# Patient Record
Sex: FEMALE | Race: WHITE | NOT HISPANIC OR LATINO | URBAN - METROPOLITAN AREA
[De-identification: names, ages, dates, MRNs, and addresses within clinical notes are randomized per-mention and may not be internally consistent; named-entity substitution may affect disease eponyms.]

---

## 2018-11-09 ENCOUNTER — EMERGENCY (EMERGENCY)
Facility: HOSPITAL | Age: 83
LOS: 0 days | Discharge: HOME | End: 2018-11-09
Attending: EMERGENCY MEDICINE
Payer: MEDICARE

## 2018-11-09 VITALS
SYSTOLIC BLOOD PRESSURE: 182 MMHG | HEIGHT: 69 IN | WEIGHT: 100.09 LBS | TEMPERATURE: 98 F | DIASTOLIC BLOOD PRESSURE: 111 MMHG | HEART RATE: 100 BPM | RESPIRATION RATE: 18 BRPM | OXYGEN SATURATION: 100 %

## 2018-11-09 VITALS
HEART RATE: 88 BPM | RESPIRATION RATE: 18 BRPM | TEMPERATURE: 98 F | OXYGEN SATURATION: 99 % | SYSTOLIC BLOOD PRESSURE: 132 MMHG | DIASTOLIC BLOOD PRESSURE: 81 MMHG

## 2018-11-09 DIAGNOSIS — Z88.9 ALLERGY STATUS TO UNSPECIFIED DRUGS, MEDICAMENTS AND BIOLOGICAL SUBSTANCES: ICD-10-CM

## 2018-11-09 DIAGNOSIS — E78.5 HYPERLIPIDEMIA, UNSPECIFIED: ICD-10-CM

## 2018-11-09 DIAGNOSIS — E11.9 TYPE 2 DIABETES MELLITUS WITHOUT COMPLICATIONS: ICD-10-CM

## 2018-11-09 DIAGNOSIS — K25.9 GASTRIC ULCER, UNSPECIFIED AS ACUTE OR CHRONIC, WITHOUT HEMORRHAGE OR PERFORATION: ICD-10-CM

## 2018-11-09 DIAGNOSIS — E03.9 HYPOTHYROIDISM, UNSPECIFIED: ICD-10-CM

## 2018-11-09 DIAGNOSIS — I10 ESSENTIAL (PRIMARY) HYPERTENSION: ICD-10-CM

## 2018-11-09 DIAGNOSIS — R68.83 CHILLS (WITHOUT FEVER): ICD-10-CM

## 2018-11-09 DIAGNOSIS — Z90.710 ACQUIRED ABSENCE OF BOTH CERVIX AND UTERUS: ICD-10-CM

## 2018-11-09 DIAGNOSIS — R26.81 UNSTEADINESS ON FEET: ICD-10-CM

## 2018-11-09 DIAGNOSIS — R53.1 WEAKNESS: ICD-10-CM

## 2018-11-09 DIAGNOSIS — R05 COUGH: ICD-10-CM

## 2018-11-09 LAB
ALBUMIN SERPL ELPH-MCNC: 3.5 G/DL — SIGNIFICANT CHANGE UP (ref 3.3–5)
ALP SERPL-CCNC: 92 U/L — SIGNIFICANT CHANGE UP (ref 40–120)
ALT FLD-CCNC: 20 U/L — SIGNIFICANT CHANGE UP (ref 12–78)
ANION GAP SERPL CALC-SCNC: 10 MMOL/L — SIGNIFICANT CHANGE UP (ref 5–17)
APPEARANCE UR: CLEAR — SIGNIFICANT CHANGE UP
APTT BLD: 32.7 SEC — SIGNIFICANT CHANGE UP (ref 27.5–36.3)
AST SERPL-CCNC: 24 U/L — SIGNIFICANT CHANGE UP (ref 15–37)
BACTERIA # UR AUTO: ABNORMAL
BASOPHILS # BLD AUTO: 0.06 K/UL — SIGNIFICANT CHANGE UP (ref 0–0.2)
BASOPHILS NFR BLD AUTO: 0.6 % — SIGNIFICANT CHANGE UP (ref 0–2)
BILIRUB SERPL-MCNC: 0.5 MG/DL — SIGNIFICANT CHANGE UP (ref 0.2–1.2)
BILIRUB UR-MCNC: NEGATIVE — SIGNIFICANT CHANGE UP
BUN SERPL-MCNC: 10 MG/DL — SIGNIFICANT CHANGE UP (ref 7–23)
CALCIUM SERPL-MCNC: 8.6 MG/DL — SIGNIFICANT CHANGE UP (ref 8.5–10.1)
CHLORIDE SERPL-SCNC: 104 MMOL/L — SIGNIFICANT CHANGE UP (ref 96–108)
CK MB BLD-MCNC: 1.6 % — SIGNIFICANT CHANGE UP (ref 0–3.5)
CK MB CFR SERPL CALC: 4.4 NG/ML — HIGH (ref 0.5–3.6)
CK SERPL-CCNC: 267 U/L — HIGH (ref 26–192)
CO2 SERPL-SCNC: 26 MMOL/L — SIGNIFICANT CHANGE UP (ref 22–31)
COLOR SPEC: YELLOW — SIGNIFICANT CHANGE UP
CREAT SERPL-MCNC: 0.94 MG/DL — SIGNIFICANT CHANGE UP (ref 0.5–1.3)
DIFF PNL FLD: ABNORMAL
EOSINOPHIL # BLD AUTO: 0.03 K/UL — SIGNIFICANT CHANGE UP (ref 0–0.5)
EOSINOPHIL NFR BLD AUTO: 0.3 % — SIGNIFICANT CHANGE UP (ref 0–6)
EPI CELLS # UR: SIGNIFICANT CHANGE UP
GLUCOSE SERPL-MCNC: 109 MG/DL — HIGH (ref 70–99)
GLUCOSE UR QL: NEGATIVE MG/DL — SIGNIFICANT CHANGE UP
HCT VFR BLD CALC: 42.2 % — SIGNIFICANT CHANGE UP (ref 34.5–45)
HGB BLD-MCNC: 13.4 G/DL — SIGNIFICANT CHANGE UP (ref 11.5–15.5)
IMM GRANULOCYTES NFR BLD AUTO: 1.1 % — SIGNIFICANT CHANGE UP (ref 0–1.5)
INR BLD: 1.02 RATIO — SIGNIFICANT CHANGE UP (ref 0.88–1.16)
KETONES UR-MCNC: ABNORMAL
LACTATE SERPL-SCNC: 1.2 MMOL/L — SIGNIFICANT CHANGE UP (ref 0.7–2)
LEUKOCYTE ESTERASE UR-ACNC: NEGATIVE — SIGNIFICANT CHANGE UP
LIDOCAIN IGE QN: 87 U/L — SIGNIFICANT CHANGE UP (ref 73–393)
LYMPHOCYTES # BLD AUTO: 0.92 K/UL — LOW (ref 1–3.3)
LYMPHOCYTES # BLD AUTO: 9 % — LOW (ref 13–44)
MCHC RBC-ENTMCNC: 30.5 PG — SIGNIFICANT CHANGE UP (ref 27–34)
MCHC RBC-ENTMCNC: 31.8 GM/DL — LOW (ref 32–36)
MCV RBC AUTO: 95.9 FL — SIGNIFICANT CHANGE UP (ref 80–100)
MONOCYTES # BLD AUTO: 0.55 K/UL — SIGNIFICANT CHANGE UP (ref 0–0.9)
MONOCYTES NFR BLD AUTO: 5.4 % — SIGNIFICANT CHANGE UP (ref 2–14)
NEUTROPHILS # BLD AUTO: 8.52 K/UL — HIGH (ref 1.8–7.4)
NEUTROPHILS NFR BLD AUTO: 83.6 % — HIGH (ref 43–77)
NITRITE UR-MCNC: NEGATIVE — SIGNIFICANT CHANGE UP
NRBC # BLD: 0 /100 WBCS — SIGNIFICANT CHANGE UP (ref 0–0)
PH UR: 7 — SIGNIFICANT CHANGE UP (ref 5–8)
PLATELET # BLD AUTO: 201 K/UL — SIGNIFICANT CHANGE UP (ref 150–400)
POTASSIUM SERPL-MCNC: 3.4 MMOL/L — LOW (ref 3.5–5.3)
POTASSIUM SERPL-SCNC: 3.4 MMOL/L — LOW (ref 3.5–5.3)
PROT SERPL-MCNC: 6.8 GM/DL — SIGNIFICANT CHANGE UP (ref 6–8.3)
PROT UR-MCNC: 15 MG/DL
PROTHROM AB SERPL-ACNC: 11.4 SEC — SIGNIFICANT CHANGE UP (ref 10–12.9)
RBC # BLD: 4.4 M/UL — SIGNIFICANT CHANGE UP (ref 3.8–5.2)
RBC # FLD: 14.2 % — SIGNIFICANT CHANGE UP (ref 10.3–14.5)
RBC CASTS # UR COMP ASSIST: ABNORMAL /HPF (ref 0–4)
SODIUM SERPL-SCNC: 140 MMOL/L — SIGNIFICANT CHANGE UP (ref 135–145)
SP GR SPEC: 1 — LOW (ref 1.01–1.02)
TROPONIN I SERPL-MCNC: <.015 NG/ML — SIGNIFICANT CHANGE UP (ref 0.01–0.04)
UROBILINOGEN FLD QL: NEGATIVE MG/DL — SIGNIFICANT CHANGE UP
WBC # BLD: 10.19 K/UL — SIGNIFICANT CHANGE UP (ref 3.8–10.5)
WBC # FLD AUTO: 10.19 K/UL — SIGNIFICANT CHANGE UP (ref 3.8–10.5)

## 2018-11-09 PROCEDURE — 99053 MED SERV 10PM-8AM 24 HR FAC: CPT

## 2018-11-09 PROCEDURE — 93010 ELECTROCARDIOGRAM REPORT: CPT

## 2018-11-09 PROCEDURE — 70450 CT HEAD/BRAIN W/O DYE: CPT | Mod: 26

## 2018-11-09 PROCEDURE — 71045 X-RAY EXAM CHEST 1 VIEW: CPT | Mod: 26

## 2018-11-09 PROCEDURE — 73110 X-RAY EXAM OF WRIST: CPT | Mod: 26,LT

## 2018-11-09 PROCEDURE — 72170 X-RAY EXAM OF PELVIS: CPT | Mod: 26

## 2018-11-09 PROCEDURE — 99284 EMERGENCY DEPT VISIT MOD MDM: CPT | Mod: 25

## 2018-11-09 PROCEDURE — ZZZZZ: CPT

## 2018-11-09 PROCEDURE — 72125 CT NECK SPINE W/O DYE: CPT | Mod: 26

## 2018-11-09 RX ORDER — SODIUM CHLORIDE 9 MG/ML
500 INJECTION INTRAMUSCULAR; INTRAVENOUS; SUBCUTANEOUS ONCE
Qty: 0 | Refills: 0 | Status: COMPLETED | OUTPATIENT
Start: 2018-11-09 | End: 2018-11-09

## 2018-11-09 RX ORDER — TETANUS TOXOID, REDUCED DIPHTHERIA TOXOID AND ACELLULAR PERTUSSIS VACCINE, ADSORBED 5; 2.5; 8; 8; 2.5 [IU]/.5ML; [IU]/.5ML; UG/.5ML; UG/.5ML; UG/.5ML
0.5 SUSPENSION INTRAMUSCULAR ONCE
Qty: 0 | Refills: 0 | Status: COMPLETED | OUTPATIENT
Start: 2018-11-09 | End: 2018-11-09

## 2018-11-09 RX ORDER — AMLODIPINE BESYLATE 2.5 MG/1
5 TABLET ORAL ONCE
Qty: 0 | Refills: 0 | Status: DISCONTINUED | OUTPATIENT
Start: 2018-11-09 | End: 2018-11-09

## 2018-11-09 RX ORDER — SODIUM CHLORIDE 9 MG/ML
3 INJECTION INTRAMUSCULAR; INTRAVENOUS; SUBCUTANEOUS ONCE
Qty: 0 | Refills: 0 | Status: COMPLETED | OUTPATIENT
Start: 2018-11-09 | End: 2018-11-09

## 2018-11-09 RX ORDER — LISINOPRIL 2.5 MG/1
10 TABLET ORAL ONCE
Qty: 0 | Refills: 0 | Status: DISCONTINUED | OUTPATIENT
Start: 2018-11-09 | End: 2018-11-09

## 2018-11-09 RX ADMIN — Medication 0.1 MILLIGRAM(S): at 11:29

## 2018-11-09 RX ADMIN — SODIUM CHLORIDE 500 MILLILITER(S): 9 INJECTION INTRAMUSCULAR; INTRAVENOUS; SUBCUTANEOUS at 09:40

## 2018-11-09 RX ADMIN — SODIUM CHLORIDE 500 MILLILITER(S): 9 INJECTION INTRAMUSCULAR; INTRAVENOUS; SUBCUTANEOUS at 08:29

## 2018-11-09 RX ADMIN — TETANUS TOXOID, REDUCED DIPHTHERIA TOXOID AND ACELLULAR PERTUSSIS VACCINE, ADSORBED 0.5 MILLILITER(S): 5; 2.5; 8; 8; 2.5 SUSPENSION INTRAMUSCULAR at 08:30

## 2018-11-09 RX ADMIN — SODIUM CHLORIDE 3 MILLILITER(S): 9 INJECTION INTRAMUSCULAR; INTRAVENOUS; SUBCUTANEOUS at 07:30

## 2018-11-09 NOTE — PHYSICAL THERAPY INITIAL EVALUATION ADULT - GENERAL OBSERVATIONS, REHAB EVAL
Pt seen supine in stretcher, NAD, alert, Aide at bedside. Pt performed bed mobility and sit to stand independently. Pt required use of rolling walker versus straight cane secondary to balance deficits. Pt ambulates 50ft c supervision and rolling walker, negotiated steps c supervision and straight cane. Pt resumed supine position in stretcher, left in NAD, call bell in reach.

## 2018-11-09 NOTE — ED ADULT NURSE NOTE - OBJECTIVE STATEMENT
patient's private aide states she lost her balance @ appr 5am when going to the bathroom. fell. denies head trauma. denies pain.

## 2018-11-09 NOTE — ED PROVIDER NOTE - MEDICAL DECISION MAKING DETAILS
initial plan of care: labs, iv, ct, xr, urinalysis initial plan of care: labs, iv, ct, xr, urinalysis; plan is for home hospice; patient seen and evaluated by PT and Juanita from social work

## 2018-11-09 NOTE — ED ADULT NURSE NOTE - INTERVENTIONS DEFINITIONS
Call bell, personal items and telephone within reach/Room bathroom lighting operational/Non-slip footwear when patient is off stretcher/Stretcher in lowest position, wheels locked, appropriate side rails in place/Instruct patient to call for assistance/[place] to call system/Physically safe environment: no spills, clutter or unnecessary equipment/Provide visual cue, wrist band, yellow gown, etc./Provide visual clues: red socks/Monitor gait and stability/Monitor for mental status changes and reorient to person, place, and time/Review medications for side effects contributing to fall risk

## 2018-11-09 NOTE — ED PROVIDER NOTE - OBJECTIVE STATEMENT
89yoF; with pmh signif for DM, HTN, HLD, Hypothyroidism; now p/w generalized weakness. states for past 2 days it has been increasingly difficult to get out of bed. she has been trying to roll out of bed and fell onto ground twice.  states she is too weak to sit up on her own.  c/o chills. c/o cough. denies fever. denies sick contacts. denies cp/sob/palp. denies abd pain. c/o left wrist pain. denies dysuria, hematuria, frequency, urgency  PMH: DM, HTN, HLD, Hypothyroidism  SOCIAL: No tobacco/illicit substance use/EtOH

## 2018-11-09 NOTE — ED PROVIDER NOTE - PHYSICAL EXAMINATION
General:     NAD  Head:     NC/AT, EOMI, oral mucosa moist  Neck:     trachea midline  Lungs:     CTA b/l, no w/r/r  CVS:     S1S2, RRR, no m/g/r  Abd:     +BS, s/nt/nd, no organomegaly  SPINE: nt midline c/t/l/s spine  Ext:    2+ radial and pedal pulses, no c/c/e. from/nt @ bilateral hip, knee, ankle. from/nt @ right shoulder, elbow, wris.t from/nt @ left shoulder, elbow. ttp @ left wrist, from.   Skin: abrasion and ecchymoses to bilateral arms.   Neuro: AAOx3, no sensory/motor deficits

## 2018-11-09 NOTE — PHYSICAL THERAPY INITIAL EVALUATION ADULT - ADDITIONAL COMMENTS
Pt has ~16 steps c bilateral railings (too far apart to hold onto both). Pt has [facility] 5 days a week for 4 hours a day to assist with ADL's.

## 2018-11-09 NOTE — PHYSICAL THERAPY INITIAL EVALUATION ADULT - GAIT DEVIATIONS NOTED, PT EVAL
decreased stride length/increased time in double stance/decreased step length/decreased marisa/decreased velocity of limb motion

## 2018-11-09 NOTE — ED PROVIDER NOTE - PMH
DM (diabetes mellitus) type II controlled peripheral vascular disorder    Gastric ulcer    H/O abdominal hysterectomy    History of cholecystectomy    Hyperlipidemia    Hypertension    Hypothyroid

## 2018-11-09 NOTE — PHYSICAL THERAPY INITIAL EVALUATION ADULT - CRITERIA FOR SKILLED THERAPEUTIC INTERVENTIONS
anticipated equipment needs at discharge/impairments found/functional limitations in following categories/risk reduction/prevention/therapy frequency/home with home PT and rolling walker./anticipated discharge recommendation/predicted duration of therapy intervention

## 2018-11-09 NOTE — ED PROVIDER NOTE - NS ED ROS FT
Constitutional: (-) fever  (+)chills  (-)sweats  Eyes/ENT: (-) blurry vision, (-) epistaxis  (-)rhinorrhea   (-) sore throat    Cardiovascular: (-) chest pain, (-) palpitations (-) edema   Respiratory: (+) cough, (-) shortness of breath   Gastrointestinal: (-)nausea  (-)vomiting, (-) diarrhea  (-) abdominal pain   :  (-)dysuria, (-)frequency, (-)urgency, (-)hematuria  Musculoskeletal: (-) neck pain, (-) back pain, (-) joint pain  Integumentary: (-) rash, (-) edema  Neurological: (-) headache, (-) altered mental status  (-)LOC

## 2018-11-09 NOTE — ED ADULT TRIAGE NOTE - WEIGHT METHOD
Nurse's notes (such as: Visit Notes) reviewed and accepted.  Social History     Social History   • Marital status:      Spouse name: N/A   • Number of children: 1   • Years of education: N/A     Occupational History   • retired      police department     Social History Main Topics   • Smoking status: Former Smoker     Packs/day: 1.00     Years: 20.00     Quit date: 1/1/1986   • Smokeless tobacco: Never Used   • Alcohol use No      Comment: none   • Drug use: No   • Sexual activity: Yes     Partners: Female     Other Topics Concern   • Blood Transfusions No   • Caffeine Concern No   • Sleep Concern No   • Stress Concern No   • Weight Concern Yes   • Special Diet No   • Exercise Yes     walk 2-3 miles daily   • Seat Belt Yes     Social History Narrative    .. Retired. Advised in hospital to complete living will and POAHC.  for [employer]     Family History   Problem Relation Age of Onset   • Stroke Mother    • Hypertension Mother    • Heart disease Father    • Cancer Maternal Grandfather      pancreatic     ALLERGIES:  No Known Allergies  Current Outpatient Prescriptions   Medication Sig Dispense Refill   • lisinopril-hydroCHLOROthiazide (PRINZIDE,ZESTORETIC) 20-25 MG per tablet Take 1 tablet by mouth daily. Needs appt for more refills 90 tablet 0   • atenolol (TENORMIN) 25 MG tablet Take 1 tablet by mouth daily. 90 tablet 0   • atorvastatin (LIPITOR) 40 MG tablet Take 1 tablet by mouth daily. 90 tablet 0   • sildenafil (VIAGRA) 50 MG tablet Take 1 tablet by mouth as needed for Erectile Dysfunction. 5 tablet 6   • ketoconazole (NIZORAL) 2 % shampoo APPLY TO THE AFFECTED AREA TWO TIMES PER WEEK THEN RINSE OFF IN 5 MINUTES 120 mL 11   • cephALEXin (KEFLEX) 500 MG capsule Take 4 capsules one hour prior to dental procedures 16 capsule 2   • FREESTYLE LITE strip Test 1 time per day before or 2 hours after a meal 50 strip 11   • Blood Glucose Monitoring Suppl (FREESTYLE FREEDOM LITE) W/DEVICE  KIT Test 1 time per day before or 2 hours after a meal 1 kit 0     No current facility-administered medications for this visit.      Past Medical History:   Diagnosis Date   • BCC (basal cell carcinoma), face 5/2016    Dr Hahn Right cheek   • Benign neoplasm of colon 8/14/15    hyperplastic x 1   • Chronic kidney disease (CKD)     creat 1.6 BUN 29 7/06;  1.5 and 22 6/05 nl 6/14   • Hyperpotassemia 9/06    stopped lisinopril 40 mg   • Labyrinthitis, unspecified     remotely ? vestibular migrinae   • Olecranon bursitis 2014    septic   • Osteoarthritis knee    • Other abnormal blood chemistry     IGT glucose 116 at ce 6/06   • Other and unspecified hyperlipidemia     combined    • Other and unspecified malignant neoplasm of skin of other and unspecified parts of face     nose   • Personal history of colonic polyps 2008 2011 follow up [place]   • Pneumonia, organism unspecified(486) 9/06    smo   • Squamous cell carcinoma    • Status post total left knee replacement using cement 2-11-16    Dr Moss   • Unspecified asthma, with exacerbation 9/06    new onset with pneumonia 9/06   • Unspecified essential hypertension    • Unspecified malignant neoplasm of skin, site unspecified     bcc        stated

## 2018-11-10 LAB
CULTURE RESULTS: NO GROWTH — SIGNIFICANT CHANGE UP
SPECIMEN SOURCE: SIGNIFICANT CHANGE UP

## 2018-11-11 ENCOUNTER — INPATIENT (INPATIENT)
Facility: HOSPITAL | Age: 83
LOS: 2 days | Discharge: HOME | End: 2018-11-14
Attending: INTERNAL MEDICINE | Admitting: INTERNAL MEDICINE
Payer: MEDICARE

## 2018-11-11 VITALS
HEIGHT: 61 IN | HEART RATE: 85 BPM | DIASTOLIC BLOOD PRESSURE: 80 MMHG | SYSTOLIC BLOOD PRESSURE: 159 MMHG | OXYGEN SATURATION: 98 % | RESPIRATION RATE: 19 BRPM | WEIGHT: 115.96 LBS

## 2018-11-11 DIAGNOSIS — Z98.890 OTHER SPECIFIED POSTPROCEDURAL STATES: Chronic | ICD-10-CM

## 2018-11-11 DIAGNOSIS — E11.9 TYPE 2 DIABETES MELLITUS WITHOUT COMPLICATIONS: ICD-10-CM

## 2018-11-11 DIAGNOSIS — R07.89 OTHER CHEST PAIN: ICD-10-CM

## 2018-11-11 DIAGNOSIS — R26.81 UNSTEADINESS ON FEET: ICD-10-CM

## 2018-11-11 DIAGNOSIS — I48.0 PAROXYSMAL ATRIAL FIBRILLATION: ICD-10-CM

## 2018-11-11 DIAGNOSIS — E03.9 HYPOTHYROIDISM, UNSPECIFIED: ICD-10-CM

## 2018-11-11 DIAGNOSIS — I10 ESSENTIAL (PRIMARY) HYPERTENSION: ICD-10-CM

## 2018-11-11 LAB
ALBUMIN SERPL ELPH-MCNC: 3.1 G/DL — LOW (ref 3.3–5)
ALP SERPL-CCNC: 84 U/L — SIGNIFICANT CHANGE UP (ref 40–120)
ALT FLD-CCNC: 18 U/L — SIGNIFICANT CHANGE UP (ref 12–78)
ANION GAP SERPL CALC-SCNC: 11 MMOL/L — SIGNIFICANT CHANGE UP (ref 5–17)
APPEARANCE UR: CLEAR — SIGNIFICANT CHANGE UP
AST SERPL-CCNC: 12 U/L — LOW (ref 15–37)
BACTERIA # UR AUTO: ABNORMAL
BASOPHILS # BLD AUTO: 0.04 K/UL — SIGNIFICANT CHANGE UP (ref 0–0.2)
BASOPHILS NFR BLD AUTO: 0.4 % — SIGNIFICANT CHANGE UP (ref 0–2)
BILIRUB SERPL-MCNC: 0.5 MG/DL — SIGNIFICANT CHANGE UP (ref 0.2–1.2)
BILIRUB UR-MCNC: NEGATIVE — SIGNIFICANT CHANGE UP
BUN SERPL-MCNC: 16 MG/DL — SIGNIFICANT CHANGE UP (ref 7–23)
CALCIUM SERPL-MCNC: 8.2 MG/DL — LOW (ref 8.5–10.1)
CHLORIDE SERPL-SCNC: 100 MMOL/L — SIGNIFICANT CHANGE UP (ref 96–108)
CK MB CFR SERPL CALC: 2.3 NG/ML — SIGNIFICANT CHANGE UP (ref 0.5–3.6)
CK SERPL-CCNC: 89 U/L — SIGNIFICANT CHANGE UP (ref 26–192)
CK SERPL-CCNC: 98 U/L — SIGNIFICANT CHANGE UP (ref 26–192)
CO2 SERPL-SCNC: 25 MMOL/L — SIGNIFICANT CHANGE UP (ref 22–31)
COLOR SPEC: YELLOW — SIGNIFICANT CHANGE UP
COMMENT - URINE: SIGNIFICANT CHANGE UP
CREAT SERPL-MCNC: 1.22 MG/DL — SIGNIFICANT CHANGE UP (ref 0.5–1.3)
D DIMER BLD IA.RAPID-MCNC: 336 NG/ML DDU — HIGH
DIFF PNL FLD: ABNORMAL
EOSINOPHIL # BLD AUTO: 0.02 K/UL — SIGNIFICANT CHANGE UP (ref 0–0.5)
EOSINOPHIL NFR BLD AUTO: 0.2 % — SIGNIFICANT CHANGE UP (ref 0–6)
EPI CELLS # UR: SIGNIFICANT CHANGE UP
GLUCOSE BLDC GLUCOMTR-MCNC: 156 MG/DL — HIGH (ref 70–99)
GLUCOSE BLDC GLUCOMTR-MCNC: 250 MG/DL — HIGH (ref 70–99)
GLUCOSE SERPL-MCNC: 349 MG/DL — HIGH (ref 70–99)
GLUCOSE UR QL: 1000 MG/DL
HCT VFR BLD CALC: 37 % — SIGNIFICANT CHANGE UP (ref 34.5–45)
HGB BLD-MCNC: 12.1 G/DL — SIGNIFICANT CHANGE UP (ref 11.5–15.5)
HYALINE CASTS # UR AUTO: ABNORMAL /LPF
IMM GRANULOCYTES NFR BLD AUTO: 0.4 % — SIGNIFICANT CHANGE UP (ref 0–1.5)
INR BLD: 1.11 RATIO — SIGNIFICANT CHANGE UP (ref 0.88–1.16)
KETONES UR-MCNC: NEGATIVE — SIGNIFICANT CHANGE UP
LEUKOCYTE ESTERASE UR-ACNC: ABNORMAL
LYMPHOCYTES # BLD AUTO: 1.14 K/UL — SIGNIFICANT CHANGE UP (ref 1–3.3)
LYMPHOCYTES # BLD AUTO: 10.2 % — LOW (ref 13–44)
MAGNESIUM SERPL-MCNC: 2.4 MG/DL — SIGNIFICANT CHANGE UP (ref 1.6–2.6)
MCHC RBC-ENTMCNC: 30.9 PG — SIGNIFICANT CHANGE UP (ref 27–34)
MCHC RBC-ENTMCNC: 32.7 GM/DL — SIGNIFICANT CHANGE UP (ref 32–36)
MCV RBC AUTO: 94.6 FL — SIGNIFICANT CHANGE UP (ref 80–100)
MONOCYTES # BLD AUTO: 0.81 K/UL — SIGNIFICANT CHANGE UP (ref 0–0.9)
MONOCYTES NFR BLD AUTO: 7.3 % — SIGNIFICANT CHANGE UP (ref 2–14)
NEUTROPHILS # BLD AUTO: 9.11 K/UL — HIGH (ref 1.8–7.4)
NEUTROPHILS NFR BLD AUTO: 81.5 % — HIGH (ref 43–77)
NITRITE UR-MCNC: NEGATIVE — SIGNIFICANT CHANGE UP
NRBC # BLD: 0 /100 WBCS — SIGNIFICANT CHANGE UP (ref 0–0)
PH UR: 5 — SIGNIFICANT CHANGE UP (ref 5–8)
PLATELET # BLD AUTO: 189 K/UL — SIGNIFICANT CHANGE UP (ref 150–400)
POTASSIUM SERPL-MCNC: 3.6 MMOL/L — SIGNIFICANT CHANGE UP (ref 3.5–5.3)
POTASSIUM SERPL-SCNC: 3.6 MMOL/L — SIGNIFICANT CHANGE UP (ref 3.5–5.3)
PROT SERPL-MCNC: 6.2 GM/DL — SIGNIFICANT CHANGE UP (ref 6–8.3)
PROT UR-MCNC: 30 MG/DL
PROTHROM AB SERPL-ACNC: 12.5 SEC — SIGNIFICANT CHANGE UP (ref 10–12.9)
RBC # BLD: 3.91 M/UL — SIGNIFICANT CHANGE UP (ref 3.8–5.2)
RBC # FLD: 14 % — SIGNIFICANT CHANGE UP (ref 10.3–14.5)
RBC CASTS # UR COMP ASSIST: ABNORMAL /HPF (ref 0–4)
SODIUM SERPL-SCNC: 136 MMOL/L — SIGNIFICANT CHANGE UP (ref 135–145)
SP GR SPEC: 1.01 — SIGNIFICANT CHANGE UP (ref 1.01–1.02)
TROPONIN I SERPL-MCNC: <.015 NG/ML — SIGNIFICANT CHANGE UP (ref 0.01–0.04)
TSH SERPL-MCNC: 2.31 UU/ML — SIGNIFICANT CHANGE UP (ref 0.36–3.74)
UROBILINOGEN FLD QL: 1 MG/DL
WBC # BLD: 11.16 K/UL — HIGH (ref 3.8–10.5)
WBC # FLD AUTO: 11.16 K/UL — HIGH (ref 3.8–10.5)
WBC UR QL: SIGNIFICANT CHANGE UP

## 2018-11-11 PROCEDURE — 71275 CT ANGIOGRAPHY CHEST: CPT | Mod: 26

## 2018-11-11 PROCEDURE — 99285 EMERGENCY DEPT VISIT HI MDM: CPT

## 2018-11-11 PROCEDURE — 93010 ELECTROCARDIOGRAM REPORT: CPT

## 2018-11-11 PROCEDURE — 71045 X-RAY EXAM CHEST 1 VIEW: CPT | Mod: 26

## 2018-11-11 PROCEDURE — 70450 CT HEAD/BRAIN W/O DYE: CPT | Mod: 26

## 2018-11-11 RX ORDER — LEVOTHYROXINE SODIUM 125 MCG
50 TABLET ORAL DAILY
Qty: 0 | Refills: 0 | Status: DISCONTINUED | OUTPATIENT
Start: 2018-11-11 | End: 2018-11-14

## 2018-11-11 RX ORDER — SODIUM CHLORIDE 9 MG/ML
1000 INJECTION INTRAMUSCULAR; INTRAVENOUS; SUBCUTANEOUS ONCE
Qty: 0 | Refills: 0 | Status: COMPLETED | OUTPATIENT
Start: 2018-11-11 | End: 2018-11-11

## 2018-11-11 RX ORDER — DEXTROSE 50 % IN WATER 50 %
15 SYRINGE (ML) INTRAVENOUS ONCE
Qty: 0 | Refills: 0 | Status: DISCONTINUED | OUTPATIENT
Start: 2018-11-11 | End: 2018-11-14

## 2018-11-11 RX ORDER — DEXTROSE 50 % IN WATER 50 %
25 SYRINGE (ML) INTRAVENOUS ONCE
Qty: 0 | Refills: 0 | Status: DISCONTINUED | OUTPATIENT
Start: 2018-11-11 | End: 2018-11-14

## 2018-11-11 RX ORDER — SIMVASTATIN 20 MG/1
10 TABLET, FILM COATED ORAL AT BEDTIME
Qty: 0 | Refills: 0 | Status: DISCONTINUED | OUTPATIENT
Start: 2018-11-11 | End: 2018-11-14

## 2018-11-11 RX ORDER — AMLODIPINE BESYLATE 2.5 MG/1
5 TABLET ORAL DAILY
Qty: 0 | Refills: 0 | Status: DISCONTINUED | OUTPATIENT
Start: 2018-11-11 | End: 2018-11-14

## 2018-11-11 RX ORDER — SODIUM CHLORIDE 9 MG/ML
1000 INJECTION, SOLUTION INTRAVENOUS
Qty: 0 | Refills: 0 | Status: DISCONTINUED | OUTPATIENT
Start: 2018-11-11 | End: 2018-11-14

## 2018-11-11 RX ORDER — LISINOPRIL 2.5 MG/1
10 TABLET ORAL DAILY
Qty: 0 | Refills: 0 | Status: DISCONTINUED | OUTPATIENT
Start: 2018-11-11 | End: 2018-11-14

## 2018-11-11 RX ORDER — PANTOPRAZOLE SODIUM 20 MG/1
40 TABLET, DELAYED RELEASE ORAL
Qty: 0 | Refills: 0 | Status: DISCONTINUED | OUTPATIENT
Start: 2018-11-11 | End: 2018-11-14

## 2018-11-11 RX ORDER — DEXTROSE 50 % IN WATER 50 %
12.5 SYRINGE (ML) INTRAVENOUS ONCE
Qty: 0 | Refills: 0 | Status: DISCONTINUED | OUTPATIENT
Start: 2018-11-11 | End: 2018-11-14

## 2018-11-11 RX ORDER — ENOXAPARIN SODIUM 100 MG/ML
50 INJECTION SUBCUTANEOUS ONCE
Qty: 0 | Refills: 0 | Status: COMPLETED | OUTPATIENT
Start: 2018-11-11 | End: 2018-11-11

## 2018-11-11 RX ORDER — METOPROLOL TARTRATE 50 MG
25 TABLET ORAL
Qty: 0 | Refills: 0 | Status: DISCONTINUED | OUTPATIENT
Start: 2018-11-11 | End: 2018-11-14

## 2018-11-11 RX ORDER — GLUCAGON INJECTION, SOLUTION 0.5 MG/.1ML
1 INJECTION, SOLUTION SUBCUTANEOUS ONCE
Qty: 0 | Refills: 0 | Status: DISCONTINUED | OUTPATIENT
Start: 2018-11-11 | End: 2018-11-14

## 2018-11-11 RX ORDER — ENOXAPARIN SODIUM 100 MG/ML
50 INJECTION SUBCUTANEOUS DAILY
Qty: 0 | Refills: 0 | Status: DISCONTINUED | OUTPATIENT
Start: 2018-11-12 | End: 2018-11-13

## 2018-11-11 RX ORDER — MORPHINE SULFATE 50 MG/1
2 CAPSULE, EXTENDED RELEASE ORAL ONCE
Qty: 0 | Refills: 0 | Status: DISCONTINUED | OUTPATIENT
Start: 2018-11-11 | End: 2018-11-11

## 2018-11-11 RX ORDER — ACETAMINOPHEN 500 MG
650 TABLET ORAL EVERY 6 HOURS
Qty: 0 | Refills: 0 | Status: DISCONTINUED | OUTPATIENT
Start: 2018-11-11 | End: 2018-11-14

## 2018-11-11 RX ORDER — INSULIN LISPRO 100/ML
VIAL (ML) SUBCUTANEOUS AT BEDTIME
Qty: 0 | Refills: 0 | Status: DISCONTINUED | OUTPATIENT
Start: 2018-11-11 | End: 2018-11-14

## 2018-11-11 RX ORDER — ACETAMINOPHEN 500 MG
650 TABLET ORAL ONCE
Qty: 0 | Refills: 0 | Status: COMPLETED | OUTPATIENT
Start: 2018-11-11 | End: 2018-11-11

## 2018-11-11 RX ADMIN — MORPHINE SULFATE 2 MILLIGRAM(S): 50 CAPSULE, EXTENDED RELEASE ORAL at 10:56

## 2018-11-11 RX ADMIN — ENOXAPARIN SODIUM 50 MILLIGRAM(S): 100 INJECTION SUBCUTANEOUS at 12:40

## 2018-11-11 RX ADMIN — Medication 25 MILLIGRAM(S): at 16:06

## 2018-11-11 RX ADMIN — SODIUM CHLORIDE 1000 MILLILITER(S): 9 INJECTION INTRAMUSCULAR; INTRAVENOUS; SUBCUTANEOUS at 10:22

## 2018-11-11 RX ADMIN — SIMVASTATIN 10 MILLIGRAM(S): 20 TABLET, FILM COATED ORAL at 21:29

## 2018-11-11 RX ADMIN — Medication 650 MILLIGRAM(S): at 10:10

## 2018-11-11 RX ADMIN — Medication 650 MILLIGRAM(S): at 16:24

## 2018-11-11 RX ADMIN — Medication 650 MILLIGRAM(S): at 10:41

## 2018-11-11 RX ADMIN — Medication 650 MILLIGRAM(S): at 16:06

## 2018-11-11 RX ADMIN — MORPHINE SULFATE 2 MILLIGRAM(S): 50 CAPSULE, EXTENDED RELEASE ORAL at 10:27

## 2018-11-11 NOTE — ED ADULT NURSE NOTE - NSIMPLEMENTINTERV_GEN_ALL_ED
Implemented All Fall with Harm Risk Interventions:  [place] to call system. Call bell, personal items and telephone within reach. Instruct patient to call for assistance. Room bathroom lighting operational. Non-slip footwear when patient is off stretcher. Physically safe environment: no spills, clutter or unnecessary equipment. Stretcher in lowest position, wheels locked, appropriate side rails in place. Provide visual cue, wrist band, yellow gown, etc. Monitor gait and stability. Monitor for mental status changes and reorient to person, place, and time. Review medications for side effects contributing to fall risk. Reinforce activity limits and safety measures with patient and family. Provide visual clues: red socks.

## 2018-11-11 NOTE — CONSULT NOTE ADULT - SUBJECTIVE AND OBJECTIVE BOX
HPI:  HPI:  PT  REPORTED  WITH  FALL 2  DAYS  AGO  EVALUATED  AND  DISCHARGED FROM  ER  PT  REPORTED WITH  CP  RE EVALUATED  IN  ER  FOUND  TO  BE  WITH AFIB   ADMITTED  FOR  FURTHER  W/U  AND  TREATMENT (2018 12:54)      Chief Complaint:  Patient is a 89y old  Female who presents with a chief complaint of chest pain  NEW  ONSET  AFIB (2018 08:12)      Review of Systems:    General:  No wt loss, fevers, chills, night sweats  Eyes:  Good vision, no reported pain  ENT:  No sore throat, pain, runny nose, dysphagia  CV:  chest pain, no palpitations, hypo/hypertension  Resp:  No dyspnea, cough, tachypnea, wheezing  GI:  No pain, nausea, vomiting, diarrhea, constipation  :  No pain, bleeding, incontinence, nocturia           Social History/Family History  SOCHX:   tobacco,  -  alcohol    FMHX: FA/MO  - contributory       Discussed with:  PMD, Family    Physical Exam:    Vital Signs:  Vital Signs Last 24 Hrs  T(C): 36.7 (2018 11:57), Max: 36.7 (2018 11:57)  T(F): 98 (2018 11:57), Max: 98 (2018 11:57)  HR: 88 (2018 11:57) (82 - 89)  BP: 113/66 (2018 11:57) (113/66 - 173/96)  BP(mean): --  RR: 16 (2018 11:57) (16 - 18)  SpO2: 99% (2018 11:57) (97% - 100%)  Daily Height in cm: 154.94 (2018 16:58)    Daily Weight in k.9 (2018 05:16)  I&O's Summary    2018 07:01  -  2018 15:40  --------------------------------------------------------  IN: 180 mL / OUT: 0 mL / NET: 180 mL          HEENT:  NC/AT, patent nares w/ pink mucosa, OP clear w/o lesions, PERRL, EOMI, conjunctivae clear, no thyromegaly, nodules, adenopathy, no JVD  Chest:  Full & symmetric excursion, no increased effort, breath sounds clear  Cardiovascular:  IRRegular rhythm, S1, S2, no murmur/rub/S3/S4, no carotid/femoral/abdominal bruit, radial/pedal pulses 2+, no edema  Abdomen:  Soft, non-tender, non-distended, normoactive bowel sounds, no HSM    Laboratory:                          11.7   7.55  )-----------( 187      ( 2018 07:03 )             35.5         139  |  104  |  9   ----------------------------<  98  3.5   |  28  |  0.82    Ca    8.0<L>      2018 07:03  Mg     2.4         TPro  5.7<L>  /  Alb  2.8<L>  /  TBili  0.6  /  DBili  x   /  AST  13<L>  /  ALT  16  /  AlkPhos  73        CARDIAC MARKERS ( 2018 07:03 )  <.015 ng/mL / x     / 77 U/L / x     / x      CARDIAC MARKERS ( 2018 21:27 )  <.015 ng/mL / x     / 89 U/L / x     / x      CARDIAC MARKERS ( 2018 14:22 )  <.015 ng/mL / x     / 98 U/L / x     / x      CARDIAC MARKERS ( 2018 10:21 )  <.015 ng/mL / x     / x     / x     / 2.3 ng/mL      CAPILLARY BLOOD GLUCOSE      POCT Blood Glucose.: 183 mg/dL (2018 11:35)  POCT Blood Glucose.: 107 mg/dL (2018 07:31)  POCT Blood Glucose.: 156 mg/dL (2018 21:28)    LIVER FUNCTIONS - ( 2018 07:03 )  Alb: 2.8 g/dL / Pro: 5.7 gm/dL / ALK PHOS: 73 U/L / ALT: 16 U/L / AST: 13 U/L / GGT: x           PT/INR - ( 2018 10:21 )   PT: 12.5 sec;   INR: 1.11 ratio           Urinalysis Basic - ( 2018 12:37 )    Color: Yellow / Appearance: Clear / S.015 / pH: x  Gluc: x / Ketone: Negative  / Bili: Negative / Urobili: 1 mg/dL   Blood: x / Protein: 30 mg/dL / Nitrite: Negative   Leuk Esterase: Trace / RBC: 3-5 /HPF / WBC 0-2   Sq Epi: x / Non Sq Epi: Few / Bacteria: Few        Assessment:    AFIB  Cause of CP likely RVR  Metoprolol effective  HTN, SBP managed  F/U TTE recent or new
No

## 2018-11-11 NOTE — ED ADULT TRIAGE NOTE - CHIEF COMPLAINT QUOTE
patient c/o of chest pain no radiation started today , c/o of headache and difficulty breathing ,denied N/V , patient had a fall 2 days ago which she was treated here for it

## 2018-11-11 NOTE — H&P ADULT - PROBLEM SELECTOR PLAN 1
monitor  on  telemetry  rate  control  anticoagualtion TTE  seraal  troponins  Cardiology eval monitor  on  telemetry  rate  control  anticoagualtion TTE  serial  troponins  Cardiology eval

## 2018-11-11 NOTE — H&P ADULT - NSHPLABSRESULTS_GEN_ALL_CORE
LABS:                        12.1   11.16 )-----------( 189      ( 11 Nov 2018 10:21 )             37.0     11-11    136  |  100  |  16  ----------------------------<  349<H>  3.6   |  25  |  1.22    Ca    8.2<L>      11 Nov 2018 10:21  Mg     2.4     11-11    TPro  6.2  /  Alb  3.1<L>  /  TBili  0.5  /  DBili  x   /  AST  12<L>  /  ALT  18  /  AlkPhos  84  11-11    PT/INR - ( 11 Nov 2018 10:21 )   PT: 12.5 sec;   INR: 1.11 ratio

## 2018-11-11 NOTE — ED ADULT NURSE NOTE - OBJECTIVE STATEMENT
per daughter at bedside pt was here 2 days ago after she fell off the bed  today woke up c/o chest pain

## 2018-11-11 NOTE — H&P ADULT - HISTORY OF PRESENT ILLNESS
PT  REPORTED  WITH  FALL 2  DAYS  AGO  EVALUATED  AND  DISCHARGED FROM  ER  PT  REPORTED WITH  CP  RE EVALUATED  IN  ER  FOUND  TO  BE  WITH AFIB   ADMITTED  FOR  FURTHER  W/U  AND  TREATMENT

## 2018-11-11 NOTE — H&P ADULT - NSHPPHYSICALEXAM_GEN_ALL_CORE
PHYSICAL EXAM:    GENERAL: NAD, well-groomed, well-developed  HEAD:  Atraumatic, Normocephalic  EYES: EOMI, PERRLA, conjunctiva and sclera clear  ENMT: No tonsillar erythema, exudates, or enlargement; Moist mucous membranes, , No lesions  NECK: Supple, No JVD, Normal thyroid  NERVOUS SYSTEM:  Alert & Oriented X2, ; Motor Strength 5/5 B/L upper and lower extremities; DTRs 2+ intact and symmetric  CHEST/LUNG: Clear  bilaterally; No rales, rhonchi, wheezing, or rubs + CW  tenderness  HEART: IRRegular rate and rhythm; No murmurs, rubs, or gallops  ABDOMEN: Soft, Nontender, Nondistended; no  masses Bowel sounds present  EXTREMITIES:  + Peripheral Pulses, No clubbing, cyanosis, or edema  LYMPH: No lymphadenopathy noted   RECTAL: deferred    SKIN: No rashes or lesions

## 2018-11-11 NOTE — ED PROVIDER NOTE - MEDICAL DECISION MAKING DETAILS
patient pw cp and sob, she has new onset afib. will need to rule out acs, electrolyte abnl. will start on bloodthinners

## 2018-11-11 NOTE — ED PROVIDER NOTE - OBJECTIVE STATEMENT
Pertinent PMH/PSH/FHx/SHx and Review of Systems contained within:  89F hx htn, dm, depression, hypothyroid pw cp and sob. patient had fallen off bed 2 days ago sustained right hip and chest pain. she was treated and released from the er. this morning the pain worsened and she told her daughter to call 911. EMS called to the seen and found her to be in afib. she also notes dull ha. no vision change, rash, bleeding, weakness, epistaxis, numbness, tingling, slurred speech, abd pain, vomiting, dysuria, fever, chills. patient did not take anything for symptoms this morning  Fh and Sh not otherwise contributory  ROS otherwise negative

## 2018-11-11 NOTE — ED PROVIDER NOTE - PHYSICAL EXAMINATION
Gen: Alert, elderly, frail appearing  Head: NC, AT   Eyes: PERRL, EOMI, normal lids/conjunctiva  ENT: diminished hearing, patent oropharynx without erythema/exudate, uvula midline  Neck: supple, no tenderness, Trachea midline  Pulm: Bilateral BS, normal resp effort, no wheeze/stridor/retractions  CV: irregular, no M/R/G, 2+ radial and dp pulses bl, no edema. cp reproducible   Abd: soft, NT/ND, +BS, no hepatosplenomegaly  Mskel: extremities x4 with normal ROM and no joint effusions. no ctl spine ttp.   Skin: bruise right hip  Neuro: AAOx3, no sensory/motor deficits, CN 2-12 intact

## 2018-11-12 LAB
ALBUMIN SERPL ELPH-MCNC: 2.8 G/DL — LOW (ref 3.3–5)
ALP SERPL-CCNC: 73 U/L — SIGNIFICANT CHANGE UP (ref 40–120)
ALT FLD-CCNC: 16 U/L — SIGNIFICANT CHANGE UP (ref 12–78)
ANION GAP SERPL CALC-SCNC: 7 MMOL/L — SIGNIFICANT CHANGE UP (ref 5–17)
AST SERPL-CCNC: 13 U/L — LOW (ref 15–37)
BILIRUB SERPL-MCNC: 0.6 MG/DL — SIGNIFICANT CHANGE UP (ref 0.2–1.2)
BUN SERPL-MCNC: 9 MG/DL — SIGNIFICANT CHANGE UP (ref 7–23)
CALCIUM SERPL-MCNC: 8 MG/DL — LOW (ref 8.5–10.1)
CHLORIDE SERPL-SCNC: 104 MMOL/L — SIGNIFICANT CHANGE UP (ref 96–108)
CHOLEST SERPL-MCNC: 150 MG/DL — SIGNIFICANT CHANGE UP (ref 10–199)
CK SERPL-CCNC: 77 U/L — SIGNIFICANT CHANGE UP (ref 26–192)
CO2 SERPL-SCNC: 28 MMOL/L — SIGNIFICANT CHANGE UP (ref 22–31)
CREAT SERPL-MCNC: 0.82 MG/DL — SIGNIFICANT CHANGE UP (ref 0.5–1.3)
GLUCOSE BLDC GLUCOMTR-MCNC: 107 MG/DL — HIGH (ref 70–99)
GLUCOSE BLDC GLUCOMTR-MCNC: 136 MG/DL — HIGH (ref 70–99)
GLUCOSE BLDC GLUCOMTR-MCNC: 183 MG/DL — HIGH (ref 70–99)
GLUCOSE SERPL-MCNC: 98 MG/DL — SIGNIFICANT CHANGE UP (ref 70–99)
HBA1C BLD-MCNC: 6.2 % — HIGH (ref 4–5.6)
HCT VFR BLD CALC: 35.5 % — SIGNIFICANT CHANGE UP (ref 34.5–45)
HDLC SERPL-MCNC: 63 MG/DL — SIGNIFICANT CHANGE UP
HGB BLD-MCNC: 11.7 G/DL — SIGNIFICANT CHANGE UP (ref 11.5–15.5)
LIPID PNL WITH DIRECT LDL SERPL: 66 MG/DL — SIGNIFICANT CHANGE UP
MCHC RBC-ENTMCNC: 31.2 PG — SIGNIFICANT CHANGE UP (ref 27–34)
MCHC RBC-ENTMCNC: 33 GM/DL — SIGNIFICANT CHANGE UP (ref 32–36)
MCV RBC AUTO: 94.7 FL — SIGNIFICANT CHANGE UP (ref 80–100)
NRBC # BLD: 0 /100 WBCS — SIGNIFICANT CHANGE UP (ref 0–0)
PLATELET # BLD AUTO: 187 K/UL — SIGNIFICANT CHANGE UP (ref 150–400)
POTASSIUM SERPL-MCNC: 3.5 MMOL/L — SIGNIFICANT CHANGE UP (ref 3.5–5.3)
POTASSIUM SERPL-SCNC: 3.5 MMOL/L — SIGNIFICANT CHANGE UP (ref 3.5–5.3)
PROT SERPL-MCNC: 5.7 GM/DL — LOW (ref 6–8.3)
RBC # BLD: 3.75 M/UL — LOW (ref 3.8–5.2)
RBC # FLD: 13.9 % — SIGNIFICANT CHANGE UP (ref 10.3–14.5)
SODIUM SERPL-SCNC: 139 MMOL/L — SIGNIFICANT CHANGE UP (ref 135–145)
T3 SERPL-MCNC: 71 NG/DL — LOW (ref 80–200)
T4 AB SER-ACNC: 7.3 UG/DL — SIGNIFICANT CHANGE UP (ref 4.6–12)
TOTAL CHOLESTEROL/HDL RATIO MEASUREMENT: 2.4 RATIO — LOW (ref 3.3–7.1)
TRIGL SERPL-MCNC: 105 MG/DL — SIGNIFICANT CHANGE UP (ref 10–149)
TROPONIN I SERPL-MCNC: <.015 NG/ML — SIGNIFICANT CHANGE UP (ref 0.01–0.04)
WBC # BLD: 7.55 K/UL — SIGNIFICANT CHANGE UP (ref 3.8–10.5)
WBC # FLD AUTO: 7.55 K/UL — SIGNIFICANT CHANGE UP (ref 3.8–10.5)

## 2018-11-12 RX ADMIN — SIMVASTATIN 10 MILLIGRAM(S): 20 TABLET, FILM COATED ORAL at 22:40

## 2018-11-12 RX ADMIN — Medication 650 MILLIGRAM(S): at 01:30

## 2018-11-12 RX ADMIN — Medication 650 MILLIGRAM(S): at 06:21

## 2018-11-12 RX ADMIN — Medication 650 MILLIGRAM(S): at 00:26

## 2018-11-12 RX ADMIN — Medication 650 MILLIGRAM(S): at 12:14

## 2018-11-12 RX ADMIN — Medication 650 MILLIGRAM(S): at 05:37

## 2018-11-12 RX ADMIN — ENOXAPARIN SODIUM 50 MILLIGRAM(S): 100 INJECTION SUBCUTANEOUS at 14:22

## 2018-11-12 RX ADMIN — Medication 25 MILLIGRAM(S): at 05:36

## 2018-11-12 RX ADMIN — AMLODIPINE BESYLATE 5 MILLIGRAM(S): 2.5 TABLET ORAL at 05:36

## 2018-11-12 RX ADMIN — Medication 650 MILLIGRAM(S): at 18:17

## 2018-11-12 RX ADMIN — PANTOPRAZOLE SODIUM 40 MILLIGRAM(S): 20 TABLET, DELAYED RELEASE ORAL at 07:31

## 2018-11-12 RX ADMIN — Medication 650 MILLIGRAM(S): at 17:34

## 2018-11-12 RX ADMIN — Medication 650 MILLIGRAM(S): at 13:15

## 2018-11-12 RX ADMIN — Medication 50 MICROGRAM(S): at 05:36

## 2018-11-12 RX ADMIN — LISINOPRIL 10 MILLIGRAM(S): 2.5 TABLET ORAL at 05:36

## 2018-11-12 RX ADMIN — Medication 25 MILLIGRAM(S): at 17:34

## 2018-11-12 NOTE — PROGRESS NOTE ADULT - SUBJECTIVE AND OBJECTIVE BOX
89y old  Female who presents with a chief complaint of chest pain  NEW  ONSET  AFIB (2018 08:12)      Review of Systems:    General:  No wt loss, fevers, chills, night sweats  Eyes:  Good vision, no reported pain  ENT:  No sore throat, pain, runny nose, dysphagia  CV:  chest pain, no palpitations, hypo/hypertension  Resp:  No dyspnea, cough, tachypnea, wheezing  GI:  No pain, nausea, vomiting, diarrhea, constipation  :  No pain, bleeding, incontinence, nocturia           Social History/Family History  SOCHX:   tobacco,  -  alcohol    FMHX: FA/MO  - contributory       Discussed with:  PMD, Family    Physical Exam:    Vital Signs:  Vital Signs Last 24 Hrs  T(C): 36.7 (2018 11:57), Max: 36.7 (2018 11:57)  T(F): 98 (2018 11:57), Max: 98 (2018 11:57)  HR: 88 (2018 11:57) (82 - 89)  BP: 113/66 (2018 11:57) (113/66 - 173/96)  BP(mean): --  RR: 16 (2018 11:57) (16 - 18)  SpO2: 99% (2018 11:57) (97% - 100%)  Daily Height in cm: 154.94 (2018 16:58)    Daily Weight in k.9 (2018 05:16)  I&O's Summary    2018 07:01  -  2018 15:40  --------------------------------------------------------  IN: 180 mL / OUT: 0 mL / NET: 180 mL          HEENT:  NC/AT, patent nares w/ pink mucosa, OP clear w/o lesions, PERRL, EOMI, conjunctivae clear, no thyromegaly, nodules, adenopathy, no JVD  Chest:  Full & symmetric excursion, no increased effort, breath sounds clear  Cardiovascular:  IRRegular rhythm, S1, S2, no murmur/rub/S3/S4, no carotid/femoral/abdominal bruit, radial/pedal pulses 2+, no edema  Abdomen:  Soft, non-tender, non-distended, normoactive bowel sounds, no HSM    Laboratory:                          11.7   7.55  )-----------( 187      ( 2018 07:03 )             35.5         139  |  104  |  9   ----------------------------<  98  3.5   |  28  |  0.82    Ca    8.0<L>      2018 07:03  Mg     2.4         TPro  5.7<L>  /  Alb  2.8<L>  /  TBili  0.6  /  DBili  x   /  AST  13<L>  /  ALT  16  /  AlkPhos  73        CARDIAC MARKERS ( 2018 07:03 )  <.015 ng/mL / x     / 77 U/L / x     / x      CARDIAC MARKERS ( 2018 21:27 )  <.015 ng/mL / x     / 89 U/L / x     / x      CARDIAC MARKERS ( 2018 14:22 )  <.015 ng/mL / x     / 98 U/L / x     / x      CARDIAC MARKERS ( 2018 10:21 )  <.015 ng/mL / x     / x     / x     / 2.3 ng/mL      CAPILLARY BLOOD GLUCOSE      POCT Blood Glucose.: 183 mg/dL (2018 11:35)  POCT Blood Glucose.: 107 mg/dL (2018 07:31)  POCT Blood Glucose.: 156 mg/dL (2018 21:28)    LIVER FUNCTIONS - ( 2018 07:03 )  Alb: 2.8 g/dL / Pro: 5.7 gm/dL / ALK PHOS: 73 U/L / ALT: 16 U/L / AST: 13 U/L / GGT: x           PT/INR - ( 2018 10:21 )   PT: 12.5 sec;   INR: 1.11 ratio           Urinalysis Basic - ( 2018 12:37 )    Color: Yellow / Appearance: Clear / S.015 / pH: x  Gluc: x / Ketone: Negative  / Bili: Negative / Urobili: 1 mg/dL   Blood: x / Protein: 30 mg/dL / Nitrite: Negative   Leuk Esterase: Trace / RBC: 3-5 /HPF / WBC 0-2   Sq Epi: x / Non Sq Epi: Few / Bacteria: Few        Assessment:    AFIB  Cause of CP likely RVR  Metoprolol effective  HTN, SBP managed  F/U TTE recent or new

## 2018-11-12 NOTE — PROGRESS NOTE ADULT - SUBJECTIVE AND OBJECTIVE BOX
INTERVAL HPI/OVERNIGHT EVENTS:        REVIEW OF SYSTEMS:  CONSTITUTIONAL: milt  chest pain      NECK: No pain or stiffnes  RESPIRATORY: No SOB   CARDIOVASCULAR: No  palpitations, no dizziness,   GASTROINTESTINAL: No abdominal pain. No nausea, vomiting,   NEUROLOGICAL: No headaches, no  blurry  vision no  dizziness  SKIN: No itching,   MUSCULOSKELETAL: No pain    MEDICATION:  acetaminophen   Tablet .. 650 milliGRAM(s) Oral every 6 hours  amLODIPine   Tablet 5 milliGRAM(s) Oral daily  dextrose 40% Gel 15 Gram(s) Oral once PRN  dextrose 5%. 1000 milliLiter(s) IV Continuous <Continuous>  dextrose 50% Injectable 12.5 Gram(s) IV Push once  dextrose 50% Injectable 25 Gram(s) IV Push once  dextrose 50% Injectable 25 Gram(s) IV Push once  enoxaparin Injectable 50 milliGRAM(s) SubCutaneous daily  glucagon  Injectable 1 milliGRAM(s) IntraMuscular once PRN  insulin lispro (HumaLOG) corrective regimen sliding scale   SubCutaneous at bedtime  levothyroxine 50 MICROGram(s) Oral daily  lisinopril 10 milliGRAM(s) Oral daily  metoprolol tartrate 25 milliGRAM(s) Oral two times a day  pantoprazole    Tablet 40 milliGRAM(s) Oral before breakfast  simvastatin 10 milliGRAM(s) Oral at bedtime    Vital Signs Last 24 Hrs  T(C): 36.1 (2018 05:16), Max: 36.6 (2018 10:23)  T(F): 96.9 (2018 05:16), Max: 97.8 (2018 10:23)  HR: 87 (2018 05:16) (82 - 109)  BP: 163/81 (2018 05:16) (149/99 - 173/96)  BP(mean): --  RR: 18 (2018 05:16) (16 - 19)  SpO2: 97% (2018 05:16) (97% - 100%)    PHYSICAL EXAM:  GENERAL: NAD, well-groomed, well-developed  EYES:  conjunctiva and sclera clear  ENMT:  Moist mucous membranes,   NECK: Supple, No JVD, Normal thyroid  NERVOUS SYSTEM:  Alert oriented   no  focal  deficits;   CHEST/LUNG: Clear    HEART: Regular rate and rhythm; No murmurs, rubs, or gallops  ABDOMEN: Soft, Nontender, Nondistended; Bowel sounds present  EXTREMITIES:  no  edema no  tenderness  SKIN: No rashes   CHEST  +  ant  chest  wall tenderness  LABS:                        11.7   7.55  )-----------( 187      ( 2018 07:03 )             35.5         139  |  104  |  9   ----------------------------<  98  3.5   |  28  |  0.82    Ca    8.0<L>      2018 07:03  Mg     2.4         TPro  5.7<L>  /  Alb  2.8<L>  /  TBili  0.6  /  DBili  x   /  AST  13<L>  /  ALT  16  /  AlkPhos  73      PT/INR - ( 2018 10:21 )   PT: 12.5 sec;   INR: 1.11 ratio           Urinalysis Basic - ( 2018 12:37 )    Color: Yellow / Appearance: Clear / S.015 / pH: x  Gluc: x / Ketone: Negative  / Bili: Negative / Urobili: 1 mg/dL   Blood: x / Protein: 30 mg/dL / Nitrite: Negative   Leuk Esterase: Trace / RBC: 3-5 /HPF / WBC 0-2   Sq Epi: x / Non Sq Epi: Few / Bacteria: Few      CAPILLARY BLOOD GLUCOSE      POCT Blood Glucose.: 107 mg/dL (2018 07:31)  POCT Blood Glucose.: 156 mg/dL (2018 21:28)  POCT Blood Glucose.: 250 mg/dL (2018 13:00)      RADIOLOGY & ADDITIONAL TESTS:    Imaging reports  Personally Reviewed:  [ x] YES  [ ] NO    Consultant(s) Notes Reviewed:  [x ] YES  [ ] NO    Care Discussed with Consultants/Other Providers [x ] YES  [ ] NO  Problem/Plan - 1:  ·  Problem: Paroxysmal atrial fibrillation.  Plan: monitor  on  telemetry  rate  control  anticoagualtion TTE  serial  troponins  Cardiology eval.    Problem/Plan - 2:  ·  Problem: Hypothyroid.  Plan: synthroid.     Problem/Plan - 3:  ·  Problem: Hypertension.  Plan: norvasc lisinopril metoprolol.     Problem/Plan - 4:  ·  Problem: Other chest pain.  Plan: tylenol.     Problem/Plan - 5:  ·  Problem: Diabetes.  Plan: insulin  coverage  as per  scale.     Problem/Plan - 6:  Problem: Unsteady gait. Plan: PT  evauation.  Chest  wall  pain  tylenol

## 2018-11-13 LAB
-  AMIKACIN: SIGNIFICANT CHANGE UP
-  AMOXICILLIN/CLAVULANIC ACID: SIGNIFICANT CHANGE UP
-  AMPICILLIN/SULBACTAM: SIGNIFICANT CHANGE UP
-  AMPICILLIN: SIGNIFICANT CHANGE UP
-  AZTREONAM: SIGNIFICANT CHANGE UP
-  CEFAZOLIN: SIGNIFICANT CHANGE UP
-  CEFEPIME: SIGNIFICANT CHANGE UP
-  CEFOXITIN: SIGNIFICANT CHANGE UP
-  CEFTRIAXONE: SIGNIFICANT CHANGE UP
-  CIPROFLOXACIN: SIGNIFICANT CHANGE UP
-  ERTAPENEM: SIGNIFICANT CHANGE UP
-  GENTAMICIN: SIGNIFICANT CHANGE UP
-  IMIPENEM: SIGNIFICANT CHANGE UP
-  LEVOFLOXACIN: SIGNIFICANT CHANGE UP
-  MEROPENEM: SIGNIFICANT CHANGE UP
-  NITROFURANTOIN: SIGNIFICANT CHANGE UP
-  PIPERACILLIN/TAZOBACTAM: SIGNIFICANT CHANGE UP
-  TIGECYCLINE: SIGNIFICANT CHANGE UP
-  TOBRAMYCIN: SIGNIFICANT CHANGE UP
-  TRIMETHOPRIM/SULFAMETHOXAZOLE: SIGNIFICANT CHANGE UP
CULTURE RESULTS: SIGNIFICANT CHANGE UP
GLUCOSE BLDC GLUCOMTR-MCNC: 159 MG/DL — HIGH (ref 70–99)
METHOD TYPE: SIGNIFICANT CHANGE UP
ORGANISM # SPEC MICROSCOPIC CNT: SIGNIFICANT CHANGE UP
ORGANISM # SPEC MICROSCOPIC CNT: SIGNIFICANT CHANGE UP
SPECIMEN SOURCE: SIGNIFICANT CHANGE UP

## 2018-11-13 RX ORDER — APIXABAN 2.5 MG/1
2.5 TABLET, FILM COATED ORAL EVERY 12 HOURS
Qty: 0 | Refills: 0 | Status: DISCONTINUED | OUTPATIENT
Start: 2018-11-13 | End: 2018-11-14

## 2018-11-13 RX ADMIN — Medication 25 MILLIGRAM(S): at 17:23

## 2018-11-13 RX ADMIN — APIXABAN 2.5 MILLIGRAM(S): 2.5 TABLET, FILM COATED ORAL at 17:23

## 2018-11-13 RX ADMIN — AMLODIPINE BESYLATE 5 MILLIGRAM(S): 2.5 TABLET ORAL at 06:07

## 2018-11-13 RX ADMIN — Medication 650 MILLIGRAM(S): at 06:47

## 2018-11-13 RX ADMIN — SIMVASTATIN 10 MILLIGRAM(S): 20 TABLET, FILM COATED ORAL at 22:14

## 2018-11-13 RX ADMIN — Medication 650 MILLIGRAM(S): at 00:44

## 2018-11-13 RX ADMIN — Medication 50 MICROGRAM(S): at 06:07

## 2018-11-13 RX ADMIN — Medication 650 MILLIGRAM(S): at 06:06

## 2018-11-13 RX ADMIN — PANTOPRAZOLE SODIUM 40 MILLIGRAM(S): 20 TABLET, DELAYED RELEASE ORAL at 06:07

## 2018-11-13 RX ADMIN — Medication 25 MILLIGRAM(S): at 06:07

## 2018-11-13 RX ADMIN — Medication 650 MILLIGRAM(S): at 17:23

## 2018-11-13 RX ADMIN — Medication 650 MILLIGRAM(S): at 01:00

## 2018-11-13 RX ADMIN — Medication 650 MILLIGRAM(S): at 19:12

## 2018-11-13 RX ADMIN — LISINOPRIL 10 MILLIGRAM(S): 2.5 TABLET ORAL at 06:07

## 2018-11-13 NOTE — DIETITIAN INITIAL EVALUATION ADULT. - PHYSICAL APPEARANCE
underweight/BMI = 20.4; no edema; Nutrition focused physical exam conducted:  Subcutaneous fat loss: [moderate ] Orbital fat pads region, [mild ]Buccal fat region, [moderate ]Triceps region,  [mild ]Ribs region.  Muscle wasting: [moderate ]Temples region, [moderate ]Clavicle region, [moderate ]Shoulder region, [moderate ]Scapula region, [moderate ]Interosseous region,  [WDL ]thigh region, [moderate ]Calf region

## 2018-11-13 NOTE — DIETITIAN INITIAL EVALUATION ADULT. - NUTRITION INTERVENTION
Medical Food Supplements/Meals and Snack Medical Food Supplements/Nutrition Education/Meals and Snack

## 2018-11-13 NOTE — PROGRESS NOTE ADULT - SUBJECTIVE AND OBJECTIVE BOX
89y old  Female who presents with a chief complaint of chest pain  NEW  [place]SET  AFIB (2018 08:12)      Review of Systems:    General:  No wt loss, fevers, chills, night sweats  Eyes:  Good vision, no reported pain  ENT:  No sore throat, pain, runny nose, dysphagia  CV:  chest pain, no palpitations, hypo/hypertension  Resp:  No dyspnea, cough, tachypnea, wheezing  GI:  No pain, nausea, vomiting, diarrhea, constipation  :  No pain, bleeding, incontinence, nocturia           Social History/Family History  SOCHX:   tobacco,  -  alcohol    FMHX: FA/MO  - contributory       Discussed with:  PMD, Family    Physical Exam:    Vital Signs:  Vital Signs Last 24 Hrs  T(C): 36.7 (2018 11:57), Max: 36.7 (2018 11:57)  T(F): 98 (2018 11:57), Max: 98 (2018 11:57)  HR: 88 (2018 11:57) (82 - 89)  BP: 113/66 (2018 11:57) (113/66 - 173/96)  BP(mean): --  RR: 16 (2018 11:57) (16 - 18)  SpO2: 99% (2018 11:57) (97% - 100%)  Daily Height in cm: 154.94 (2018 16:58)    Daily Weight in k.9 (2018 05:16)  I&O's Summary    2018 07:01  -  2018 15:40  --------------------------------------------------------  IN: 180 mL / OUT: 0 mL / NET: 180 mL          HEENT:  NC/AT, patent nares w/ pink mucosa, OP clear w/o lesions, PERRL, EOMI, conjunctivae clear, no thyromegaly, nodules, adenopathy, no JVD  Chest:  Full & symmetric excursion, no increased effort, breath sounds clear  Cardiovascular:  IRRegular rhythm, S1, S2, no murmur/rub/S3/S4, no carotid/femoral/abdominal bruit, radial/pedal pulses 2+, no edema  Abdomen:  Soft, non-tender, non-distended, normoactive bowel sounds, no HSM    Laboratory:                          11.7   7.55  )-----------( 187      ( 2018 07:03 )             35.5         139  |  104  |  9   ----------------------------<  98  3.5   |  28  |  0.82    Ca    8.0<L>      2018 07:03  Mg     2.4         TPro  5.7<L>  /  Alb  2.8<L>  /  TBili  0.6  /  DBili  x   /  AST  13<L>  /  ALT  16  /  AlkPhos  73        CARDIAC MARKERS ( 2018 07:03 )  <.015 ng/mL / x     / 77 U/L / x     / x      CARDIAC MARKERS ( 2018 21:27 )  <.015 ng/mL / x     / 89 U/L / x     / x      CARDIAC MARKERS ( 2018 14:22 )  <.015 ng/mL / x     / 98 U/L / x     / x      CARDIAC MARKERS ( 2018 10:21 )  <.015 ng/mL / x     / x     / x     / 2.3 ng/mL      CAPILLARY BLOOD GLUCOSE      POCT Blood Glucose.: 183 mg/dL (2018 11:35)  POCT Blood Glucose.: 107 mg/dL (2018 07:31)  POCT Blood Glucose.: 156 mg/dL (2018 21:28)    LIVER FUNCTIONS - ( 2018 07:03 )  Alb: 2.8 g/dL / Pro: 5.7 gm/dL / ALK PHOS: 73 U/L / ALT: 16 U/L / AST: 13 U/L / GGT: x           PT/INR - ( 2018 10:21 )   PT: 12.5 sec;   INR: 1.11 ratio           Urinalysis Basic - ( 2018 12:37 )    Color: Yellow / Appearance: Clear / S.015 / pH: x  Gluc: x / Ketone: Negative  / Bili: Negative / Urobili: 1 mg/dL   Blood: x / Protein: 30 mg/dL / Nitrite: Negative   Leuk Esterase: Trace / RBC: 3-5 /HPF / WBC 0-2   Sq Epi: x / Non Sq Epi: Few / Bacteria: Few        Assessment:    AFIB  Cause of CP likely RVR  Metoprolol effective  HTN, SBP managed  F/U TTE noted, EF 60%, no AS  AC is to be held in this patient, risk of bleeding at 89 years old is very high, and risk of fall with secondary bleed is increased.

## 2018-11-13 NOTE — DIETITIAN INITIAL EVALUATION ADULT. - PERTINENT LABORATORY DATA
11-12 Na139 mmol/L Glu 98 mg/dL K+ 3.5 mmol/L Cr  0.82 mg/dL BUN 9 mg/dL 11-12 Alb 2.8 g/dL<L> 11-12 [id number] 6.2 %<H> 11-12 Chol 150 mg/dL LDL 66 mg/dL HDL 63 mg/dL Trig 105 mg/dL, POCT: 107, 183, 136

## 2018-11-13 NOTE — CHART NOTE - NSCHARTNOTEFT_GEN_A_CORE
Upon Nutritional Assessment by the Registered Dietitian your patient was determined to meet criteria / has evidence of the following diagnosis/diagnoses:          [ ]  Mild Protein Calorie Malnutrition        [X ]  Moderate Protein Calorie Malnutrition        [ ] Severe Protein Calorie Malnutrition        [ ] Unspecified Protein Calorie Malnutrition        [ ] Underweight / BMI <19        [ ] Morbid Obesity / BMI > 40      Findings as based on:  •  Comprehensive nutrition assessment and consultation  •  Calorie counts (nutrient intake analysis)  •  Food acceptance and intake status from observations by staff  •  Follow up  •  Patient education  •  Intervention secondary to interdisciplinary rounds  •   concerns      Treatment:    The following diet has been recommended:  Liberalized diet to CCHO c snack + Glucerna x 2/day (provides 440 kcal, 20 g protein) for additional nutrition support      PROVIDER Section:     By signing this assessment you are acknowledging and agree with the diagnosis/diagnoses assigned by the Registered Dietitian    Comments:

## 2018-11-13 NOTE — PROGRESS NOTE ADULT - SUBJECTIVE AND OBJECTIVE BOX
INTERVAL HPI/OVERNIGHT EVENTS:        REVIEW OF SYSTEMS:  CONSTITUTIONAL:  no  complaints    NECK: No pain or stiffnes  RESPIRATORY: No SOB   CARDIOVASCULAR: No chest pain, palpitations, dizziness,   GASTROINTESTINAL: No abdominal pain. No nausea, vomiting,   NEUROLOGICAL: No headaches, no  blurry  vision no  dizziness  SKIN: No itching,   MUSCULOSKELETAL: No pain    MEDICATION:  acetaminophen   Tablet .. 650 milliGRAM(s) Oral every 6 hours  amLODIPine   Tablet 5 milliGRAM(s) Oral daily  apixaban 2.5 milliGRAM(s) Oral every 12 hours  dextrose 40% Gel 15 Gram(s) Oral once PRN  dextrose 5%. 1000 milliLiter(s) IV Continuous <Continuous>  dextrose 50% Injectable 12.5 Gram(s) IV Push once  dextrose 50% Injectable 25 Gram(s) IV Push once  dextrose 50% Injectable 25 Gram(s) IV Push once  enoxaparin Injectable 50 milliGRAM(s) SubCutaneous daily  glucagon  Injectable 1 milliGRAM(s) IntraMuscular once PRN  insulin lispro (HumaLOG) corrective regimen sliding scale   SubCutaneous at bedtime  levothyroxine 50 MICROGram(s) Oral daily  lisinopril 10 milliGRAM(s) Oral daily  metoprolol tartrate 25 milliGRAM(s) Oral two times a day  pantoprazole    Tablet 40 milliGRAM(s) Oral before breakfast  simvastatin 10 milliGRAM(s) Oral at bedtime    Vital Signs Last 24 Hrs  T(C): 36.7 (2018 05:29), Max: 36.7 (2018 11:57)  T(F): 98.1 (2018 05:29), Max: 98.1 (2018 05:29)  HR: 95 (2018 05:29) (88 - 96)  BP: 144/80 (2018 05:29) (113/66 - 168/70)  BP(mean): --  RR: 18 (2018 05:29) (16 - 18)  SpO2: 99% (2018 05:29) (98% - 100%)    PHYSICAL EXAM:  GENERAL: NAD, well-groomed, well-developed  EYES:  conjunctiva and sclera clear  ENMT:  Moist mucous membranes,   NECK: Supple, No JVD, Normal thyroid  NERVOUS SYSTEM:  Alert oriented   no  focal  deficits;   CHEST/LUNG: Clear    HEART: Regular rate and rhythm; No murmurs, rubs, or gallops  ABDOMEN: Soft, Nontender, Nondistended; Bowel sounds present  EXTREMITIES:  no  edema no  tenderness  SKIN: No rashes   LABS:                        11.7   7.55  )-----------( 187      ( 2018 07:03 )             35.5         139  |  104  |  9   ----------------------------<  98  3.5   |  28  |  0.82    Ca    8.0<L>      2018 07:03    TPro  5.7<L>  /  Alb  2.8<L>  /  TBili  0.6  /  DBili  x   /  AST  13<L>  /  ALT  16  /  AlkPhos  73  12      Urinalysis Basic - ( 2018 12:37 )    Color: Yellow / Appearance: Clear / S.015 / pH: x  Gluc: x / Ketone: Negative  / Bili: Negative / Urobili: 1 mg/dL   Blood: x / Protein: 30 mg/dL / Nitrite: Negative   Leuk Esterase: Trace / RBC: 3-5 /HPF / WBC 0-2   Sq Epi: x / Non Sq Epi: Few / Bacteria: Few      CAPILLARY BLOOD GLUCOSE      POCT Blood Glucose.: 136 mg/dL (2018 22:34)  POCT Blood Glucose.: 183 mg/dL (2018 11:35)      RADIOLOGY & ADDITIONAL TESTS:    Imaging reports  Personally Reviewed:  [x ] YES  [ ] NO    Consultant(s) Notes Reviewed:  [x ] YES  [ ] NO    Care Discussed with Consultants/Other Providers [x ] YES  [ ] NO  Problem/Plan - 1:  ·  Problem: Paroxysmal atrial fibrillation.  Plan: monitor  on  telemetry  rate  control  start  Eliquis      Problem/Plan - 2:  ·  Problem: Hypothyroid.  Plan: synthroid.     Problem/Plan - 3:  ·  Problem: Hypertension.  Plan: norvasc lisinopril metoprolol.     Problem/Plan - 4:  ·  Problem: Other chest pain.  Plan: tylenol.     Problem/Plan - 5:  ·  Problem: Diabetes.  Plan: insulin  coverage  as per  scale.     Problem/Plan - 6:  Problem: Unsteady gait. Plan: PT  evauation.  Chest  wall  pain  tylenol  dischrge  in  Am

## 2018-11-13 NOTE — DIETITIAN INITIAL EVALUATION ADULT. - ENERGY NEEDS
Height (cm): 154.94 (11-11), 175.26 (11-09)  Weight (kg): 49.1 (11-11)  BMI (kg/m2): 20.5 (11-11)  IBW:  47.7 kg +/- 10%    % IBW:   102%    UBW:  unknown     %UBW: unknown

## 2018-11-13 NOTE — DIETITIAN INITIAL EVALUATION ADULT. - OTHER INFO
Pt seen for consult - wt loss. Pt lives alone; has supportive daughter in [place] who shops for her; also friend goes to store for her; pt cooks for herself but lately reports eating more convenience & easy to prepare meals. Pt takes Glimepiride to control BG at home. Pt reports wt loss but was unable to provide specific info regarding amount or time frame. Advised RN no A1c taken. No reports of N/V/C/D or chew/swallowing difficulty. Pt willing to try Glucerna to supplement diet.

## 2018-11-14 ENCOUNTER — TRANSCRIPTION ENCOUNTER (OUTPATIENT)
Age: 83
End: 2018-11-14

## 2018-11-14 VITALS
SYSTOLIC BLOOD PRESSURE: 120 MMHG | OXYGEN SATURATION: 99 % | TEMPERATURE: 98 F | HEART RATE: 88 BPM | DIASTOLIC BLOOD PRESSURE: 63 MMHG | RESPIRATION RATE: 18 BRPM

## 2018-11-14 LAB
ALBUMIN SERPL ELPH-MCNC: 3 G/DL — LOW (ref 3.3–5)
ALP SERPL-CCNC: 84 U/L — SIGNIFICANT CHANGE UP (ref 40–120)
ALT FLD-CCNC: 16 U/L — SIGNIFICANT CHANGE UP (ref 12–78)
ANION GAP SERPL CALC-SCNC: 7 MMOL/L — SIGNIFICANT CHANGE UP (ref 5–17)
AST SERPL-CCNC: 11 U/L — LOW (ref 15–37)
BILIRUB SERPL-MCNC: 0.5 MG/DL — SIGNIFICANT CHANGE UP (ref 0.2–1.2)
BUN SERPL-MCNC: 17 MG/DL — SIGNIFICANT CHANGE UP (ref 7–23)
CALCIUM SERPL-MCNC: 8.7 MG/DL — SIGNIFICANT CHANGE UP (ref 8.5–10.1)
CHLORIDE SERPL-SCNC: 102 MMOL/L — SIGNIFICANT CHANGE UP (ref 96–108)
CO2 SERPL-SCNC: 29 MMOL/L — SIGNIFICANT CHANGE UP (ref 22–31)
CREAT SERPL-MCNC: 1.02 MG/DL — SIGNIFICANT CHANGE UP (ref 0.5–1.3)
CULTURE RESULTS: SIGNIFICANT CHANGE UP
CULTURE RESULTS: SIGNIFICANT CHANGE UP
GLUCOSE SERPL-MCNC: 119 MG/DL — HIGH (ref 70–99)
HCT VFR BLD CALC: 36.1 % — SIGNIFICANT CHANGE UP (ref 34.5–45)
HGB BLD-MCNC: 12 G/DL — SIGNIFICANT CHANGE UP (ref 11.5–15.5)
MCHC RBC-ENTMCNC: 31.3 PG — SIGNIFICANT CHANGE UP (ref 27–34)
MCHC RBC-ENTMCNC: 33.2 GM/DL — SIGNIFICANT CHANGE UP (ref 32–36)
MCV RBC AUTO: 94.3 FL — SIGNIFICANT CHANGE UP (ref 80–100)
NRBC # BLD: 0 /100 WBCS — SIGNIFICANT CHANGE UP (ref 0–0)
PLATELET # BLD AUTO: 215 K/UL — SIGNIFICANT CHANGE UP (ref 150–400)
POTASSIUM SERPL-MCNC: 3.8 MMOL/L — SIGNIFICANT CHANGE UP (ref 3.5–5.3)
POTASSIUM SERPL-SCNC: 3.8 MMOL/L — SIGNIFICANT CHANGE UP (ref 3.5–5.3)
PROT SERPL-MCNC: 6.1 GM/DL — SIGNIFICANT CHANGE UP (ref 6–8.3)
RBC # BLD: 3.83 M/UL — SIGNIFICANT CHANGE UP (ref 3.8–5.2)
RBC # FLD: 13.9 % — SIGNIFICANT CHANGE UP (ref 10.3–14.5)
SODIUM SERPL-SCNC: 138 MMOL/L — SIGNIFICANT CHANGE UP (ref 135–145)
SPECIMEN SOURCE: SIGNIFICANT CHANGE UP
SPECIMEN SOURCE: SIGNIFICANT CHANGE UP
WBC # BLD: 7.4 K/UL — SIGNIFICANT CHANGE UP (ref 3.8–10.5)
WBC # FLD AUTO: 7.4 K/UL — SIGNIFICANT CHANGE UP (ref 3.8–10.5)

## 2018-11-14 RX ORDER — ACETAMINOPHEN 500 MG
2 TABLET ORAL
Qty: 0 | Refills: 0 | DISCHARGE
Start: 2018-11-14

## 2018-11-14 RX ORDER — DILTIAZEM HCL 120 MG
240 CAPSULE, EXT RELEASE 24 HR ORAL DAILY
Qty: 0 | Refills: 0 | Status: DISCONTINUED | OUTPATIENT
Start: 2018-11-14 | End: 2018-11-14

## 2018-11-14 RX ORDER — DILTIAZEM HCL 120 MG
1 CAPSULE, EXT RELEASE 24 HR ORAL
Qty: 0 | Refills: 0 | DISCHARGE
Start: 2018-11-14

## 2018-11-14 RX ADMIN — PANTOPRAZOLE SODIUM 40 MILLIGRAM(S): 20 TABLET, DELAYED RELEASE ORAL at 06:56

## 2018-11-14 RX ADMIN — APIXABAN 2.5 MILLIGRAM(S): 2.5 TABLET, FILM COATED ORAL at 06:56

## 2018-11-14 RX ADMIN — Medication 650 MILLIGRAM(S): at 06:57

## 2018-11-14 RX ADMIN — Medication 50 MICROGRAM(S): at 06:56

## 2018-11-14 RX ADMIN — Medication 25 MILLIGRAM(S): at 06:56

## 2018-11-14 RX ADMIN — Medication 240 MILLIGRAM(S): at 12:28

## 2018-11-14 RX ADMIN — AMLODIPINE BESYLATE 5 MILLIGRAM(S): 2.5 TABLET ORAL at 06:56

## 2018-11-14 RX ADMIN — LISINOPRIL 10 MILLIGRAM(S): 2.5 TABLET ORAL at 06:56

## 2018-11-14 RX ADMIN — Medication 650 MILLIGRAM(S): at 07:15

## 2018-11-14 NOTE — DISCHARGE NOTE ADULT - MEDICATION SUMMARY - MEDICATIONS TO STOP TAKING
I will STOP taking the medications listed below when I get home from the hospital:    amlodipine 5 mg oral tablet  -- 1 tab(s) by mouth once a day

## 2018-11-14 NOTE — PROGRESS NOTE ADULT - SUBJECTIVE AND OBJECTIVE BOX
INTERVAL HPI/OVERNIGHT EVENTS:        REVIEW OF SYSTEMS:  CONSTITUTIONAL:  no  complaints    NECK: No pain or stiffnes  RESPIRATORY: No SOB   CARDIOVASCULAR: No chest pain, palpitations, dizziness,   GASTROINTESTINAL: No abdominal pain. No nausea, vomiting,   NEUROLOGICAL: No headaches, no  blurry  vision no  dizziness  SKIN: No itching,   MUSCULOSKELETAL: No pain    MEDICATION:  acetaminophen   Tablet .. 650 milliGRAM(s) Oral every 6 hours  apixaban 2.5 milliGRAM(s) Oral every 12 hours  dextrose 40% Gel 15 Gram(s) Oral once PRN  dextrose 5%. 1000 milliLiter(s) IV Continuous <Continuous>  dextrose 50% Injectable 12.5 Gram(s) IV Push once  dextrose 50% Injectable 25 Gram(s) IV Push once  dextrose 50% Injectable 25 Gram(s) IV Push once  diltiazem    milliGRAM(s) Oral daily  glucagon  Injectable 1 milliGRAM(s) IntraMuscular once PRN  insulin lispro (HumaLOG) corrective regimen sliding scale   SubCutaneous at bedtime  levothyroxine 50 MICROGram(s) Oral daily  lisinopril 10 milliGRAM(s) Oral daily  metoprolol tartrate 25 milliGRAM(s) Oral two times a day  pantoprazole    Tablet 40 milliGRAM(s) Oral before breakfast  simvastatin 10 milliGRAM(s) Oral at bedtime    Vital Signs Last 24 Hrs  T(C): 36.2 (14 Nov 2018 05:15), Max: 36.9 (13 Nov 2018 11:38)  T(F): 97.1 (14 Nov 2018 05:15), Max: 98.4 (13 Nov 2018 11:38)  HR: 94 (14 Nov 2018 05:15) (85 - 94)  BP: 160/65 (14 Nov 2018 05:15) (129/66 - 161/79)  BP(mean): --  RR: 18 (14 Nov 2018 05:15) (16 - 18)  SpO2: 98% (14 Nov 2018 05:15) (97% - 99%)    PHYSICAL EXAM:  GENERAL: NAD, well-groomed, well-developed  EYES:  conjunctiva and sclera clear  ENMT:  Moist mucous membranes,   NECK: Supple, No JVD, Normal thyroid  NERVOUS SYSTEM:  Alert oriented   no  focal  deficits;   CHEST/LUNG: Clear    HEART: IRRegular rate and rhythm; No murmurs, rubs, or gallops  ABDOMEN: Soft, Nontender, Nondistended; Bowel sounds present  EXTREMITIES:  no  edema no  tenderness  SKIN: No rashes   LABS:                        12.0   7.40  )-----------( 215      ( 14 Nov 2018 06:27 )             36.1     11-14    138  |  102  |  17  ----------------------------<  119<H>  3.8   |  29  |  1.02    Ca    8.7      14 Nov 2018 06:27    TPro  6.1  /  Alb  3.0<L>  /  TBili  0.5  /  DBili  x   /  AST  11<L>  /  ALT  16  /  AlkPhos  84  11-14        CAPILLARY BLOOD GLUCOSE      POCT Blood Glucose.: 159 mg/dL (13 Nov 2018 22:17)      RADIOLOGY & ADDITIONAL TESTS:    Imaging reports  Personally Reviewed:  [x ] YES  [ ] NO    Consultant(s) Notes Reviewed:  [x ] YES  [ ] NO    Care Discussed with Consultants/Other Providers [x ] YES  [ ] NO  Problem/Plan - 1:  ·  Problem: Paroxysmal atrial fibrillation.  Plan:  discussed with  cardiology  rate  control  no  Anticoagulation  R>B    will  change  norvasc  to  cardizem    Problem/Plan - 2:  ·  Problem: Hypothyroid.  Plan: synthroid.     Problem/Plan - 3:  ·  Problem: Hypertension.  Plan: norvasc lisinopril metoprolol.     Problem/Plan - 4:  ·  Problem: Other chest pain.  Plan: tylenol.     Problem/Plan - 5:  ·  Problem: Diabetes.  Plan: insulin  coverage  as per  scale.     Problem/Plan - 6:  Problem: Unsteady gait. Plan: PT  evaluation.  Chest  wall  pain  tylenol  discharge  home

## 2018-11-14 NOTE — DISCHARGE NOTE ADULT - CARE PROVIDER_API CALL
PMD,   Phone: (   )    -  Fax: (   )    -    Víctor Duran), Cardiology  [address] [place] [address]  Phone: [phone]  Fax: [phone]    [facility]), [place] [address]  Phone: [phone]  Fax: [phone]

## 2018-11-14 NOTE — DISCHARGE NOTE ADULT - CARE PROVIDERS DIRECT ADDRESSES
,DirectAddress_Unknown,[email or web address]

## 2018-11-14 NOTE — DISCHARGE NOTE ADULT - HOSPITAL COURSE
patient  monitored  on  telemetry  TTE  Rand ROTIZ  no  significant findings  chest  wall  pain  managed  with  Tylenol HR  controlled with  BB  was  anticoagulated after   further discussions  risk  stratifications  AC  discontinued  as  R>B in  89 yo patient  with bleed / fall  risk     norvasc  changed  to  Cardizem for  both  BP  and  rate  control with  one  medication  patient  to  follow  with  PMD   Cardiology to  call  me  if  any  further  questions  discussed  with  patient's  daughter

## 2018-11-14 NOTE — DISCHARGE NOTE ADULT - MEDICATION SUMMARY - MEDICATIONS TO TAKE
I will START or STAY ON the medications listed below when I get home from the hospital:    acetaminophen 325 mg oral tablet  -- 2 tab(s) by mouth every 6 hours  -- Indication: For Pain    lisinopril 10 mg oral tablet  -- 1 tab(s) by mouth once a day  -- Indication: For Hypertension    dilTIAZem 240 mg/24 hours oral capsule, extended release  -- 1 cap(s) by mouth once a day  -- Indication: For Paroxysmal atrial fibrillation    glimepiride 4 mg oral tablet  -- 1 tab(s) by mouth once a day  -- Indication: For Diabetes    simvastatin 10 mg oral tablet  -- 1 tab(s) by mouth once a day (at bedtime)  -- Indication: For Diabetes    Colace sodium 100 mg oral capsule  -- 1 cap(s) by mouth 2 times a day, As Needed - for constipation  -- Medication should be taken with plenty of water.      -- Indication: For constipation    omeprazole 20 mg oral delayed release tablet  -- 1 tab(s) by mouth once a day  -- Indication: For gerd    levothyroxine 50 mcg (0.05 mg) oral tablet  -- 1 tab(s) by mouth once a day  -- Indication: For Hypothyroidism

## 2018-11-14 NOTE — DISCHARGE NOTE ADULT - PATIENT PORTAL LINK FT
You can access the [facility], offered by [facility], by registering with the following website: [email or web address]

## 2018-11-14 NOTE — DISCHARGE NOTE ADULT - CARE PLAN
Principal Discharge DX:	Paroxysmal atrial fibrillation  Goal:	rate control  Assessment and plan of treatment:	monitor  VSS  labs  Secondary Diagnosis:	Hypertension  Secondary Diagnosis:	Diabetes  Secondary Diagnosis:	Hypothyroid  Secondary Diagnosis:	Hyperlipidemia  Secondary Diagnosis:	Other chest pain  Secondary Diagnosis:	PUD (peptic ulcer disease)

## 2018-11-20 DIAGNOSIS — E03.9 HYPOTHYROIDISM, UNSPECIFIED: ICD-10-CM

## 2018-11-20 DIAGNOSIS — E78.5 HYPERLIPIDEMIA, UNSPECIFIED: ICD-10-CM

## 2018-11-20 DIAGNOSIS — E44.0 MODERATE PROTEIN-CALORIE MALNUTRITION: ICD-10-CM

## 2018-11-20 DIAGNOSIS — F32.9 MAJOR DEPRESSIVE DISORDER, SINGLE EPISODE, UNSPECIFIED: ICD-10-CM

## 2018-11-20 DIAGNOSIS — I48.0 PAROXYSMAL ATRIAL FIBRILLATION: ICD-10-CM

## 2018-11-20 DIAGNOSIS — R26.81 UNSTEADINESS ON FEET: ICD-10-CM

## 2018-11-20 DIAGNOSIS — Z90.710 ACQUIRED ABSENCE OF BOTH CERVIX AND UTERUS: ICD-10-CM

## 2018-11-20 DIAGNOSIS — I10 ESSENTIAL (PRIMARY) HYPERTENSION: ICD-10-CM

## 2018-11-20 DIAGNOSIS — Z90.49 ACQUIRED ABSENCE OF OTHER SPECIFIED PARTS OF DIGESTIVE TRACT: ICD-10-CM

## 2018-11-20 DIAGNOSIS — Z79.2 LONG TERM (CURRENT) USE OF ANTIBIOTICS: ICD-10-CM

## 2018-11-20 DIAGNOSIS — R07.9 CHEST PAIN, UNSPECIFIED: ICD-10-CM

## 2018-11-20 DIAGNOSIS — E11.51 TYPE 2 DIABETES MELLITUS WITH DIABETIC PERIPHERAL ANGIOPATHY WITHOUT GANGRENE: ICD-10-CM

## 2018-11-30 ENCOUNTER — EMERGENCY (EMERGENCY)
Facility: HOSPITAL | Age: 83
LOS: 0 days | Discharge: HOME | End: 2018-11-30
Attending: EMERGENCY MEDICINE
Payer: MEDICARE

## 2018-11-30 VITALS
HEART RATE: 100 BPM | OXYGEN SATURATION: 99 % | SYSTOLIC BLOOD PRESSURE: 149 MMHG | RESPIRATION RATE: 17 BRPM | TEMPERATURE: 98 F | DIASTOLIC BLOOD PRESSURE: 79 MMHG

## 2018-11-30 VITALS
TEMPERATURE: 98 F | RESPIRATION RATE: 18 BRPM | DIASTOLIC BLOOD PRESSURE: 79 MMHG | OXYGEN SATURATION: 100 % | SYSTOLIC BLOOD PRESSURE: 177 MMHG | HEART RATE: 95 BPM

## 2018-11-30 DIAGNOSIS — M25.552 PAIN IN LEFT HIP: ICD-10-CM

## 2018-11-30 DIAGNOSIS — E11.9 TYPE 2 DIABETES MELLITUS WITHOUT COMPLICATIONS: ICD-10-CM

## 2018-11-30 DIAGNOSIS — T14.90XA INJURY, UNSPECIFIED, INITIAL ENCOUNTER: ICD-10-CM
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
## 2018-11-30 DIAGNOSIS — Z90.710 ACQUIRED ABSENCE OF BOTH CERVIX AND UTERUS: ICD-10-CM

## 2018-11-30 DIAGNOSIS — Z90.49 ACQUIRED ABSENCE OF OTHER SPECIFIED PARTS OF DIGESTIVE TRACT: ICD-10-CM

## 2018-11-30 DIAGNOSIS — Z88.9 ALLERGY STATUS TO UNSPECIFIED DRUGS, MEDICAMENTS AND BIOLOGICAL SUBSTANCES: ICD-10-CM

## 2018-11-30 DIAGNOSIS — E78.5 HYPERLIPIDEMIA, UNSPECIFIED: ICD-10-CM

## 2018-11-30 DIAGNOSIS — E03.9 HYPOTHYROIDISM, UNSPECIFIED: ICD-10-CM
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
## 2018-11-30 DIAGNOSIS — K25.9 GASTRIC ULCER, UNSPECIFIED AS ACUTE OR CHRONIC, WITHOUT HEMORRHAGE OR PERFORATION: ICD-10-CM

## 2018-11-30 DIAGNOSIS — Z98.890 OTHER SPECIFIED POSTPROCEDURAL STATES: Chronic | ICD-10-CM

## 2018-11-30 DIAGNOSIS — I10 ESSENTIAL (PRIMARY) HYPERTENSION: ICD-10-CM

## 2018-11-30 PROCEDURE — 73502 X-RAY EXAM HIP UNI 2-3 VIEWS: CPT | Mod: 26,LT

## 2018-11-30 PROCEDURE — 70450 CT HEAD/BRAIN W/O DYE: CPT | Mod: 26

## 2018-11-30 PROCEDURE — 72125 CT NECK SPINE W/O DYE: CPT | Mod: 26

## 2018-11-30 PROCEDURE — 99284 EMERGENCY DEPT VISIT MOD MDM: CPT

## 2018-11-30 RX ORDER — OXYCODONE AND ACETAMINOPHEN 5; 325 MG/1; MG/1
1 TABLET ORAL ONCE
Qty: 0 | Refills: 0 | Status: DISCONTINUED | OUTPATIENT
Start: 2018-11-30 | End: 2018-11-30

## 2018-11-30 NOTE — ED PROVIDER NOTE - MEDICAL DECISION MAKING DETAILS
imaging reviewed. patient received percocet. she is in good condition and being discharged with care instructions. patient is to follow up with pmd. Discussed results and outcome of testing with the patient.  Patient advised to please follow up with their primary care doctor within the next 24 hours and return to the Emergency Department for worsening symptoms or any other concerns.  Patient advised that their doctor may call  to follow up on the specific results of the tests performed today in the emergency department.

## 2018-11-30 NOTE — ED ADULT NURSE NOTE - NSIMPLEMENTINTERV_GEN_ALL_ED
Implemented All Fall with Harm Risk Interventions:  [place] to call system. Call bell, personal items and telephone within reach. Instruct patient to call for assistance. Room bathroom lighting operational. Non-slip footwear when patient is off stretcher. Physically safe environment: no spills, clutter or unnecessary equipment. Stretcher in lowest position, wheels locked, appropriate side rails in place. Provide visual cue, wrist band, yellow gown, etc. Monitor gait and stability. Monitor for mental status changes and reorient to person, place, and time. Review medications for side effects contributing to fall risk. Reinforce activity limits and safety measures with patient and family. Provide visual clues: red socks.

## 2018-11-30 NOTE — ED PROVIDER NOTE - OBJECTIVE STATEMENT
Pertinent PMH/PSH/FHx/SHx and Review of Systems contained within:  89 yo f with pmh of dm, htn, hld presents in ED c/o left hip pain s/p rolling off bed tonight. no loc. No aggravating or relieving factors, No fever/chills, No photophobia/eye pain/changes in vision, No ear pain/sore throat/dysphagia, No chest pain/palpitations, no SOB/cough/wheeze/stridor, No abdominal pain, No N/V/D, no dysuria/frequency/discharge, No neck/back pain, no rash, no changes in neurological status/function.

## 2018-11-30 NOTE — ED ADULT NURSE NOTE - OBJECTIVE STATEMENT
Pt is A&Ox4.  [place].  Pt states, " I was in bed when I had to go to the bathroom.  I slid onto the ground." Pt was able to activate her life alert button for EMS. Pt c/o fall and pain to the buttock.  Pt Denies hitting her head/LOC.  Pt is able to move extremities.  Pt has minor limit ROM in the left arm  Pt has a Hx  of left arm surgery.  The pt has B/L upper extremity bruising from a previous fall that resulted in a visit to the ED earlier this month.

## 2018-12-01 ENCOUNTER — OUTPATIENT (OUTPATIENT)
Dept: OUTPATIENT SERVICES | Facility: HOSPITAL | Age: 83
LOS: 1 days | End: 2018-12-01
Payer: MEDICARE

## 2018-12-01 DIAGNOSIS — Z98.890 OTHER SPECIFIED POSTPROCEDURAL STATES: Chronic | ICD-10-CM

## 2018-12-01 PROCEDURE — G9001: CPT

## 2018-12-03 DIAGNOSIS — Z71.89 OTHER SPECIFIED COUNSELING: ICD-10-CM

## 2019-04-12 NOTE — PHYSICAL THERAPY INITIAL EVALUATION ADULT - SHORT TERM MEMORY, REHAB EVAL
F/U appointment scheduled for Silke with Dr Matias for June 4th 2019 at ( 9am.  email sent to brother NATALIYA who has provided transportation in the past.  Also made aware of attempt to reach Silke to schedule mammogram screening as phone was disconnected when called.  
intact

## 2019-09-10 NOTE — ED ADULT TRIAGE NOTE - MEANS OF ARRIVAL
The patient should be called and told that the INR is in the therapeutic range and that he/she should continue the same dose and return for the next lab draw in one month.     Patient was notified that their INR result was within therapeutic range. Patient was instructed to continue taking their Coumadin/Warfarin as follows: 8 mg Mon, Wed, and Fri. 10 mg all other days  Next INR is due in 4 weeks on 10/10/19.  Patient was instructed to contact us with any unusual bleeding, bruising, any changes in medications, diet or health status and if there are any other questions or concerns.   Patient verbalized understanding of above.    Please advise patient stated that due to cost if she can recheck in 2 months           stretcher

## 2019-10-07 ENCOUNTER — INPATIENT (INPATIENT)
Facility: HOSPITAL | Age: 84
LOS: 3 days | Discharge: NURSING, REHAB OR LONG-TERM CARE | End: 2019-10-11
Attending: INTERNAL MEDICINE | Admitting: INTERNAL MEDICINE
Payer: MEDICARE

## 2019-10-07 VITALS
OXYGEN SATURATION: 100 % | DIASTOLIC BLOOD PRESSURE: 94 MMHG | WEIGHT: 119.93 LBS | TEMPERATURE: 98 F | RESPIRATION RATE: 18 BRPM | HEART RATE: 75 BPM | SYSTOLIC BLOOD PRESSURE: 146 MMHG | HEIGHT: 65 IN

## 2019-10-07 DIAGNOSIS — N30.00 ACUTE CYSTITIS WITHOUT HEMATURIA: ICD-10-CM

## 2019-10-07 DIAGNOSIS — E03.9 HYPOTHYROIDISM, UNSPECIFIED: ICD-10-CM

## 2019-10-07 DIAGNOSIS — I10 ESSENTIAL (PRIMARY) HYPERTENSION: ICD-10-CM

## 2019-10-07 DIAGNOSIS — E11.9 TYPE 2 DIABETES MELLITUS WITHOUT COMPLICATIONS: ICD-10-CM

## 2019-10-07 DIAGNOSIS — Z98.890 OTHER SPECIFIED POSTPROCEDURAL STATES: Chronic | ICD-10-CM

## 2019-10-07 DIAGNOSIS — E78.49 OTHER HYPERLIPIDEMIA: ICD-10-CM

## 2019-10-07 DIAGNOSIS — I50.21 ACUTE SYSTOLIC (CONGESTIVE) HEART FAILURE: ICD-10-CM

## 2019-10-07 DIAGNOSIS — I48.91 UNSPECIFIED ATRIAL FIBRILLATION: ICD-10-CM

## 2019-10-07 DIAGNOSIS — K25.9 GASTRIC ULCER, UNSPECIFIED AS ACUTE OR CHRONIC, WITHOUT HEMORRHAGE OR PERFORATION: ICD-10-CM

## 2019-10-07 LAB
ALBUMIN SERPL ELPH-MCNC: 3.6 G/DL — SIGNIFICANT CHANGE UP (ref 3.3–5)
ALP SERPL-CCNC: 77 U/L — SIGNIFICANT CHANGE UP (ref 40–120)
ALT FLD-CCNC: 16 U/L — SIGNIFICANT CHANGE UP (ref 12–78)
ANION GAP SERPL CALC-SCNC: 11 MMOL/L — SIGNIFICANT CHANGE UP (ref 5–17)
APPEARANCE UR: CLEAR — SIGNIFICANT CHANGE UP
APTT BLD: 30.5 SEC — SIGNIFICANT CHANGE UP (ref 27.5–36.3)
AST SERPL-CCNC: 14 U/L — LOW (ref 15–37)
BACTERIA # UR AUTO: ABNORMAL
BASOPHILS # BLD AUTO: 0.07 K/UL — SIGNIFICANT CHANGE UP (ref 0–0.2)
BASOPHILS NFR BLD AUTO: 0.8 % — SIGNIFICANT CHANGE UP (ref 0–2)
BILIRUB SERPL-MCNC: 0.9 MG/DL — SIGNIFICANT CHANGE UP (ref 0.2–1.2)
BILIRUB UR-MCNC: ABNORMAL
BUN SERPL-MCNC: 25 MG/DL — HIGH (ref 7–23)
CALCIUM SERPL-MCNC: 9.3 MG/DL — SIGNIFICANT CHANGE UP (ref 8.5–10.1)
CHLORIDE SERPL-SCNC: 103 MMOL/L — SIGNIFICANT CHANGE UP (ref 96–108)
CO2 SERPL-SCNC: 26 MMOL/L — SIGNIFICANT CHANGE UP (ref 22–31)
COLOR SPEC: YELLOW — SIGNIFICANT CHANGE UP
CREAT SERPL-MCNC: 1.54 MG/DL — HIGH (ref 0.5–1.3)
DIFF PNL FLD: ABNORMAL
EOSINOPHIL # BLD AUTO: 0.08 K/UL — SIGNIFICANT CHANGE UP (ref 0–0.5)
EOSINOPHIL NFR BLD AUTO: 0.9 % — SIGNIFICANT CHANGE UP (ref 0–6)
EPI CELLS # UR: SIGNIFICANT CHANGE UP
GLUCOSE BLDC GLUCOMTR-MCNC: 305 MG/DL — HIGH (ref 70–99)
GLUCOSE SERPL-MCNC: 203 MG/DL — HIGH (ref 70–99)
GLUCOSE UR QL: NEGATIVE MG/DL — SIGNIFICANT CHANGE UP
HCT VFR BLD CALC: 40.9 % — SIGNIFICANT CHANGE UP (ref 34.5–45)
HGB BLD-MCNC: 13.1 G/DL — SIGNIFICANT CHANGE UP (ref 11.5–15.5)
HYALINE CASTS # UR AUTO: ABNORMAL /LPF
IMM GRANULOCYTES NFR BLD AUTO: 0.5 % — SIGNIFICANT CHANGE UP (ref 0–1.5)
INR BLD: 1.34 RATIO — HIGH (ref 0.88–1.16)
KETONES UR-MCNC: ABNORMAL
LEUKOCYTE ESTERASE UR-ACNC: ABNORMAL
LYMPHOCYTES # BLD AUTO: 1.7 K/UL — SIGNIFICANT CHANGE UP (ref 1–3.3)
LYMPHOCYTES # BLD AUTO: 19.2 % — SIGNIFICANT CHANGE UP (ref 13–44)
MCHC RBC-ENTMCNC: 30.5 PG — SIGNIFICANT CHANGE UP (ref 27–34)
MCHC RBC-ENTMCNC: 32 GM/DL — SIGNIFICANT CHANGE UP (ref 32–36)
MCV RBC AUTO: 95.1 FL — SIGNIFICANT CHANGE UP (ref 80–100)
MONOCYTES # BLD AUTO: 0.62 K/UL — SIGNIFICANT CHANGE UP (ref 0–0.9)
MONOCYTES NFR BLD AUTO: 7 % — SIGNIFICANT CHANGE UP (ref 2–14)
NEUTROPHILS # BLD AUTO: 6.35 K/UL — SIGNIFICANT CHANGE UP (ref 1.8–7.4)
NEUTROPHILS NFR BLD AUTO: 71.6 % — SIGNIFICANT CHANGE UP (ref 43–77)
NITRITE UR-MCNC: NEGATIVE — SIGNIFICANT CHANGE UP
NRBC # BLD: 0 /100 WBCS — SIGNIFICANT CHANGE UP (ref 0–0)
NT-PROBNP SERPL-SCNC: HIGH PG/ML (ref 0–450)
PH UR: 5 — SIGNIFICANT CHANGE UP (ref 5–8)
PLATELET # BLD AUTO: 156 K/UL — SIGNIFICANT CHANGE UP (ref 150–400)
POTASSIUM SERPL-MCNC: 3.8 MMOL/L — SIGNIFICANT CHANGE UP (ref 3.5–5.3)
POTASSIUM SERPL-SCNC: 3.8 MMOL/L — SIGNIFICANT CHANGE UP (ref 3.5–5.3)
PROT SERPL-MCNC: 6.3 GM/DL — SIGNIFICANT CHANGE UP (ref 6–8.3)
PROT UR-MCNC: 100 MG/DL
PROTHROM AB SERPL-ACNC: 15.1 SEC — HIGH (ref 10–12.9)
RBC # BLD: 4.3 M/UL — SIGNIFICANT CHANGE UP (ref 3.8–5.2)
RBC # FLD: 14.4 % — SIGNIFICANT CHANGE UP (ref 10.3–14.5)
RBC CASTS # UR COMP ASSIST: ABNORMAL /HPF (ref 0–4)
SODIUM SERPL-SCNC: 140 MMOL/L — SIGNIFICANT CHANGE UP (ref 135–145)
SP GR SPEC: 1.02 — SIGNIFICANT CHANGE UP (ref 1.01–1.02)
TROPONIN I SERPL-MCNC: <.015 NG/ML — SIGNIFICANT CHANGE UP (ref 0.01–0.04)
TSH SERPL-MCNC: 2.96 UU/ML — SIGNIFICANT CHANGE UP (ref 0.36–3.74)
UROBILINOGEN FLD QL: 1 MG/DL
WBC # BLD: 8.86 K/UL — SIGNIFICANT CHANGE UP (ref 3.8–10.5)
WBC # FLD AUTO: 8.86 K/UL — SIGNIFICANT CHANGE UP (ref 3.8–10.5)
WBC UR QL: ABNORMAL

## 2019-10-07 PROCEDURE — 71045 X-RAY EXAM CHEST 1 VIEW: CPT | Mod: 26

## 2019-10-07 PROCEDURE — 99223 1ST HOSP IP/OBS HIGH 75: CPT | Mod: AI

## 2019-10-07 PROCEDURE — 99285 EMERGENCY DEPT VISIT HI MDM: CPT | Mod: GC

## 2019-10-07 PROCEDURE — 99223 1ST HOSP IP/OBS HIGH 75: CPT

## 2019-10-07 PROCEDURE — 93010 ELECTROCARDIOGRAM REPORT: CPT

## 2019-10-07 PROCEDURE — 70450 CT HEAD/BRAIN W/O DYE: CPT | Mod: 26

## 2019-10-07 RX ORDER — GLUCAGON INJECTION, SOLUTION 0.5 MG/.1ML
1 INJECTION, SOLUTION SUBCUTANEOUS ONCE
Refills: 0 | Status: DISCONTINUED | OUTPATIENT
Start: 2019-10-07 | End: 2019-10-11

## 2019-10-07 RX ORDER — SODIUM CHLORIDE 9 MG/ML
1000 INJECTION, SOLUTION INTRAVENOUS
Refills: 0 | Status: DISCONTINUED | OUTPATIENT
Start: 2019-10-07 | End: 2019-10-11

## 2019-10-07 RX ORDER — SODIUM CHLORIDE 9 MG/ML
500 INJECTION INTRAMUSCULAR; INTRAVENOUS; SUBCUTANEOUS ONCE
Refills: 0 | Status: DISCONTINUED | OUTPATIENT
Start: 2019-10-07 | End: 2019-10-07

## 2019-10-07 RX ORDER — FUROSEMIDE 40 MG
40 TABLET ORAL DAILY
Refills: 0 | Status: DISCONTINUED | OUTPATIENT
Start: 2019-10-07 | End: 2019-10-10

## 2019-10-07 RX ORDER — LEVOTHYROXINE SODIUM 125 MCG
50 TABLET ORAL DAILY
Refills: 0 | Status: DISCONTINUED | OUTPATIENT
Start: 2019-10-07 | End: 2019-10-11

## 2019-10-07 RX ORDER — DEXTROSE 50 % IN WATER 50 %
25 SYRINGE (ML) INTRAVENOUS ONCE
Refills: 0 | Status: DISCONTINUED | OUTPATIENT
Start: 2019-10-07 | End: 2019-10-11

## 2019-10-07 RX ORDER — DEXTROSE 50 % IN WATER 50 %
15 SYRINGE (ML) INTRAVENOUS ONCE
Refills: 0 | Status: DISCONTINUED | OUTPATIENT
Start: 2019-10-07 | End: 2019-10-11

## 2019-10-07 RX ORDER — DILTIAZEM HCL 120 MG
10 CAPSULE, EXT RELEASE 24 HR ORAL ONCE
Refills: 0 | Status: COMPLETED | OUTPATIENT
Start: 2019-10-07 | End: 2019-10-07

## 2019-10-07 RX ORDER — SIMVASTATIN 20 MG/1
10 TABLET, FILM COATED ORAL AT BEDTIME
Refills: 0 | Status: DISCONTINUED | OUTPATIENT
Start: 2019-10-07 | End: 2019-10-11

## 2019-10-07 RX ORDER — ACETAMINOPHEN 500 MG
650 TABLET ORAL EVERY 6 HOURS
Refills: 0 | Status: DISCONTINUED | OUTPATIENT
Start: 2019-10-07 | End: 2019-10-11

## 2019-10-07 RX ORDER — LISINOPRIL 2.5 MG/1
10 TABLET ORAL DAILY
Refills: 0 | Status: DISCONTINUED | OUTPATIENT
Start: 2019-10-07 | End: 2019-10-11

## 2019-10-07 RX ORDER — INSULIN LISPRO 100/ML
VIAL (ML) SUBCUTANEOUS AT BEDTIME
Refills: 0 | Status: DISCONTINUED | OUTPATIENT
Start: 2019-10-07 | End: 2019-10-11

## 2019-10-07 RX ORDER — CEFTRIAXONE 500 MG/1
1000 INJECTION, POWDER, FOR SOLUTION INTRAMUSCULAR; INTRAVENOUS EVERY 24 HOURS
Refills: 0 | Status: DISCONTINUED | OUTPATIENT
Start: 2019-10-07 | End: 2019-10-10

## 2019-10-07 RX ORDER — FUROSEMIDE 40 MG
40 TABLET ORAL ONCE
Refills: 0 | Status: COMPLETED | OUTPATIENT
Start: 2019-10-07 | End: 2019-10-07

## 2019-10-07 RX ORDER — DEXTROSE 50 % IN WATER 50 %
12.5 SYRINGE (ML) INTRAVENOUS ONCE
Refills: 0 | Status: DISCONTINUED | OUTPATIENT
Start: 2019-10-07 | End: 2019-10-11

## 2019-10-07 RX ORDER — DILTIAZEM HCL 120 MG
30 CAPSULE, EXT RELEASE 24 HR ORAL EVERY 8 HOURS
Refills: 0 | Status: DISCONTINUED | OUTPATIENT
Start: 2019-10-07 | End: 2019-10-08

## 2019-10-07 RX ORDER — PANTOPRAZOLE SODIUM 20 MG/1
40 TABLET, DELAYED RELEASE ORAL
Refills: 0 | Status: DISCONTINUED | OUTPATIENT
Start: 2019-10-07 | End: 2019-10-11

## 2019-10-07 RX ORDER — INFLUENZA VIRUS VACCINE 15; 15; 15; 15 UG/.5ML; UG/.5ML; UG/.5ML; UG/.5ML
0.5 SUSPENSION INTRAMUSCULAR ONCE
Refills: 0 | Status: COMPLETED | OUTPATIENT
Start: 2019-10-07 | End: 2019-10-07

## 2019-10-07 RX ORDER — INSULIN LISPRO 100/ML
VIAL (ML) SUBCUTANEOUS
Refills: 0 | Status: DISCONTINUED | OUTPATIENT
Start: 2019-10-07 | End: 2019-10-11

## 2019-10-07 RX ORDER — DOCUSATE SODIUM 100 MG
100 CAPSULE ORAL
Refills: 0 | Status: DISCONTINUED | OUTPATIENT
Start: 2019-10-07 | End: 2019-10-11

## 2019-10-07 RX ADMIN — CEFTRIAXONE 100 MILLIGRAM(S): 500 INJECTION, POWDER, FOR SOLUTION INTRAMUSCULAR; INTRAVENOUS at 22:19

## 2019-10-07 RX ADMIN — Medication 30 MILLIGRAM(S): at 14:11

## 2019-10-07 RX ADMIN — Medication 30 MILLIGRAM(S): at 21:55

## 2019-10-07 RX ADMIN — Medication 10 MILLIGRAM(S): at 09:05

## 2019-10-07 RX ADMIN — Medication 2: at 22:18

## 2019-10-07 RX ADMIN — Medication 40 MILLIGRAM(S): at 12:36

## 2019-10-07 RX ADMIN — Medication 10 MILLIGRAM(S): at 14:12

## 2019-10-07 RX ADMIN — SIMVASTATIN 10 MILLIGRAM(S): 20 TABLET, FILM COATED ORAL at 22:20

## 2019-10-07 NOTE — ED ADULT NURSE NOTE - HOW OFTEN DO YOU HAVE A DRINK CONTAINING ALCOHOL?
tachycardia  I have only seen sinus tachycardia in this patient which would suggest a metabolic disturbance such as anemia low albumin state and a possible infiltrative process on x-ray. Jenny ask that I speak with dr barry faulkner who has treated her in the past with diltiazem . she is currently on metoprolol. agree with plans for fluid resuscitation. tachycardia  I have only seen sinus tachycardia in this patient which would suggest a metabolic disturbance such as anemia low albumin state and a possible infiltrative process on x-ray (chronic fibrotic lung disease). Jenny ask that I speak with dr roberson who has treated her in the past with diltiazem . she is currently on metoprolol consider diltiazem. agree with plans for fluid resuscitation. Never

## 2019-10-07 NOTE — CONSULT NOTE ADULT - SUBJECTIVE AND OBJECTIVE BOX
CARDIOLOGY CONSULT NOTE    10-07-19 @ 13:52  JACKSON DAVENPORT is a 89y Female with a known history of HTN HLD BIBEMS from assisted living accompanied by daughter. Pt. is AOx0, per daughter this is not her baseline. Daughter provides history. States she has been with pt. since yesterday and she is increasingly confused, very repetitive and saying things that don't make sense. Reports she mentioned difficulty breathing today so she called the ambulance. Pt. currently denies SOB. Denies pain/discomfort. in the emergency room she was found to be in rapid atrial fibrillation - she denies palpitations chest pain or short of breath (07 Oct 2019 12:53)      REVIEW OF SYSTEMS:    CONSTITUTIONAL: No fever, weight loss, or fatigue  EYES: No eye pain, visual disturbances, or discharge  ENMT:  No difficulty hearing, tinnitus, vertigo; No sinus or throat pain  NECK: No pain or stiffness  BREASTS: No pain, masses, or nipple discharge  RESPIRATORY: No cough, wheezing, chills or hemoptysis; No shortness of breath  CARDIOVASCULAR: No chest pain, palpitations, dizziness, or leg swelling  GASTROINTESTINAL: No abdominal or epigastric pain. No nausea, vomiting, or hematemesis; No diarrhea or constipation. No melena or hematochezia.  GENITOURINARY: No dysuria, frequency, hematuria, or incontinence  NEUROLOGICAL: No headaches, memory loss, loss of strength, numbness, or tremors  SKIN: No itching, burning, rashes, or lesions   LYMPH NODES: No enlarged glands  ENDOCRINE: No heat or cold intolerance; No hair loss  MUSCULOSKELETAL: No joint pain or swelling; No muscle, back, or extremity pain  PSYCHIATRIC: No depression, anxiety, mood swings, or difficulty sleeping  HEME/LYMPH: No easy bruising, or bleeding gums  ALLERGY AND IMMUNOLOGIC: No hives or eczema    MEDICATIONS  (STANDING):  dextrose 5%. 1000 milliLiter(s) (50 mL/Hr) IV Continuous <Continuous>  dextrose 50% Injectable 12.5 Gram(s) IV Push once  dextrose 50% Injectable 25 Gram(s) IV Push once  dextrose 50% Injectable 25 Gram(s) IV Push once  diltiazem    Tablet 30 milliGRAM(s) Oral every 8 hours  diltiazem Injectable 10 milliGRAM(s) IV Push once  furosemide   Injectable 40 milliGRAM(s) IV Push daily  insulin lispro (HumaLOG) corrective regimen sliding scale   SubCutaneous three times a day before meals  insulin lispro (HumaLOG) corrective regimen sliding scale   SubCutaneous at bedtime  levothyroxine 50 MICROGram(s) Oral daily  lisinopril 10 milliGRAM(s) Oral daily  pantoprazole    Tablet 40 milliGRAM(s) Oral before breakfast  simvastatin 10 milliGRAM(s) Oral at bedtime    MEDICATIONS  (PRN):  acetaminophen   Tablet .. 650 milliGRAM(s) Oral every 6 hours PRN Temp greater or equal to 38C (100.4F), Mild Pain (1 - 3)  dextrose 40% Gel 15 Gram(s) Oral once PRN Blood Glucose LESS THAN 70 milliGRAM(s)/deciliter  docusate sodium 100 milliGRAM(s) Oral two times a day PRN Constipation  glucagon  Injectable 1 milliGRAM(s) IntraMuscular once PRN Glucose LESS THAN 70 milligrams/deciliter    acetaminophen 325 mg oral tablet: 2 tab(s) orally every 6 hours  Colace sodium 100 mg oral capsule: 1 cap(s) orally 2 times a day, As Needed - for constipation  dilTIAZem 240 mg/24 hours oral capsule, extended release: 1 cap(s) orally once a day  glimepiride 4 mg oral tablet: 1 tab(s) orally once a day  levothyroxine 50 mcg (0.05 mg) oral tablet: 1 tab(s) orally once a day  lisinopril 10 mg oral tablet: 1 tab(s) orally once a day  omeprazole 20 mg oral delayed release tablet: 1 tab(s) orally once a day  simvastatin 10 mg oral tablet: 1 tab(s) orally once a day (at bedtime)    ALLERGIES: ciprofloxacin (Unknown)  Flagyl (Hives)      FAMILY HISTORY: FAMILY HISTORY:      PHYSICAL EXAMINATION:  -----------------------------  T(C): 36.4 (10-07-19 @ 08:19), Max: 36.4 (10-07-19 @ 08:19)  HR: 97 (10-07-19 @ 09:13) (75 - 163)  BP: 148/90 (10-07-19 @ 09:13) (146/94 - 154/100)  RR: 25 (10-07-19 @ 09:13) (18 - 26)  SpO2: 99% (10-07-19 @ 09:13) (98% - 100%)  Wt(kg): --    Height (cm): 165.1 (10-07 @ 08:19)  Weight (kg): 54.4 (10-07 @ 08:19)  BMI (kg/m2): 20 (10-07 @ 08:19)  BSA (m2): 1.59 (10-07 @ 08:19)    Constitutional: well developed, normal appearance, well groomed, well nourished, no deformities and no acute distress.   Eyes: the conjunctiva exhibited no abnormalities and the eyelids demonstrated no xanthelasmas.   HEENT: normal oral mucosa, no oral pallor and no oral cyanosis.   Neck: normal jugular venous A waves present, normal jugular venous V waves present and no jugular venous [place] A waves.   Pulmonary: no respiratory distress, normal respiratory rhythm and effort, no accessory muscle use and lungs were clear to auscultation bilaterally.   Cardiovascular: heart rate and rhythm were normal, normal S1 and S2 and no murmur, gallop, rub, heave or thrill are present.   Abdomen: soft, non-tender, no hepato-splenomegaly and no abdominal mass palpated.   Musculoskeletal: the gait could not be assessed..   Extremities: no clubbing of the fingernails, no localized cyanosis, no petechial hemorrhages and no ischemic changes.   Skin: normal skin color and pigmentation, no rash, no venous stasis, no skin lesions, no skin ulcer and no xanthoma was observed.   Psychiatric: oriented to person, place, and time, the affect was normal, the mood was normal and not feeling anxious.     LABS:   --------  10-07    140  |  103  |  25<H>  ----------------------------<  203<H>  3.8   |  26  |  1.54<H>    Ca    9.3      07 Oct 2019 10:19    TPro  6.3  /  Alb  3.6  /  TBili  0.9  /  DBili  x   /  AST  14<L>  /  ALT  16  /  AlkPhos  77  10-07                         13.1   8.86  )-----------( 156      ( 07 Oct 2019 09:04 )< from: Xray Chest 1 View- PORTABLE-Routine (10.07.19 @ 09:07) >    EXAM:  XR CHEST PORTABLE ROUTINE 1V                            PROCEDURE DATE:  10/07/2019          INTERPRETATION:  Chest one view    HISTORY: Shortness of breath    COMPARISON STUDY: 11/11/2018    Frontal expiratory view of the chest shows the heart to be enlarged in   size. The lungs show mild basilar congestion with small left effusion and   there is no evidence of pneumothorax nor definite right pleural effusion.   Left shoulder prosthesis again noted.    IMPRESSION:  Congestive changes asnoted.     Thank you for the courtesy of this referral.                CANDIS GUTIERREZ M.D., ATTENDING RADIOLOGIST  This document has been electronically signed. Oct  7 2019  9:16AM                < end of copied text >               40.9     PT/INR - ( 07 Oct 2019 10:19 )   PT: 15.1 sec;   INR: 1.34 ratio         PTT - ( 07 Oct 2019 10:19 )  PTT:30.5 sec  10-07 @ 10:19 BNP: 27274 pg/mL    10-07 @ 10:19 CPK total:--, CKMB --, Troponin I - <.015 ng/mL        CARDIAC MARKERS ( 07 Oct 2019 10:19 )  <.015 ng/mL / x     / x     / x     / x            RADIOLOGY:  -----------------        ECG: CARDIOLOGY CONSULT NOTE    10-07-19 @ 13:52  JACKSON DAVENPORT is a 89y Female with a known history of HTN HLD.  BIBEMS from assisted living accompanied by daughter. Pt. is mildly confused but as per her daughter this is not her baseline. Daughter provides history. States she has been with pt. since yesterday and she is increasingly confused, very repetitive and saying things that don't make sense. Reports she mentioned difficulty breathing today upon walking so she called the ambulance. Pt. currently asymptomatic. Homebound, followed by Dr. Shepard. In the emergency room she was found to be in rapid atrial fibrillation - she denies palpitations chest pain or short of breath (07 Oct 2019 12:53)      REVIEW OF SYSTEMS: NA    MEDICATIONS  (STANDING):  dextrose 5%. 1000 milliLiter(s) (50 mL/Hr) IV Continuous <Continuous>  dextrose 50% Injectable 12.5 Gram(s) IV Push once  dextrose 50% Injectable 25 Gram(s) IV Push once  dextrose 50% Injectable 25 Gram(s) IV Push once  diltiazem    Tablet 30 milliGRAM(s) Oral every 8 hours  diltiazem Injectable 10 milliGRAM(s) IV Push once  furosemide   Injectable 40 milliGRAM(s) IV Push daily  insulin lispro (HumaLOG) corrective regimen sliding scale   SubCutaneous three times a day before meals  insulin lispro (HumaLOG) corrective regimen sliding scale   SubCutaneous at bedtime  levothyroxine 50 MICROGram(s) Oral daily  lisinopril 10 milliGRAM(s) Oral daily  pantoprazole    Tablet 40 milliGRAM(s) Oral before breakfast  simvastatin 10 milliGRAM(s) Oral at bedtime    MEDICATIONS  (PRN):  acetaminophen   Tablet .. 650 milliGRAM(s) Oral every 6 hours PRN Temp greater or equal to 38C (100.4F), Mild Pain (1 - 3)  dextrose 40% Gel 15 Gram(s) Oral once PRN Blood Glucose LESS THAN 70 milliGRAM(s)/deciliter  docusate sodium 100 milliGRAM(s) Oral two times a day PRN Constipation  glucagon  Injectable 1 milliGRAM(s) IntraMuscular once PRN Glucose LESS THAN 70 milligrams/deciliter    acetaminophen 325 mg oral tablet: 2 tab(s) orally every 6 hours  Colace sodium 100 mg oral capsule: 1 cap(s) orally 2 times a day, As Needed - for constipation  dilTIAZem 240 mg/24 hours oral capsule, extended release: 1 cap(s) orally once a day  glimepiride 4 mg oral tablet: 1 tab(s) orally once a day  levothyroxine 50 mcg (0.05 mg) oral tablet: 1 tab(s) orally once a day  lisinopril 10 mg oral tablet: 1 tab(s) orally once a day  omeprazole 20 mg oral delayed release tablet: 1 tab(s) orally once a day  simvastatin 10 mg oral tablet: 1 tab(s) orally once a day (at bedtime)    ALLERGIES: ciprofloxacin (Unknown)  Flagyl (Hives)      FAMILY HISTORY: FAMILY HISTORY:      PHYSICAL EXAMINATION:  -----------------------------  T(C): 36.4 (10-07-19 @ 08:19), Max: 36.4 (10-07-19 @ 08:19)  HR: 97 (10-07-19 @ 09:13) (75 - 163)  BP: 148/90 (10-07-19 @ 09:13) (146/94 - 154/100)  RR: 25 (10-07-19 @ 09:13) (18 - 26)  SpO2: 99% (10-07-19 @ 09:13) (98% - 100%)  Wt(kg): --    Height (cm): 165.1 (10-07 @ 08:19)  Weight (kg): 54.4 (10-07 @ 08:19)  BMI (kg/m2): 20 (10-07 @ 08:19)  BSA (m2): 1.59 (10-07 @ 08:19)    Constitutional: no deformities and no acute distress.   Eyes: the conjunctiva exhibited no abnormalities and the eyelids demonstrated no xanthelasmas.   HEENT: normal oral mucosa, no oral pallor and no oral cyanosis.   Neck: normal jugular venous A waves present, normal jugular venous V waves present and no jugular venous [place] A waves.   Pulmonary: no respiratory distress, normal respiratory rhythm and effort, no accessory muscle use and lungs were clear to auscultation bilaterally.   Cardiovascular: heart rate and rhythm were normal, diminished S1 and normal  S2 and 2/6 sys ejection murmur at LLSB, no gallop, rub, heave or thrill are present.   Abdomen: soft, non-tender, no hepato-splenomegaly and no abdominal mass palpated.   Musculoskeletal: the gait could not be assessed..   Extremities: no clubbing of the fingernails, no localized cyanosis, no petechial hemorrhages and no ischemic changes.   Skin: normal skin color and pigmentation, no rash, no venous stasis, no skin lesions, no skin ulcer and no xanthoma was observed.   Psychiatric: oriented to person, place, and time, the affect was normal, the mood was normal and not feeling anxious.     LABS:   --------  10-07    140  |  103  |  25<H>  ----------------------------<  203<H>  3.8   |  26  |  1.54<H>    Ca    9.3      07 Oct 2019 10:19    TPro  6.3  /  Alb  3.6  /  TBili  0.9  /  DBili  x   /  AST  14<L>  /  ALT  16  /  AlkPhos  77  10-07                         13.1   8.86  )-----------( 156      ( 07 Oct 2019 09:04 )< from: Xray Chest 1 View- PORTABLE-Routine (10.07.19 @ 09:07) >    EXAM:  XR CHEST PORTABLE ROUTINE 1V                            PROCEDURE DATE:  10/07/2019          INTERPRETATION:  Chest one view    HISTORY: Shortness of breath    COMPARISON STUDY: 11/11/2018    Frontal expiratory view of the chest shows the heart to be enlarged in   size. The lungs show mild basilar congestion with small left effusion and   there is no evidence of pneumothorax nor definite right pleural effusion.   Left shoulder prosthesis again noted.    IMPRESSION:  Congestive changes as noted.     Thank you for the courtesy of this referral.    CANDIS GUTIERREZ M.D., ATTENDING RADIOLOGIST  This document has been electronically signed. Oct  7 2019  9:16AM          < end of copied text >               40.9     PT/INR - ( 07 Oct 2019 10:19 )   PT: 15.1 sec;   INR: 1.34 ratio         PTT - ( 07 Oct 2019 10:19 )  PTT:30.5 sec  10-07 @ 10:19 BNP: 27994 pg/mL    10-07 @ 10:19 CPK total:--, CKMB --, Troponin I - <.015 ng/mL        CARDIAC MARKERS ( 07 Oct 2019 10:19 )  <.015 ng/mL / x     / x     / x     / x          ECG: AF with RVR, NSSTwave changes

## 2019-10-07 NOTE — H&P ADULT - ASSESSMENT
89f with history of Hypertension and diabetes presents with new onset atrial fibrillation     IMPROVE VTE Individual Risk Assessment          RISK                                                          Points  [  ] Previous VTE                                                3  [  ] Thrombophilia                                             2  [  ] Lower limb paralysis                                   2        (unable to hold up >15 seconds)    [  ] Current Cancer                                             2         (within 6 months)  [  ] Immobilization > 24 hrs                              1  [  ] ICU/CCU stay > 24 hours                             1  [  ] Age > 60                                                         1    IMPROVE VTE Score: 2

## 2019-10-07 NOTE — H&P ADULT - NSICDXPASTMEDICALHX_GEN_ALL_CORE_FT
PAST MEDICAL HISTORY:  DM (diabetes mellitus) type II controlled peripheral vascular disorder     Gastric ulcer     H/O abdominal hysterectomy     History of cholecystectomy     Hyperlipidemia     Hypertension     Hypothyroid

## 2019-10-07 NOTE — CONSULT NOTE ADULT - ASSESSMENT
full note to follow 89 yo female with likely paroxysmal AF, previously mentioned in hospitalization in 2018.  Advise rate control only at this time, not a candidate for AC due to debility and multiple fall history.  Reportedly poor PO intake may have exacerbated AF.  Social work consultation.

## 2019-10-07 NOTE — ED ADULT NURSE NOTE - OBJECTIVE STATEMENT
patient A&Ox2 to name and  , as per daughter Virginia , patient was c/o of difficulty breathing , patient  lives  by herself , as per daughter patient had a fall 3days ago unwitnessed , unknown if hit head , patient has a bruises R knee , daughter is concern because the patient is by herself most of the time , patient denied chest pain denied difficulty breathing at this time , place on cardiac monitor patient  Md aware no other orders at this time

## 2019-10-07 NOTE — ED ADULT TRIAGE NOTE - CHIEF COMPLAINT QUOTE
pt BIBEMS with complaint of SOB for a few days with loss of appetite and feeling "lonely", pt lives alone and has a visiting nurse. Pt has no complaints now, no SOB, no chest pain, no use of accessory muscles, VS noted.

## 2019-10-07 NOTE — H&P ADULT - HISTORY OF PRESENT ILLNESS
89yo F pmhx HTN HLD BIBEMS from assisted living accompanied by daughter. Pt. is AOx0, per daughter this is not her baseline. Daughter provides history. States she has been with pt. since yesterday and she is increasingly confused, very repetitive and saying things that don't make sense. Reports she mentioned difficulty breathing today so she called the ambulance. Pt. currently denies SOB. Denies pain/discomfort. 89yo F pmhx HTN HLD BIBEMS from assisted living accompanied by daughter. Pt. is AOx0, per daughter this is not her baseline. Daughter provides history. States she has been with pt. since yesterday and she is increasingly confused, very repetitive and saying things that don't make sense. Reports she mentioned difficulty breathing today so she called the ambulance. Pt. currently denies SOB. Denies pain/discomfort. in the emergency room she was found to be in rapid atrial fibrillation - she denies palpitations chest pain or short of breath

## 2019-10-07 NOTE — ED ADULT NURSE REASSESSMENT NOTE - NS ED NURSE REASSESS COMMENT FT1
patient in no acute distress Hr 90 as per Md patient OK to go to ct scan no need for cardia monitor or nurse for transportation to ct scan

## 2019-10-07 NOTE — ED ADULT NURSE NOTE - NSIMPLEMENTINTERV_GEN_ALL_ED
Implemented All Fall with Harm Risk Interventions:  [place] to call system. Call bell, personal items and telephone within reach. Instruct patient to call for assistance. Room bathroom lighting operational. Non-slip footwear when patient is off stretcher. Physically safe environment: no spills, clutter or unnecessary equipment. Stretcher in lowest position, wheels locked, appropriate side rails in place. Provide visual cue, wrist band, yellow gown, etc. Monitor gait and stability. Monitor for mental status changes and reorient to person, place, and time. Review medications for side effects contributing to fall risk. Reinforce activity limits and safety measures with patient and family. Provide visual clues: red socks.

## 2019-10-07 NOTE — ED PROVIDER NOTE - ATTENDING CONTRIBUTION TO CARE
89yo F from home with aide for a few hours, pmhx HTN HLD, hypothyroid, DM, h/o Afib, presents with not being herself. Pt currently AOx1, states that shes just not herself, tired more easily, unclear if it may be due to no living at home with her. Per daughter, noted shes more confused and sleeping more often. Pt reports compliance with meds. Currently denies cp, sob, dizziness.     Pt noted to be in rapid afib. HR 150s, /100.   Gen: Alert, Well appearing. NAD    Head: NC, AT, PERRL, normal lids/conjunctiva   ENT: Bilateral TM WNL, patent oropharynx without erythema/exudate, uvula midline  Neck: supple, no tenderness/meningismus  Pulm: Bilateral clear BS, normal resp effort  CV: + tachy, irregular, no M/R/G, +dist pulses   Abd: soft, NT/ND, +BS, no guarding/rebound tenderness  Mskel: no edema/erythema/cyanosis   Skin: no rash, no bruising  Neuro: AAOx2, no sensory/motor deficits, CN 2-12 intact     plan: labs, CE, ekg, cxr, ct, ua, cardizem and admit.    fatigue/delirium likely 2/2 to rapid afib will need admission.

## 2019-10-07 NOTE — PATIENT PROFILE ADULT - STATED REASON FOR ADMISSION
family states patient was complaining she couldn't breathe felt very weak and couldn't walk. also has complaints of fatigue

## 2019-10-07 NOTE — ED PROVIDER NOTE - OBJECTIVE STATEMENT
89yo F pmhx HTN HLD BIBEMS from assisted living accompanied by daughter. Pt. is AOx0, per daughter this is not her baseline. Daughter provides history. States she has been with pt. since yesterday and she is increasingly confused, very repetitive and saying things that don't make sense. Reports she mentioned difficulty breathing today so she called the ambulance. Pt. currently denies SOB. Denies pain/discomfort.

## 2019-10-07 NOTE — H&P ADULT - NSHPPHYSICALEXAM_GEN_ALL_CORE
ICU Vital Signs Last 24 Hrs  T(C): 36.4 (07 Oct 2019 08:19), Max: 36.4 (07 Oct 2019 08:19)  T(F): 97.5 (07 Oct 2019 08:19), Max: 97.5 (07 Oct 2019 08:19)  HR: 97 (07 Oct 2019 09:13) (75 - 163)  BP: 148/90 (07 Oct 2019 09:13) (146/94 - 154/100)  BP(mean): --  ABP: --  ABP(mean): --  RR: 25 (07 Oct 2019 09:13) (18 - 26)  SpO2: 99% (07 Oct 2019 09:13) (98% - 100%)  GENERAL: NAD well-developed  HEAD:  Atraumatic, Normocephalic  EYES: EOMI, PERRLA, conjunctiva and sclera clear  ENMT: No tonsillar erythema, exudates, or enlargement; Moist mucous membranes, Good dentition, No lesions  NECK: Supple, No JVD, Normal thyroid  NERVOUS SYSTEM:  Alert & Oriented X3, Good concentration; Motor Strength 5/5 B/L upper and lower extremities; DTRs 2+ intact and symmetric  CHEST/LUNG: Clear to percussion bilaterally; No rales, rhonchi, wheezing, or rubs  HEART: Regular rate and rhythm; No murmurs, rubs, or gallops  ABDOMEN: Soft, Nontender, Nondistended; Bowel sounds present  EXTREMITIES:  2+ Peripheral Pulses, No clubbing, cyanosis, or edema  LYMPH: No lymphadenopathy   SKIN: No rashes or lesions ICU Vital Signs Last 24 Hrs  T(C): 36.4 (07 Oct 2019 08:19), Max: 36.4 (07 Oct 2019 08:19)  T(F): 97.5 (07 Oct 2019 08:19), Max: 97.5 (07 Oct 2019 08:19)  HR: 97 (07 Oct 2019 09:13) (75 - 163)  BP: 148/90 (07 Oct 2019 09:13) (146/94 - 154/100)  BP(mean): --  ABP: --  ABP(mean): --  RR: 25 (07 Oct 2019 09:13) (18 - 26)  SpO2: 99% (07 Oct 2019 09:13) (98% - 100%)  GENERAL: NAD well-developed  HEAD:  Atraumatic, Normocephalic  EYES: EOMI, PERRLA, conjunctiva and sclera clear  ENMT: No tonsillar erythema, exudates, or enlargement; Moist mucous membranes, Good dentition, No lesions  NECK: Supple, No JVD, Normal thyroid  NERVOUS SYSTEM:  Alert & Oriented X3, Good concentration; Motor Strength 5/5 B/L upper and lower extremities; DTRs 2+ intact and symmetric  CHEST/LUNG: Clear to percussion bilaterally; No rales, rhonchi, wheezing, or rubs  HEART: tacky rate and  irregularrhythm; No murmurs, rubs, or gallops  ABDOMEN: Soft, Nontender, Nondistended; Bowel sounds present  EXTREMITIES:  2+ Peripheral Pulses, No clubbing, cyanosis, or edema  LYMPH: No lymphadenopathy   SKIN: No rashes or lesions

## 2019-10-08 LAB
ANION GAP SERPL CALC-SCNC: 10 MMOL/L — SIGNIFICANT CHANGE UP (ref 5–17)
BUN SERPL-MCNC: 27 MG/DL — HIGH (ref 7–23)
CALCIUM SERPL-MCNC: 9.3 MG/DL — SIGNIFICANT CHANGE UP (ref 8.5–10.1)
CHLORIDE SERPL-SCNC: 101 MMOL/L — SIGNIFICANT CHANGE UP (ref 96–108)
CO2 SERPL-SCNC: 28 MMOL/L — SIGNIFICANT CHANGE UP (ref 22–31)
CREAT SERPL-MCNC: 1.48 MG/DL — HIGH (ref 0.5–1.3)
CULTURE RESULTS: SIGNIFICANT CHANGE UP
GLUCOSE BLDC GLUCOMTR-MCNC: 129 MG/DL — HIGH (ref 70–99)
GLUCOSE SERPL-MCNC: 111 MG/DL — HIGH (ref 70–99)
HBA1C BLD-MCNC: 7.3 % — HIGH (ref 4–5.6)
MAGNESIUM SERPL-MCNC: 2.1 MG/DL — SIGNIFICANT CHANGE UP (ref 1.6–2.6)
POTASSIUM SERPL-MCNC: 3.3 MMOL/L — LOW (ref 3.5–5.3)
POTASSIUM SERPL-SCNC: 3.3 MMOL/L — LOW (ref 3.5–5.3)
SODIUM SERPL-SCNC: 139 MMOL/L — SIGNIFICANT CHANGE UP (ref 135–145)
SPECIMEN SOURCE: SIGNIFICANT CHANGE UP

## 2019-10-08 PROCEDURE — 99233 SBSQ HOSP IP/OBS HIGH 50: CPT

## 2019-10-08 PROCEDURE — 99232 SBSQ HOSP IP/OBS MODERATE 35: CPT

## 2019-10-08 RX ORDER — HEPARIN SODIUM 5000 [USP'U]/ML
5000 INJECTION INTRAVENOUS; SUBCUTANEOUS EVERY 12 HOURS
Refills: 0 | Status: DISCONTINUED | OUTPATIENT
Start: 2019-10-08 | End: 2019-10-11

## 2019-10-08 RX ORDER — DILTIAZEM HCL 120 MG
30 CAPSULE, EXT RELEASE 24 HR ORAL ONCE
Refills: 0 | Status: COMPLETED | OUTPATIENT
Start: 2019-10-08 | End: 2019-10-08

## 2019-10-08 RX ORDER — DILTIAZEM HCL 120 MG
10 CAPSULE, EXT RELEASE 24 HR ORAL ONCE
Refills: 0 | Status: COMPLETED | OUTPATIENT
Start: 2019-10-08 | End: 2019-10-08

## 2019-10-08 RX ORDER — DILTIAZEM HCL 120 MG
60 CAPSULE, EXT RELEASE 24 HR ORAL EVERY 8 HOURS
Refills: 0 | Status: DISCONTINUED | OUTPATIENT
Start: 2019-10-08 | End: 2019-10-11

## 2019-10-08 RX ORDER — POTASSIUM CHLORIDE 20 MEQ
40 PACKET (EA) ORAL EVERY 4 HOURS
Refills: 0 | Status: COMPLETED | OUTPATIENT
Start: 2019-10-08 | End: 2019-10-09

## 2019-10-08 RX ORDER — ASPIRIN/CALCIUM CARB/MAGNESIUM 324 MG
81 TABLET ORAL DAILY
Refills: 0 | Status: DISCONTINUED | OUTPATIENT
Start: 2019-10-08 | End: 2019-10-11

## 2019-10-08 RX ADMIN — Medication 40 MILLIEQUIVALENT(S): at 21:17

## 2019-10-08 RX ADMIN — Medication 60 MILLIGRAM(S): at 13:14

## 2019-10-08 RX ADMIN — PANTOPRAZOLE SODIUM 40 MILLIGRAM(S): 20 TABLET, DELAYED RELEASE ORAL at 05:34

## 2019-10-08 RX ADMIN — Medication 81 MILLIGRAM(S): at 18:02

## 2019-10-08 RX ADMIN — Medication 50 MICROGRAM(S): at 05:27

## 2019-10-08 RX ADMIN — LISINOPRIL 10 MILLIGRAM(S): 2.5 TABLET ORAL at 05:34

## 2019-10-08 RX ADMIN — Medication 60 MILLIGRAM(S): at 21:17

## 2019-10-08 RX ADMIN — Medication 10 MILLIGRAM(S): at 09:46

## 2019-10-08 RX ADMIN — Medication 30 MILLIGRAM(S): at 05:27

## 2019-10-08 RX ADMIN — Medication 2: at 12:35

## 2019-10-08 RX ADMIN — CEFTRIAXONE 100 MILLIGRAM(S): 500 INJECTION, POWDER, FOR SOLUTION INTRAMUSCULAR; INTRAVENOUS at 22:26

## 2019-10-08 RX ADMIN — SIMVASTATIN 10 MILLIGRAM(S): 20 TABLET, FILM COATED ORAL at 21:17

## 2019-10-08 RX ADMIN — Medication 40 MILLIGRAM(S): at 05:27

## 2019-10-08 RX ADMIN — Medication 30 MILLIGRAM(S): at 18:01

## 2019-10-08 NOTE — PROGRESS NOTE ADULT - SUBJECTIVE AND OBJECTIVE BOX
Patient is a 89y old  Female who presents with a chief complaint of     PAST MEDICAL & SURGICAL HISTORY:  History of cholecystectomy  H/O abdominal hysterectomy  Gastric ulcer  Hypothyroid  DM (diabetes mellitus) type II controlled peripheral vascular disorder  Hyperlipidemia  Hypertension  H/O shoulder surgery: left side    INTERVAL HISTORY: Resting in bed, not in any acute distress   	  MEDICATIONS:  MEDICATIONS  (STANDING):  cefTRIAXone   IVPB 1000 milliGRAM(s) IV Intermittent every 24 hours  dextrose 5%. 1000 milliLiter(s) (50 mL/Hr) IV Continuous <Continuous>  dextrose 50% Injectable 12.5 Gram(s) IV Push once  dextrose 50% Injectable 25 Gram(s) IV Push once  dextrose 50% Injectable 25 Gram(s) IV Push once  diltiazem    Tablet 60 milliGRAM(s) Oral every 8 hours  furosemide   Injectable 40 milliGRAM(s) IV Push daily  influenza   Vaccine 0.5 milliLiter(s) IntraMuscular once  insulin lispro (HumaLOG) corrective regimen sliding scale   SubCutaneous three times a day before meals  insulin lispro (HumaLOG) corrective regimen sliding scale   SubCutaneous at bedtime  levothyroxine 50 MICROGram(s) Oral daily  lisinopril 10 milliGRAM(s) Oral daily  pantoprazole    Tablet 40 milliGRAM(s) Oral before breakfast  simvastatin 10 milliGRAM(s) Oral at bedtime    MEDICATIONS  (PRN):  acetaminophen   Tablet .. 650 milliGRAM(s) Oral every 6 hours PRN Temp greater or equal to 38C (100.4F), Mild Pain (1 - 3)  dextrose 40% Gel 15 Gram(s) Oral once PRN Blood Glucose LESS THAN 70 milliGRAM(s)/deciliter  docusate sodium 100 milliGRAM(s) Oral two times a day PRN Constipation  glucagon  Injectable 1 milliGRAM(s) IntraMuscular once PRN Glucose LESS THAN 70 milligrams/deciliter    Vitals:  T(F): 97 (10-08-19 @ 11:50), Max: 98.2 (10-07-19 @ 23:16)  HR: 119 (10-08-19 @ 13:12) (90 - 122)  BP: 145/87 (10-08-19 @ 13:12) (136/76 - 161/91)  RR: 17 (10-08-19 @ 11:50) (17 - 17)  SpO2: 98% (10-08-19 @ 11:50) (94% - 98%)  Wt(kg): --51.2 kg     10-07 @ 07:01  -  10-08 @ 07:00  --------------------------------------------------------  IN:    Solution: 50 mL  Total IN: 50 mL    OUT:    Voided: 400 mL  Total OUT: 400 mL    Total NET: -350 mL    10-08 @ 07:01  -  10-08 @ 15:30  --------------------------------------------------------  IN:    Oral Fluid: 120 mL  Total IN: 120 mL    OUT:    Voided: 200 mL  Total OUT: 200 mL    Total NET: -80 mL    Weight (kg): 50.9 (10-07 @ 20:46)  BMI (kg/m2): 18.7 (10-07 @ 20:46)    PHYSICAL EXAM:  Neuro: Awake, responsive  CV: S1 S2 RRR  Lungs: CTABL  GI: Soft, BS +, ND, NT  Extremities: No edema    TELEMETRY: Atrial fibrillation     RADIOLOGY: 100-120s    DIAGNOSTIC TESTING:    [x ] Echocardiogram: < from: TTE Echo Doppler w/o Cont (11.12.18 @ 10:05) >   1. Left ventricular ejection fraction, by visual estimation, is 55 to   60%.   2. Normal global left ventricular systolic function.   3. Normal left ventricular internal cavity size.   4. Normal left ventricular size and wall thicknesses, with normal   systolic function.   5. Normal right ventricular size and function.   6. The left atrium is normal in size.   7. Normal right atrial size.   8. The right atrium is normal in size.   9. Trivial pericardial effusion.  10. Mild to moderate mitral valve regurgitation.  11. Mild tricuspid regurgitation.  12. Structurally normal tricuspid valve, with normal leaflet excursion.  13. Sclerotic aortic valve with normal opening.  14. Pulmonary hypertension is absent.  15. The main pulmonary artery is normal in size.  16. The aortic valve mean gradient is 1.7 mmHg consistent with normally   opening aortic valve.    < end of copied text >    LABS:	 	    CARDIAC MARKERS:  Troponin I, Serum: <.015 ng/mL (10-07 @ 10:19)    08 Oct 2019 10:33    139    |  101    |  27     ----------------------------<  111    3.3     |  28     |  1.48   07 Oct 2019 10:19    140    |  103    |  25     ----------------------------<  203    3.8     |  26     |  1.54     Ca    9.3        08 Oct 2019 10:33  Mg     2.1       08 Oct 2019 10:33    TPro  6.3    /  Alb  3.6    /  TBili  0.9    /  DBili  x      /  AST  14     /  ALT  16     /  AlkPhos  77     07 Oct 2019 10:19                       13.1   8.86  )-----------( 156      ( 07 Oct 2019 09:04 )             40.9   proBNP: Serum Pro-Brain Natriuretic Peptide: 81869 pg/mL (10-07 @ 10:19)     HgA1c: Hemoglobin A1C, Whole Blood: 7.3 % (10-08 @ 09:09)    TSH: Thyroid Stimulating Hormone, Serum: 2.960 uU/mL (10-07 @ 10:19)    INR: 1.34 ratio (10-07 @ 10:19) Patient is a 89y old  Female who presents with a chief complaint of SOB/AMS    PAST MEDICAL & SURGICAL HISTORY:  History of cholecystectomy  H/O abdominal hysterectomy  Gastric ulcer  Hypothyroid  DM (diabetes mellitus) type II controlled peripheral vascular disorder  Hyperlipidemia  Hypertension  H/O shoulder surgery: left side    INTERVAL HISTORY: Resting in bed, not in any acute distress   	  MEDICATIONS:  MEDICATIONS  (STANDING):  cefTRIAXone   IVPB 1000 milliGRAM(s) IV Intermittent every 24 hours  diltiazem    Tablet 60 milliGRAM(s) Oral every 8 hours  furosemide   Injectable 40 milliGRAM(s) IV Push daily  influenza   Vaccine 0.5 milliLiter(s) IntraMuscular once  insulin lispro (HumaLOG) corrective regimen sliding scale   SubCutaneous three times a day before meals  insulin lispro (HumaLOG) corrective regimen sliding scale   SubCutaneous at bedtime  levothyroxine 50 MICROGram(s) Oral daily  lisinopril 10 milliGRAM(s) Oral daily  pantoprazole    Tablet 40 milliGRAM(s) Oral before breakfast  simvastatin 10 milliGRAM(s) Oral at bedtime    MEDICATIONS  (PRN):  acetaminophen   Tablet .. 650 milliGRAM(s) Oral every 6 hours PRN Temp greater or equal to 38C (100.4F), Mild Pain (1 - 3)  dextrose 40% Gel 15 Gram(s) Oral once PRN Blood Glucose LESS THAN 70 milliGRAM(s)/deciliter  docusate sodium 100 milliGRAM(s) Oral two times a day PRN Constipation  glucagon  Injectable 1 milliGRAM(s) IntraMuscular once PRN Glucose LESS THAN 70 milligrams/deciliter    Vitals:  T(F): 97 (10-08-19 @ 11:50), Max: 98.2 (10-07-19 @ 23:16)  HR: 119 (10-08-19 @ 13:12) (90 - 122)  BP: 145/87 (10-08-19 @ 13:12) (136/76 - 161/91)  RR: 17 (10-08-19 @ 11:50) (17 - 17)  SpO2: 98% (10-08-19 @ 11:50) (94% - 98%)  Wt(kg): --51.2 kg     10-07 @ 07:01  -  10-08 @ 07:00  --------------------------------------------------------  IN:    Solution: 50 mL  Total IN: 50 mL    OUT:    Voided: 400 mL  Total OUT: 400 mL    Total NET: -350 mL    10-08 @ 07:01  -  10-08 @ 15:30  --------------------------------------------------------  IN:    Oral Fluid: 120 mL  Total IN: 120 mL    OUT:    Voided: 200 mL  Total OUT: 200 mL    Total NET: -80 mL    Weight (kg): 50.9 (10-07 @ 20:46)  BMI (kg/m2): 18.7 (10-07 @ 20:46)    PHYSICAL EXAM:  Neuro: Awake, responsive  CV: S1 S2 irregular, + SM  Lungs: CTABL  GI: Soft, BS +, ND, NT  Extremities: No edema    TELEMETRY: Atrial fibrillation 100-120s    RADIOLOGY: < from: Xray Chest 1 View- PORTABLE-Routine (10.07.19 @ 09:07) >  Frontal expiratory view of the chest shows the heart to be enlarged in   size. The lungs show mild basilar congestion with small left effusion and   there is no evidence of pneumothorax nor definite right pleural effusion.   Left shoulder prosthesis again noted.    IMPRESSION:  Congestive changes asnoted.     < end of copied text >    DIAGNOSTIC TESTING:    [x ] Echocardiogram: < from: TTE Echo Doppler w/o Cont (11.12.18 @ 10:05) >   1. Left ventricular ejection fraction, by visual estimation, is 55 to   60%.   2. Normal global left ventricular systolic function.   3. Normal left ventricular internal cavity size.   4. Normal left ventricular size and wall thicknesses, with normal   systolic function.   5. Normal right ventricular size and function.   6. The left atrium is normal in size.   7. Normal right atrial size.   8. The right atrium is normal in size.   9. Trivial pericardial effusion.  10. Mild to moderate mitral valve regurgitation.  11. Mild tricuspid regurgitation.  12. Structurally normal tricuspid valve, with normal leaflet excursion.  13. Sclerotic aortic valve with normal opening.  14. Pulmonary hypertension is absent.  15. The main pulmonary artery is normal in size.  16. The aortic valve mean gradient is 1.7 mmHg consistent with normally   opening aortic valve.    < end of copied text >    LABS:	 	    CARDIAC MARKERS:  Troponin I, Serum: <.015 ng/mL (10-07 @ 10:19)    08 Oct 2019 10:33    139    |  101    |  27     ----------------------------<  111    3.3     |  28     |  1.48   07 Oct 2019 10:19    140    |  103    |  25     ----------------------------<  203    3.8     |  26     |  1.54     Ca    9.3        08 Oct 2019 10:33  Mg     2.1       08 Oct 2019 10:33    TPro  6.3    /  Alb  3.6    /  TBili  0.9    /  DBili  x      /  AST  14     /  ALT  16     /  AlkPhos  77     07 Oct 2019 10:19                       13.1   8.86  )-----------( 156      ( 07 Oct 2019 09:04 )             40.9   proBNP: Serum Pro-Brain Natriuretic Peptide: 24077 pg/mL (10-07 @ 10:19)     HgA1c: Hemoglobin A1C, Whole Blood: 7.3 % (10-08 @ 09:09)    TSH: Thyroid Stimulating Hormone, Serum: 2.960 uU/mL (10-07 @ 10:19)    INR: 1.34 ratio (10-07 @ 10:19) Patient is a 89y old  Female who presents with a chief complaint of SOB/AMS    PAST MEDICAL & SURGICAL HISTORY:  History of cholecystectomy  H/O abdominal hysterectomy  Gastric ulcer  Hypothyroid  DM (diabetes mellitus) type II controlled peripheral vascular disorder  Hyperlipidemia  Hypertension  H/O shoulder surgery: left side    INTERVAL HISTORY: not in any acute distress, comfortable in bed with no complaints, daughter at bedside   	  MEDICATIONS:  MEDICATIONS  (STANDING):  cefTRIAXone   IVPB 1000 milliGRAM(s) IV Intermittent every 24 hours  diltiazem    Tablet 60 milliGRAM(s) Oral every 8 hours  furosemide   Injectable 40 milliGRAM(s) IV Push daily  influenza   Vaccine 0.5 milliLiter(s) IntraMuscular once  insulin lispro (HumaLOG) corrective regimen sliding scale   SubCutaneous three times a day before meals  insulin lispro (HumaLOG) corrective regimen sliding scale   SubCutaneous at bedtime  levothyroxine 50 MICROGram(s) Oral daily  lisinopril 10 milliGRAM(s) Oral daily  pantoprazole    Tablet 40 milliGRAM(s) Oral before breakfast  simvastatin 10 milliGRAM(s) Oral at bedtime    MEDICATIONS  (PRN):  acetaminophen   Tablet .. 650 milliGRAM(s) Oral every 6 hours PRN Temp greater or equal to 38C (100.4F), Mild Pain (1 - 3)  dextrose 40% Gel 15 Gram(s) Oral once PRN Blood Glucose LESS THAN 70 milliGRAM(s)/deciliter  docusate sodium 100 milliGRAM(s) Oral two times a day PRN Constipation  glucagon  Injectable 1 milliGRAM(s) IntraMuscular once PRN Glucose LESS THAN 70 milligrams/deciliter    Vitals:  T(F): 97 (10-08-19 @ 11:50), Max: 98.2 (10-07-19 @ 23:16)  HR: 119 (10-08-19 @ 13:12) (90 - 122)  BP: 145/87 (10-08-19 @ 13:12) (136/76 - 161/91)  RR: 17 (10-08-19 @ 11:50) (17 - 17)  SpO2: 98% (10-08-19 @ 11:50) (94% - 98%)  Wt(kg): --51.2 kg     10-07 @ 07:01  -  10-08 @ 07:00  --------------------------------------------------------  IN:    Solution: 50 mL  Total IN: 50 mL    OUT:    Voided: 400 mL  Total OUT: 400 mL    Total NET: -350 mL    10-08 @ 07:01  -  10-08 @ 15:30  --------------------------------------------------------  IN:    Oral Fluid: 120 mL  Total IN: 120 mL    OUT:    Voided: 200 mL  Total OUT: 200 mL    Total NET: -80 mL    Weight (kg): 50.9 (10-07 @ 20:46)  BMI (kg/m2): 18.7 (10-07 @ 20:46)    PHYSICAL EXAM:  Neuro: Awake, responsive  CV: S1 S2 irregular, + SM  Lungs: diminished to bases   GI: Soft, BS +, ND, NT  Extremities: No edema    TELEMETRY: Atrial fibrillation 100-120s    RADIOLOGY: < from: Xray Chest 1 View- PORTABLE-Routine (10.07.19 @ 09:07) >  Frontal expiratory view of the chest shows the heart to be enlarged in   size. The lungs show mild basilar congestion with small left effusion and   there is no evidence of pneumothorax nor definite right pleural effusion.   Left shoulder prosthesis again noted.    IMPRESSION:  Congestive changes asnoted.     < end of copied text >    DIAGNOSTIC TESTING:    [x ] Echocardiogram: < from: TTE Echo Doppler w/o Cont (11.12.18 @ 10:05) >   1. Left ventricular ejection fraction, by visual estimation, is 55 to   60%.   2. Normal global left ventricular systolic function.   3. Normal left ventricular internal cavity size.   4. Normal left ventricular size and wall thicknesses, with normal   systolic function.   5. Normal right ventricular size and function.   6. The left atrium is normal in size.   7. Normal right atrial size.   8. The right atrium is normal in size.   9. Trivial pericardial effusion.  10. Mild to moderate mitral valve regurgitation.  11. Mild tricuspid regurgitation.  12. Structurally normal tricuspid valve, with normal leaflet excursion.  13. Sclerotic aortic valve with normal opening.  14. Pulmonary hypertension is absent.  15. The main pulmonary artery is normal in size.  16. The aortic valve mean gradient is 1.7 mmHg consistent with normally   opening aortic valve.    < end of copied text >    LABS:	 	    CARDIAC MARKERS:  Troponin I, Serum: <.015 ng/mL (10-07 @ 10:19)    08 Oct 2019 10:33    139    |  101    |  27     ----------------------------<  111    3.3     |  28     |  1.48   07 Oct 2019 10:19    140    |  103    |  25     ----------------------------<  203    3.8     |  26     |  1.54     Ca    9.3        08 Oct 2019 10:33  Mg     2.1       08 Oct 2019 10:33    TPro  6.3    /  Alb  3.6    /  TBili  0.9    /  DBili  x      /  AST  14     /  ALT  16     /  AlkPhos  77     07 Oct 2019 10:19                       13.1   8.86  )-----------( 156      ( 07 Oct 2019 09:04 )             40.9   proBNP: Serum Pro-Brain Natriuretic Peptide: 24402 pg/mL (10-07 @ 10:19)     HgA1c: Hemoglobin A1C, Whole Blood: 7.3 % (10-08 @ 09:09)    TSH: Thyroid Stimulating Hormone, Serum: 2.960 uU/mL (10-07 @ 10:19)    INR: 1.34 ratio (10-07 @ 10:19)

## 2019-10-08 NOTE — CHART NOTE - NSCHARTNOTEFT_GEN_A_CORE
S: Called to see patient for persistent Rapid AFIb   HR ranging between 116-150  /90; Pulse ox 94%    HPI:  89yo F pmhx HTN HLD BIBEMS from assisted living accompanied by daughter. Pt. is AOx0, per daughter this is not her baseline. Daughter provides history. States she has been with pt. since yesterday and she is increasingly confused, very repetitive and saying things that don't make sense. Reports she mentioned difficulty breathing today so she called the ambulance. Pt. currently denies SOB. Denies pain/discomfort. in the emergency room she was found to be in rapid atrial fibrillation - she denies palpitations chest pain or short of breath (07 Oct 2019 12:53)      O: Vital Signs Last 24 Hrs  T(C): 36.8 (08 Oct 2019 04:39), Max: 36.8 (07 Oct 2019 23:16)  T(F): 98.2 (08 Oct 2019 04:39), Max: 98.2 (07 Oct 2019 23:16)  HR: 119 (08 Oct 2019 04:39) (99 - 142)  BP: 150/95 (08 Oct 2019 04:39) (134/81 - 158/97)  BP(mean): --  RR: 17 (08 Oct 2019 04:39) (17 - 23)  SpO2: 98% (08 Oct 2019 04:39) (96% - 100%)  I&O's Detail    07 Oct 2019 07:01  -  08 Oct 2019 07:00  --------------------------------------------------------  IN:    Solution: 50 mL  Total IN: 50 mL    OUT:    Voided: 400 mL  Total OUT: 400 mL    Total NET: -350 mL      General: alert and oriented to person  NAD  Resp: airway patent, respirations unlabored  CVS: Tachy irregular  Abdomen: soft, nontender, nondistended  Extremities: no edema  Skin: warm, dry, appropriate color                          13.1   8.86  )-----------( 156      ( 07 Oct 2019 09:04 )             40.9   10-07    140  |  103  |  25<H>  ----------------------------<  203<H>  3.8   |  26  |  1.54<H>    Ca    9.3      07 Oct 2019 10:19    TPro  6.3  /  Alb  3.6  /  TBili  0.9  /  DBili  x   /  AST  14<L>  /  ALT  16  /  AlkPhos  77  10-07      A/P :  1. Rapid AFib  - according to outpatient medication patient was on cardizem ER 240mg/ daily  at this time patient on 30mg Q 8   - per cardiology note  PAF patient not a candidate for A/C due to history of falls   -will give an additional dose of cardizem 10mg IVP X 1   -will confirm home dose of cardizem  -echo pending   -if AFIb persists ? cardizem ggt.  -discussed with cardiology NP

## 2019-10-08 NOTE — PROGRESS NOTE ADULT - SUBJECTIVE AND OBJECTIVE BOX
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 She is lying in bed in NAD.    MEDICATIONS  (STANDING):  aspirin enteric coated 81 milliGRAM(s) Oral daily  cefTRIAXone   IVPB 1000 milliGRAM(s) IV Intermittent every 24 hours  dextrose 5%. 1000 milliLiter(s) (50 mL/Hr) IV Continuous <Continuous>  dextrose 50% Injectable 12.5 Gram(s) IV Push once  dextrose 50% Injectable 25 Gram(s) IV Push once  dextrose 50% Injectable 25 Gram(s) IV Push once  diltiazem    Tablet 60 milliGRAM(s) Oral every 8 hours  furosemide   Injectable 40 milliGRAM(s) IV Push daily  influenza   Vaccine 0.5 milliLiter(s) IntraMuscular once  insulin lispro (HumaLOG) corrective regimen sliding scale   SubCutaneous three times a day before meals  insulin lispro (HumaLOG) corrective regimen sliding scale   SubCutaneous at bedtime  levothyroxine 50 MICROGram(s) Oral daily  lisinopril 10 milliGRAM(s) Oral daily  pantoprazole    Tablet 40 milliGRAM(s) Oral before breakfast  simvastatin 10 milliGRAM(s) Oral at bedtime    MEDICATIONS  (PRN):  acetaminophen   Tablet .. 650 milliGRAM(s) Oral every 6 hours PRN Temp greater or equal to 38C (100.4F), Mild Pain (1 - 3)  dextrose 40% Gel 15 Gram(s) Oral once PRN Blood Glucose LESS THAN 70 milliGRAM(s)/deciliter  docusate sodium 100 milliGRAM(s) Oral two times a day PRN Constipation  glucagon  Injectable 1 milliGRAM(s) IntraMuscular once PRN Glucose LESS THAN 70 milligrams/deciliter      Allergies    ciprofloxacin (Unknown)  Flagyl (Hives)    Intolerances        Vital Signs Last 24 Hrs  T(C): 36.4 (08 Oct 2019 17:08), Max: 36.8 (07 Oct 2019 23:16)  T(F): 97.6 (08 Oct 2019 17:08), Max: 98.2 (07 Oct 2019 23:16)  HR: 91 (08 Oct 2019 17:08) (90 - 122)  BP: 140/72 (08 Oct 2019 17:08) (140/72 - 161/91)  BP(mean): --  RR: 17 (08 Oct 2019 17:08) (17 - 17)  SpO2: 97% (08 Oct 2019 17:08) (94% - 98%)    PHYSICAL EXAM:  GENERAL: NAD, well-groomed, well-developed  HEAD:  Atraumatic, Normocephalic  EYES: EOMI, PERRLA   NECK: Supple   NERVOUS SYSTEM: Alert    CHEST/LUNG: Clear to auscultation bilaterally; No rales, rhonchi, wheezing, or rubs  HEART: Regular rate and rhythm; No murmurs, rubs, or gallops  ABDOMEN: Soft, Nontender, Nondistended; Bowel sounds present  EXTREMITIES:   No clubbing, cyanosis, or edema       LABS:                        13.1   8.86  )-----------( 156      ( 07 Oct 2019 09:04 )             40.9     10-08    139  |  101  |  27<H>  ----------------------------<  111<H>  3.3<L>   |  28  |  1.48<H>    Ca    9.3      08 Oct 2019 10:33  Mg     2.1     10-    TPro  6.3  /  Alb  3.6  /  TBili  0.9  /  DBili  x   /  AST  14<L>  /  ALT  16  /  AlkPhos  77  10-07    PT/INR - ( 07 Oct 2019 10:19 )   PT: 15.1 sec;   INR: 1.34 ratio         PTT - ( 07 Oct 2019 10:19 )  PTT:30.5 sec  Urinalysis Basic - ( 07 Oct 2019 11:21 )    Color: Yellow / Appearance: Clear / S.025 / pH: x  Gluc: x / Ketone: Small  / Bili: Small / Urobili: 1 mg/dL   Blood: x / Protein: 100 mg/dL / Nitrite: Negative   Leuk Esterase: Trace / RBC: 3-5 /HPF / WBC 6-10   Sq Epi: x / Non Sq Epi: Occasional / Bacteria: Many      CAPILLARY BLOOD GLUCOSE      POCT Blood Glucose.: 130 mg/dL (08 Oct 2019 17:01)  POCT Blood Glucose.: 206 mg/dL (08 Oct 2019 12:01)  POCT Blood Glucose.: 129 mg/dL (08 Oct 2019 08:45)  POCT Blood Glucose.: 305 mg/dL (07 Oct 2019 22:11)      Culture - Urine (collected 07 Oct 2019 15:19)  Source: .Urine  Final Report (08 Oct 2019 13:47):    <10,000 CFU/mL Normal Urogenital Danae      RADIOLOGY & ADDITIONAL TESTS:    10-07-19 @ 07:01  -  10-08-19 @ 07:00  --------------------------------------------------------  IN:    Solution: 50 mL  Total IN: 50 mL    OUT:    Voided: 400 mL  Total OUT: 400 mL    Total NET: -350 mL      10-08-19 @ 07:01  -  10-08-19 @ 20:09  --------------------------------------------------------  IN:    Oral Fluid: 120 mL  Total IN: 120 mL    OUT:    Voided: 200 mL  Total OUT: 200 mL    Total NET: -80 mL

## 2019-10-08 NOTE — PROGRESS NOTE ADULT - ASSESSMENT
89 yo female with PMHx of HTN, DM2, HLD, Hypothyroidism,  paroxysmal AF - previously mentioned in hospitalization in 2018, p/w increasing confusion/SOB from Assisted Living, found to have afib with RVR.  Trop neg x1, BNP ~25941  Chest X-ray: Congestive changes   Echo from 2018 with normal EF    ·	Atrial fibrillation with RVR  ·	Acute on chronic HF,  echo pending   ·	Essential HTN  ·	HLD  ·	DM1  ·	Hypothyroidism   ·	Hypokalemia    Plan  - continue with telemetry   -Cardizem increased to 60 mg for HR control,  responded well to Cardizem 10 mg IVP x 1 this am for RVR, will increase dose as needed/ will consider Cardizem drip if needed for HR control  -Advise rate control only at this time, not a candidate for AC due to debility and multiple fall history, ? asa  -Cont with IV Lasix for now, monitor renal function/electrolytes/daily weight   - Cont with lisinopril for BP control  -Diabetic management as per primary team    -Cont statin  -Cont with synthroid, TSH 2.960 89 yo female with PMHx of HTN, DM2, HLD, Hypothyroidism,  paroxysmal AF - previously mentioned in hospitalization in 2018, p/w increasing confusion/SOB from Assisted Living, found to have afib with RVR.  Trop neg x1, BNP ~74214  Chest X-ray: Congestive changes   Echo from 2018 with normal EF    ·	Atrial fibrillation with RVR  ·	Acute on chronic HF,  echo pending   ·	Essential HTN  ·	HLD  ·	DM1  ·	Hypothyroidism   ·	Hypokalemia    Plan  - continue with telemetry   -Cardizem increased to 60 mg for HR control,  responded well to Cardizem 10 mg IVP x 1 this am for RVR, will increase dose as needed/ will consider Cardizem drip if needed for HR control  -Advise rate control only at this time, not a candidate for AC due to debility and multiple fall history, ? asa  -Cont with IV Lasix for now, monitor renal function/electrolytes/daily weight, supplement potassium   -Cont with lisinopril for BP control  -Diabetic management as per primary team    -Cont statin  -Cont with synthroid, TSH 2.960 89 yo female with PMHx of HTN, DM2, HLD, Hypothyroidism,  paroxysmal AF - previously mentioned in hospitalization in 2018, p/w increasing confusion/SOB  found to have afib with RVR.  Trop neg x1, BNP ~47980  Chest X-ray: Congestive changes   Echo from 2018 with normal EF    ·	Atrial fibrillation with RVR  ·	Acute on chronic HF,  echo pending   ·	Essential HTN  ·	HLD  ·	DM1  ·	Hypothyroidism   ·	Hypokalemia    Plan  - continue with telemetry   -Cardizem increased to 60 mg for HR control,  responded well to Cardizem 10 mg IVP x 1 this am for RVR, will increase dose as needed/ will consider Cardizem drip if needed for HR control or add metoprolol based on echo findings   -Not a candidate for AC due to debility and multiple fall history, will add low dose asa - no h/o any acute bleeding history as per daughter   -Cont with IV Lasix for now, monitor renal function/electrolytes/daily weight, supplement potassium   -Cont with lisinopril for BP control  -Diabetic management as per primary team    -Cont statin  -Cont with synthroid, TSH 2.960    -Activity as tolerated

## 2019-10-08 NOTE — PROGRESS NOTE ADULT - ASSESSMENT
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    Atrial fibrillation with RVR  - c/w Cardizem  - cardiology following   - TSH is 2.96  - Echocardiogram pending  - c/w ASA, as per cardio patient is not a candidate for AC due to falls    Metabolic encephalopathy  - likely from CHF and A. fib  - CT showed involutional and ischemic gliotic changes w/ no acute intracranial hemorrhage    Acute on chronic HF  - CXR showed congestive changes   - Echo pending   - c/w Lasix   - will add ACE once OTTO resolves    OTTO on CKD II/III  - watch Cr on diuretics   - will consult renal if Cr worsens    Hx of Gastric ulcer  - c/w Protonix     Acquired hypothyroidism  - c/w synthroid    DM II  - c/w ISS  - A1C is 7.3    HLD  - c/w Zocor    Essential hypertension  - c/w Cardizem & lisinopril    Bacteriuria   - patient started on Rocephin, doubt UTI but patient has metabolic encephalopathy and is not a good historian - not clear if polyuria was present before Lasix was started, so will c/w a short course of empiric Rocephin      Hypokalemia  - replete w/ KCl    Prophylaxis:  DVT: Heparin  GI: Protonix    Spoke w/ pt's daughter Virginia at bedside. She says the patient lives alone and wants to be independent. Although she notes that the patient appears to be developing dementia she reports that the pt's mental status is usually better. She would like to have the aid's hours increased & doesn't want the patient to be placed in a facility. When asked about the patient's code status, she said she will need to talk to her brother.

## 2019-10-09 LAB
ANION GAP SERPL CALC-SCNC: 10 MMOL/L — SIGNIFICANT CHANGE UP (ref 5–17)
BUN SERPL-MCNC: 27 MG/DL — HIGH (ref 7–23)
CALCIUM SERPL-MCNC: 8.6 MG/DL — SIGNIFICANT CHANGE UP (ref 8.5–10.1)
CHLORIDE SERPL-SCNC: 98 MMOL/L — SIGNIFICANT CHANGE UP (ref 96–108)
CO2 SERPL-SCNC: 26 MMOL/L — SIGNIFICANT CHANGE UP (ref 22–31)
CREAT SERPL-MCNC: 1.44 MG/DL — HIGH (ref 0.5–1.3)
GLUCOSE BLDC GLUCOMTR-MCNC: 119 MG/DL — HIGH (ref 70–99)
GLUCOSE BLDC GLUCOMTR-MCNC: 171 MG/DL — HIGH (ref 70–99)
GLUCOSE BLDC GLUCOMTR-MCNC: 198 MG/DL — HIGH (ref 70–99)
GLUCOSE SERPL-MCNC: 183 MG/DL — HIGH (ref 70–99)
HCT VFR BLD CALC: 39.3 % — SIGNIFICANT CHANGE UP (ref 34.5–45)
HGB BLD-MCNC: 12.8 G/DL — SIGNIFICANT CHANGE UP (ref 11.5–15.5)
MAGNESIUM SERPL-MCNC: 2.2 MG/DL — SIGNIFICANT CHANGE UP (ref 1.6–2.6)
MCHC RBC-ENTMCNC: 30.4 PG — SIGNIFICANT CHANGE UP (ref 27–34)
MCHC RBC-ENTMCNC: 32.6 GM/DL — SIGNIFICANT CHANGE UP (ref 32–36)
MCV RBC AUTO: 93.3 FL — SIGNIFICANT CHANGE UP (ref 80–100)
NRBC # BLD: 0 /100 WBCS — SIGNIFICANT CHANGE UP (ref 0–0)
PHOSPHATE SERPL-MCNC: 3.6 MG/DL — SIGNIFICANT CHANGE UP (ref 2.5–4.5)
PLATELET # BLD AUTO: 143 K/UL — LOW (ref 150–400)
POTASSIUM SERPL-MCNC: 3.7 MMOL/L — SIGNIFICANT CHANGE UP (ref 3.5–5.3)
POTASSIUM SERPL-SCNC: 3.7 MMOL/L — SIGNIFICANT CHANGE UP (ref 3.5–5.3)
RBC # BLD: 4.21 M/UL — SIGNIFICANT CHANGE UP (ref 3.8–5.2)
RBC # FLD: 14.4 % — SIGNIFICANT CHANGE UP (ref 10.3–14.5)
SODIUM SERPL-SCNC: 134 MMOL/L — LOW (ref 135–145)
WBC # BLD: 9.06 K/UL — SIGNIFICANT CHANGE UP (ref 3.8–10.5)
WBC # FLD AUTO: 9.06 K/UL — SIGNIFICANT CHANGE UP (ref 3.8–10.5)

## 2019-10-09 PROCEDURE — 99232 SBSQ HOSP IP/OBS MODERATE 35: CPT

## 2019-10-09 PROCEDURE — 99233 SBSQ HOSP IP/OBS HIGH 50: CPT

## 2019-10-09 RX ADMIN — Medication 1: at 17:13

## 2019-10-09 RX ADMIN — CEFTRIAXONE 100 MILLIGRAM(S): 500 INJECTION, POWDER, FOR SOLUTION INTRAMUSCULAR; INTRAVENOUS at 21:26

## 2019-10-09 RX ADMIN — HEPARIN SODIUM 5000 UNIT(S): 5000 INJECTION INTRAVENOUS; SUBCUTANEOUS at 17:13

## 2019-10-09 RX ADMIN — Medication 60 MILLIGRAM(S): at 21:27

## 2019-10-09 RX ADMIN — Medication 2: at 08:15

## 2019-10-09 RX ADMIN — SIMVASTATIN 10 MILLIGRAM(S): 20 TABLET, FILM COATED ORAL at 21:26

## 2019-10-09 RX ADMIN — Medication 60 MILLIGRAM(S): at 06:00

## 2019-10-09 RX ADMIN — LISINOPRIL 10 MILLIGRAM(S): 2.5 TABLET ORAL at 06:01

## 2019-10-09 RX ADMIN — Medication 40 MILLIGRAM(S): at 06:00

## 2019-10-09 RX ADMIN — Medication 81 MILLIGRAM(S): at 12:49

## 2019-10-09 RX ADMIN — PANTOPRAZOLE SODIUM 40 MILLIGRAM(S): 20 TABLET, DELAYED RELEASE ORAL at 06:01

## 2019-10-09 RX ADMIN — Medication 1: at 12:17

## 2019-10-09 RX ADMIN — Medication 50 MICROGRAM(S): at 06:01

## 2019-10-09 RX ADMIN — Medication 60 MILLIGRAM(S): at 15:38

## 2019-10-09 RX ADMIN — HEPARIN SODIUM 5000 UNIT(S): 5000 INJECTION INTRAVENOUS; SUBCUTANEOUS at 06:01

## 2019-10-09 NOTE — PROGRESS NOTE ADULT - SUBJECTIVE AND OBJECTIVE BOX
Patient is a 89y old  Female who presents with a chief complaint of AMS/SOB (08 Oct 2019 15:29)       OVERNIGHT EVENTS: none    MEDICATIONS  (STANDING):  aspirin enteric coated 81 milliGRAM(s) Oral daily  cefTRIAXone   IVPB 1000 milliGRAM(s) IV Intermittent every 24 hours  dextrose 5%. 1000 milliLiter(s) (50 mL/Hr) IV Continuous <Continuous>  dextrose 50% Injectable 12.5 Gram(s) IV Push once  dextrose 50% Injectable 25 Gram(s) IV Push once  dextrose 50% Injectable 25 Gram(s) IV Push once  diltiazem    Tablet 60 milliGRAM(s) Oral every 8 hours  furosemide   Injectable 40 milliGRAM(s) IV Push daily  heparin  Injectable 5000 Unit(s) SubCutaneous every 12 hours  influenza   Vaccine 0.5 milliLiter(s) IntraMuscular once  insulin lispro (HumaLOG) corrective regimen sliding scale   SubCutaneous three times a day before meals  insulin lispro (HumaLOG) corrective regimen sliding scale   SubCutaneous at bedtime  levothyroxine 50 MICROGram(s) Oral daily  lisinopril 10 milliGRAM(s) Oral daily  pantoprazole    Tablet 40 milliGRAM(s) Oral before breakfast  simvastatin 10 milliGRAM(s) Oral at bedtime    MEDICATIONS  (PRN):  acetaminophen   Tablet .. 650 milliGRAM(s) Oral every 6 hours PRN Temp greater or equal to 38C (100.4F), Mild Pain (1 - 3)  dextrose 40% Gel 15 Gram(s) Oral once PRN Blood Glucose LESS THAN 70 milliGRAM(s)/deciliter  docusate sodium 100 milliGRAM(s) Oral two times a day PRN Constipation  glucagon  Injectable 1 milliGRAM(s) IntraMuscular once PRN Glucose LESS THAN 70 milligrams/deciliter        REVIEW OF SYSTEMS:  Unable to obtain, patient is confused     Vital Signs Last 24 Hrs  T(C): 36.2 (09 Oct 2019 12:30), Max: 36.8 (09 Oct 2019 00:10)  T(F): 97.2 (09 Oct 2019 12:30), Max: 98.2 (09 Oct 2019 00:10)  HR: 85 (09 Oct 2019 12:30) (65 - 109)  BP: 119/65 (09 Oct 2019 12:30) (119/65 - 150/75)  BP(mean): --  RR: 18 (09 Oct 2019 12:30) (17 - 18)  SpO2: 98% (09 Oct 2019 12:30) (97% - 100%)    PHYSICAL EXAM:  GENERAL: NAD, well-groomed, well-developed  HEAD:  Atraumatic, Normocephalic  EYES: EOMI, PERRLA   NECK: Supple   NERVOUS SYSTEM: Alert, awake, in NAD.   CHEST/LUNG: Clear to auscultation bilaterally; No rales, rhonchi, wheezing, or rubs  HEART: Regular rate and rhythm; No murmurs, rubs, or gallops  ABDOMEN: Soft, Nontender, Nondistended; Bowel sounds present  EXTREMITIES:   No clubbing, cyanosis, or edema    LABS:                        12.8   9.06  )-----------( 143      ( 09 Oct 2019 06:17 )             39.3     10-09    134<L>  |  98  |  27<H>  ----------------------------<  183<H>  3.7   |  26  |  1.44<H>    Ca    8.6      09 Oct 2019 06:17  Phos  3.6     10-09  Mg     2.2     10-09         cardiac markers     CAPILLARY BLOOD GLUCOSE      POCT Blood Glucose.: 171 mg/dL (09 Oct 2019 12:09)  POCT Blood Glucose.: 224 mg/dL (09 Oct 2019 07:55)  POCT Blood Glucose.: 230 mg/dL (08 Oct 2019 21:06)  POCT Blood Glucose.: 130 mg/dL (08 Oct 2019 17:01)    Cultures    RADIOLOGY & ADDITIONAL TESTS:    Imaging Personally Reviewed:  [ ] YES  [ ] NO    Consultant(s) Notes Reviewed:  [ x ] YES  [ ] NO    Care Discussed with Consultants/Other Providers [ ] YES  [ ] NO

## 2019-10-09 NOTE — PROGRESS NOTE ADULT - ASSESSMENT
89 yo female with PMHx of HTN, DM2, HLD, Hypothyroidism,  paroxysmal AF - previously mentioned in hospitalization in 2018, p/w increasing confusion/SOB  found to have afib with RVR.  Trop neg x1, BNP ~91743  Chest X-ray: Congestive changes   Echo from 2018 with normal EF    ·	Atrial fibrillation with RVR  ·	Acute on chronic HF,  echo pending   ·	Essential HTN  ·	HLD  ·	DM1  ·	Hypothyroidism   ·	Hypokalemia    Plan  - continue with telemetry   -2D Echo Report pending  -Cardizem increased to 60 mg Q8 HR for HR control,  will increase dose as needed/ will consider Cardizem drip if needed for HR control or add metoprolol based on echo findings   -Not a candidate for AC due to debility and multiple fall history, continue low dose Asa - no h/o any acute bleeding history as per daughter   -Cont with IV Lasix 40 mg daily, monitor renal function/electrolytes/daily weight, supplement potassium   -Cont with lisinopril for BP control  -Diabetic management as per primary team    -Cont statin  -Cont with synthroid, TSH 2.960    -Activity as tolerated

## 2019-10-09 NOTE — PROGRESS NOTE ADULT - ASSESSMENT
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    Atrial fibrillation with RVR - improved  - c/w Cardizem  - TSH is 2.96  - Echocardiogram pending  - c/w ASA, as per cardio patient is not a candidate for AC due to falls  - Cardio is following.    Metabolic encephalopathy  - likely from CHF and A. fib  - CT showed involutional and ischemic gliotic changes w/ no acute intracranial hemorrhage    Acute on chronic HF  - CXR showed congestive changes   - Echo is done, result is not available yet.  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    Prophylaxis:  DVT: Heparin  GI: Protonix    Follow up patient.

## 2019-10-09 NOTE — PROGRESS NOTE ADULT - SUBJECTIVE AND OBJECTIVE BOX
Patient is a 89y old  Female who presents with a chief complaint of AMS/SOB (08 Oct 2019 15:29)    PAST MEDICAL & SURGICAL HISTORY:  History of cholecystectomy  H/O abdominal hysterectomy  Gastric ulcer  Hypothyroid  DM (diabetes mellitus) type II controlled peripheral vascular disorder  Hyperlipidemia  Hypertension  H/O shoulder surgery: left side    INTERVAL HISTORY: patient in bed, comfortable, denies chest pain, SOB, palpitations or cough.  	  MEDICATIONS:  MEDICATIONS  (STANDING):  aspirin enteric coated 81 milliGRAM(s) Oral daily  cefTRIAXone   IVPB 1000 milliGRAM(s) IV Intermittent every 24 hours  dextrose 5%. 1000 milliLiter(s) (50 mL/Hr) IV Continuous <Continuous>  dextrose 50% Injectable 12.5 Gram(s) IV Push once  dextrose 50% Injectable 25 Gram(s) IV Push once  dextrose 50% Injectable 25 Gram(s) IV Push once  diltiazem    Tablet 60 milliGRAM(s) Oral every 8 hours  furosemide   Injectable 40 milliGRAM(s) IV Push daily  heparin  Injectable 5000 Unit(s) SubCutaneous every 12 hours  influenza   Vaccine 0.5 milliLiter(s) IntraMuscular once  insulin lispro (HumaLOG) corrective regimen sliding scale   SubCutaneous three times a day before meals  insulin lispro (HumaLOG) corrective regimen sliding scale   SubCutaneous at bedtime  levothyroxine 50 MICROGram(s) Oral daily  lisinopril 10 milliGRAM(s) Oral daily  pantoprazole    Tablet 40 milliGRAM(s) Oral before breakfast  simvastatin 10 milliGRAM(s) Oral at bedtime    MEDICATIONS  (PRN):  acetaminophen   Tablet .. 650 milliGRAM(s) Oral every 6 hours PRN Temp greater or equal to 38C (100.4F), Mild Pain (1 - 3)  dextrose 40% Gel 15 Gram(s) Oral once PRN Blood Glucose LESS THAN 70 milliGRAM(s)/deciliter  docusate sodium 100 milliGRAM(s) Oral two times a day PRN Constipation  glucagon  Injectable 1 milliGRAM(s) IntraMuscular once PRN Glucose LESS THAN 70 milligrams/deciliter    Vitals:  T(F): 97.5 (10-09-19 @ 16:35), Max: 98.2 (10-09-19 @ 00:10)  HR: 87 (10-09-19 @ 16:35) (65 - 109)  BP: 119/64 (10-09-19 @ 16:35) (119/64 - 150/75)  RR: 17 (10-09-19 @ 16:35) (17 - 18)  SpO2: 98% (10-09-19 @ 16:35) (97% - 100%)  Wt(kg): --49.7 kg    10-08 @ 07:01  -  10-09 @ 07:00  --------------------------------------------------------  IN:    Oral Fluid: 120 mL    Solution: 50 mL  Total IN: 170 mL    OUT:    Voided: 200 mL  Total OUT: 200 mL    Total NET: -30 mL      10-09 @ 07:01  -  10-09 @ 17:01  --------------------------------------------------------  IN:    Oral Fluid: 240 mL  Total IN: 240 mL    OUT:  Total OUT: 0 mL    Total NET: 240 mL    Weight (kg): 50.9 (10-07 @ 20:46)  BMI (kg/m2): 18.7 (10-07 @ 20:46)    PHYSICAL EXAM:  Neuro: Awake, responsive  CV: S1 S2 irregular  Lungs: CTABL  GI: Soft, BS +, ND, NT  Extremities: No edema    TELEMETRY: Afib 80  	    RADIOLOGY: < from: Xray Chest 1 View- PORTABLE-Routine (10.07.19 @ 09:07) >  INTERPRETATION:  Chest one view    HISTORY: Shortness of breath    COMPARISON STUDY: 11/11/2018    Frontal expiratory view of the chest shows the heart to be enlarged in   size. The lungs show mild basilar congestion with small left effusion and   there is no evidence of pneumothorax nor definite right pleural effusion.   Left shoulder prosthesis again noted.    IMPRESSION:  Congestive changes asnoted.       < end of copied text >      DIAGNOSTIC TESTING:    [x ] Echocardiogram: Pending     LABS:	 	    CARDIAC MARKERS:  Troponin I, Serum: <.015 ng/mL (10-07 @ 10:19)    09 Oct 2019 06:17    134    |  98     |  27     ----------------------------<  183    3.7     |  26     |  1.44   08 Oct 2019 10:33    139    |  101    |  27     ----------------------------<  111    3.3     |  28     |  1.48   07 Oct 2019 10:19    140    |  103    |  25     ----------------------------<  203    3.8     |  26     |  1.54     Ca    8.6        09 Oct 2019 06:17  Phos  3.6       09 Oct 2019 06:17  Mg     2.2       09 Oct 2019 06:17                          12.8   9.06  )-----------( 143      ( 09 Oct 2019 06:17 )             39.3 ,                       13.1   8.86  )-----------( 156      ( 07 Oct 2019 09:04 )             40.9   proBNP: Serum Pro-Brain Natriuretic Peptide: 17616 pg/mL (10-07 @ 10:19)    Lipid Profile:   HgA1c: Hemoglobin A1C, Whole Blood: 7.3 % (10-08 @ 09:09)    TSH: Thyroid Stimulating Hormone, Serum: 2.960 uU/mL (10-07 @ 10:19)    INR: 1.34 ratio (10-07 @ 10:19)

## 2019-10-10 LAB
ANION GAP SERPL CALC-SCNC: 9 MMOL/L — SIGNIFICANT CHANGE UP (ref 5–17)
BUN SERPL-MCNC: 35 MG/DL — HIGH (ref 7–23)
CALCIUM SERPL-MCNC: 9.5 MG/DL — SIGNIFICANT CHANGE UP (ref 8.5–10.1)
CHLORIDE SERPL-SCNC: 97 MMOL/L — SIGNIFICANT CHANGE UP (ref 96–108)
CO2 SERPL-SCNC: 30 MMOL/L — SIGNIFICANT CHANGE UP (ref 22–31)
CREAT SERPL-MCNC: 1.57 MG/DL — HIGH (ref 0.5–1.3)
GLUCOSE BLDC GLUCOMTR-MCNC: 185 MG/DL — HIGH (ref 70–99)
GLUCOSE BLDC GLUCOMTR-MCNC: 186 MG/DL — HIGH (ref 70–99)
GLUCOSE BLDC GLUCOMTR-MCNC: 190 MG/DL — HIGH (ref 70–99)
GLUCOSE BLDC GLUCOMTR-MCNC: 191 MG/DL — HIGH (ref 70–99)
GLUCOSE BLDC GLUCOMTR-MCNC: 239 MG/DL — HIGH (ref 70–99)
GLUCOSE SERPL-MCNC: 193 MG/DL — HIGH (ref 70–99)
POTASSIUM SERPL-MCNC: 3.3 MMOL/L — LOW (ref 3.5–5.3)
POTASSIUM SERPL-SCNC: 3.3 MMOL/L — LOW (ref 3.5–5.3)
SODIUM SERPL-SCNC: 136 MMOL/L — SIGNIFICANT CHANGE UP (ref 135–145)

## 2019-10-10 PROCEDURE — 99232 SBSQ HOSP IP/OBS MODERATE 35: CPT

## 2019-10-10 RX ORDER — POTASSIUM CHLORIDE 20 MEQ
20 PACKET (EA) ORAL ONCE
Refills: 0 | Status: COMPLETED | OUTPATIENT
Start: 2019-10-10 | End: 2019-10-10

## 2019-10-10 RX ORDER — FUROSEMIDE 40 MG
20 TABLET ORAL DAILY
Refills: 0 | Status: DISCONTINUED | OUTPATIENT
Start: 2019-10-10 | End: 2019-10-10

## 2019-10-10 RX ORDER — FUROSEMIDE 40 MG
40 TABLET ORAL DAILY
Refills: 0 | Status: DISCONTINUED | OUTPATIENT
Start: 2019-10-11 | End: 2019-10-11

## 2019-10-10 RX ADMIN — Medication 50 MICROGRAM(S): at 05:16

## 2019-10-10 RX ADMIN — SIMVASTATIN 10 MILLIGRAM(S): 20 TABLET, FILM COATED ORAL at 21:28

## 2019-10-10 RX ADMIN — HEPARIN SODIUM 5000 UNIT(S): 5000 INJECTION INTRAVENOUS; SUBCUTANEOUS at 05:16

## 2019-10-10 RX ADMIN — Medication 2: at 11:42

## 2019-10-10 RX ADMIN — PANTOPRAZOLE SODIUM 40 MILLIGRAM(S): 20 TABLET, DELAYED RELEASE ORAL at 05:17

## 2019-10-10 RX ADMIN — Medication 1: at 17:01

## 2019-10-10 RX ADMIN — Medication 60 MILLIGRAM(S): at 13:20

## 2019-10-10 RX ADMIN — Medication 60 MILLIGRAM(S): at 05:17

## 2019-10-10 RX ADMIN — Medication 20 MILLIEQUIVALENT(S): at 17:02

## 2019-10-10 RX ADMIN — Medication 81 MILLIGRAM(S): at 11:42

## 2019-10-10 RX ADMIN — Medication 1: at 08:10

## 2019-10-10 RX ADMIN — LISINOPRIL 10 MILLIGRAM(S): 2.5 TABLET ORAL at 05:17

## 2019-10-10 RX ADMIN — Medication 40 MILLIGRAM(S): at 05:15

## 2019-10-10 RX ADMIN — Medication 60 MILLIGRAM(S): at 21:27

## 2019-10-10 RX ADMIN — HEPARIN SODIUM 5000 UNIT(S): 5000 INJECTION INTRAVENOUS; SUBCUTANEOUS at 17:01

## 2019-10-10 NOTE — PHYSICAL THERAPY INITIAL EVALUATION ADULT - RANGE OF MOTION EXAMINATION, REHAB EVAL
except both shoulders limited due to pain nad contractures./bilateral lower extremity was ROM was WNL (within normal limits)/bilateral upper extremity ROM was WNL (within normal limits)/deficits as listed below

## 2019-10-10 NOTE — PROGRESS NOTE ADULT - ASSESSMENT
89 yo F with history of Hypertension, Gastric ulcer, Hypothyroidism, PAF, HLD and DM II presented with Atrial fibrillation w/ RVR and metabolic encephalopathy, Acute on chronic HF & OTTO on CKD II/III.    Atrial fibrillation with RVR - improved  - c/w Cardizem  - TSH is 2.96  - Echocardiogram - with combined systocic and diastoic heart failure  - c/w ASA, as per cardio patient is not a candidate for AC due to falls  - Cardio is following.    Metabolic encephalopathy  - likely from CHF and A. 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    Prophylaxis:  DVT: Heparin  GI: Protonix    Discharge once cleared by cardiology

## 2019-10-10 NOTE — CHART NOTE - NSCHARTNOTEFT_GEN_A_CORE
Upon Nutritional Assessment by the Registered Dietitian your patient was determined to meet criteria / has evidence of the following diagnosis/diagnoses:          [ ]  Mild Protein Calorie Malnutrition        [x ]  Moderate Protein Calorie Malnutrition        [ ] Severe Protein Calorie Malnutrition        [ ] Unspecified Protein Calorie Malnutrition        [ ] Underweight / BMI <19        [ ] Morbid Obesity / BMI > 40      Findings as based on:  •  Comprehensive nutrition assessment and consultation  •  Calorie counts (nutrient intake analysis)  •  Food acceptance and intake status from observations by staff  •  Follow up  •  Patient education  •  Intervention secondary to interdisciplinary rounds  •   concerns      Treatment:    The following diet has been recommended: CCHO w/ evening snack ; Dash/TLC w/ Glucerna Shake 8 oz 3 times per day (660 giuliana, 30 gm pro)       PROVIDER Section:     By signing this assessment you are acknowledging and agree with the diagnosis/diagnoses assigned by the Registered Dietitian    Comments: Upon Nutritional Assessment by the Registered Dietitian your patient was determined to meet criteria / has evidence of the following diagnosis/diagnoses:          [ ]  Mild Protein Calorie Malnutrition        [x ]  Moderate Protein Calorie Malnutrition        [ ] Severe Protein Calorie Malnutrition        [ ] Unspecified Protein Calorie Malnutrition        [x ] Underweight / BMI <19        [ ] Morbid Obesity / BMI > 40      Findings as based on:  •  Comprehensive nutrition assessment and consultation  •  Calorie counts (nutrient intake analysis)  •  Food acceptance and intake status from observations by staff  •  Follow up  •  Patient education  •  Intervention secondary to interdisciplinary rounds  •   concerns      Treatment:    The following diet has been recommended: CCHO w/ evening snack ; Dash/TLC w/ Glucerna Shake 8 oz 3 times per day (660 giuliana, 30 gm pro)       PROVIDER Section:     By signing this assessment you are acknowledging and agree with the diagnosis/diagnoses assigned by the Registered Dietitian    Comments:

## 2019-10-10 NOTE — PROGRESS NOTE ADULT - SUBJECTIVE AND OBJECTIVE BOX
Patient is a 89y old  Female who presents with a chief complaint of SOB (09 Oct 2019 17:01)    PAST MEDICAL & SURGICAL HISTORY:  History of cholecystectomy  H/O abdominal hysterectomy  Gastric ulcer  Hypothyroid  DM (diabetes mellitus) type II controlled peripheral vascular disorder  Hyperlipidemia  Hypertension  H/O shoulder surgery: left side    INTERVAL HISTORY:  	  MEDICATIONS:  MEDICATIONS  (STANDING):  aspirin enteric coated 81 milliGRAM(s) Oral daily  cefTRIAXone   IVPB 1000 milliGRAM(s) IV Intermittent every 24 hours  dextrose 5%. 1000 milliLiter(s) (50 mL/Hr) IV Continuous <Continuous>  dextrose 50% Injectable 12.5 Gram(s) IV Push once  dextrose 50% Injectable 25 Gram(s) IV Push once  dextrose 50% Injectable 25 Gram(s) IV Push once  diltiazem    Tablet 60 milliGRAM(s) Oral every 8 hours  heparin  Injectable 5000 Unit(s) SubCutaneous every 12 hours  influenza   Vaccine 0.5 milliLiter(s) IntraMuscular once  insulin lispro (HumaLOG) corrective regimen sliding scale   SubCutaneous three times a day before meals  insulin lispro (HumaLOG) corrective regimen sliding scale   SubCutaneous at bedtime  levothyroxine 50 MICROGram(s) Oral daily  lisinopril 10 milliGRAM(s) Oral daily  pantoprazole    Tablet 40 milliGRAM(s) Oral before breakfast  simvastatin 10 milliGRAM(s) Oral at bedtime    MEDICATIONS  (PRN):  acetaminophen   Tablet .. 650 milliGRAM(s) Oral every 6 hours PRN Temp greater or equal to 38C (100.4F), Mild Pain (1 - 3)  dextrose 40% Gel 15 Gram(s) Oral once PRN Blood Glucose LESS THAN 70 milliGRAM(s)/deciliter  docusate sodium 100 milliGRAM(s) Oral two times a day PRN Constipation  glucagon  Injectable 1 milliGRAM(s) IntraMuscular once PRN Glucose LESS THAN 70 milligrams/deciliter      Vitals:  T(F): 97.2 (10-10-19 @ 05:04), Max: 97.5 (10-09-19 @ 16:35)  HR: 93 (10-10-19 @ 10:32) (85 - 93)  BP: 133/69 (10-10-19 @ 10:32) (119/64 - 141/65)  RR: 18 (10-10-19 @ 05:04) (16 - 18)  SpO2: 96% (10-10-19 @ 10:32) (96% - 98%)  Wt(kg): --51.2 kg    10-09 @ 07:01  -  10-10 @ 07:00  --------------------------------------------------------  IN:    Oral Fluid: 240 mL    Solution: 50 mL  Total IN: 290 mL    OUT:  Total OUT: 0 mL    Total NET: 290 mL      10-10 @ 07:01  -  10-10 @ 11:40  --------------------------------------------------------  IN:    Oral Fluid: 120 mL  Total IN: 120 mL    OUT:  Total OUT: 0 mL    Total NET: 120 mL    Weight (kg): 50.9 (10-07 @ 20:46)    PHYSICAL EXAM:  Neuro: Awake, responsive  CV: S1 S2 RRR  Lungs: CTABL  GI: Soft, BS +, ND, NT  Extremities: No edema    TELEMETRY: AFIB 80s  	    ECG:  	    RADIOLOGY: < from: Xray Chest 1 View- PORTABLE-Routine (10.07.19 @ 09:07) >  INTERPRETATION:  Chest one view    HISTORY: Shortness of breath    COMPARISON STUDY: 11/11/2018    Frontal expiratory view of the chest shows the heart to be enlarged in   size. The lungs show mild basilar congestion with small left effusion and   there is no evidence of pneumothorax nor definite right pleural effusion.   Left shoulder prosthesis again noted.    IMPRESSION:  Congestive changes asnoted.       < end of copied text >      DIAGNOSTIC TESTING:    [x ] Echocardiogram:   < from: TTE Echo Doppler w/o Cont (10.09.19 @ 11:07) >  40%.   2. Severely decreased global left ventricular systolic function.   3. Basal and mid anterior wall, basal and mid inferior septum, and basal   inferolateral segment are abnormal as described above.   4. Normal left ventricular internal cavity size.   5. Spectral Doppler shows restrictive pattern of left ventricular   myocardial filling (Grade III diastolic dysfunction).   6. Normal right ventricular size and function.   7. The left atrium is normal in size.   8. The right atrium is normal in size.   9. Small pericardial effusion.  10. Mild mitral annular calcification.  11. Mild mitral valve regurgitation.  12. Thickening of the anterior and posterior mitral valve leaflets.  13. Structurally normal tricuspid valve, with normal leaflet excursion.  14. Mild aortic regurgitation.  15. Structurally normal pulmonic valve, with normal leaflet excursion.    < end of copied text >  LABS:	 	    10 Oct 2019 08:17    136    |  97     |  35     ----------------------------<  193    3.3     |  30     |  1.57   09 Oct 2019 06:17    134    |  98     |  27     ----------------------------<  183    3.7     |  26     |  1.44   08 Oct 2019 10:33    139    |  101    |  27     ----------------------------<  111    3.3     |  28     |  1.48     Ca    9.5        10 Oct 2019 08:17  Phos  3.6       09 Oct 2019 06:17  Mg     2.2       09 Oct 2019 06:17                            12.8   9.06  )-----------( 143      ( 09 Oct 2019 06:17 )             39.3      HgA1c: Hemoglobin A1C, Whole Blood: 7.3 % (10-08 @ 09:09)    TSH: Thyroid Stimulating Hormone, Serum: 2.960 uU/mL (10-07 @ 10:19)

## 2019-10-10 NOTE — DIETITIAN INITIAL EVALUATION ADULT. - DIET TYPE
10/7 - CCHO w/ evening snack ; Glucerna Shake 8 twice per day (440 giuliana, 20 gm pro) supplement (specify)/Glucerna Shake 8 oz 3 times per day (660 giuliana, 30 gm pro)/consistent carbohydrate (evening snack)

## 2019-10-10 NOTE — DIETITIAN INITIAL EVALUATION ADULT. - PERTINENT LABORATORY DATA
10-10 Na136 mmol/L Glu 193 mg/dL<H> K+ 3.3 mmol/L<L> Cr  1.57 mg/dL<H> BUN 35 mg/dL<H> 10-09 Phos 3.6 mg/dL 10-07 Alb 3.6 g/dL 10-08 [id number] 7.3 %<H>10-07 ALT 16 U/L AST 14 U/L<L> Alkaline Phosphatase 77 U/L

## 2019-10-10 NOTE — PHYSICAL THERAPY INITIAL EVALUATION ADULT - CRITERIA FOR SKILLED THERAPEUTIC INTERVENTIONS
predicted duration of therapy intervention/impairments found/risk reduction/prevention/therapy frequency/functional limitations in following categories/rehab potential

## 2019-10-10 NOTE — PHYSICAL THERAPY INITIAL EVALUATION ADULT - GENERAL OBSERVATIONS, REHAB EVAL
Chart (EMR) reviewed. Received supine c HOB elevated, NAD. +cardiac monitor donned. Alert. Ox2. Periods of confusion. Able to follow simple commands/directions.

## 2019-10-10 NOTE — PROGRESS NOTE ADULT - ASSESSMENT
89 yo female with PMHx of HTN, DM2, HLD, Hypothyroidism,  paroxysmal AF - previously mentioned in hospitalization in 2018, p/w increasing confusion/SOB  found to have afib with RVR.  Trop neg x1, BNP ~59785  Chest X-ray: Congestive changes   Echo from 2018 with normal EF    ·	Atrial fibrillation with RVR  ·	Acute on chronic HF,  echo pending   ·	Essential HTN  ·	HLD  ·	DM1  ·	Hypothyroidism   ·	Hypokalemia    Plan  - continue with telemetry   -2D Echo Report EF 35-40% Gr III DD  -Cardizem increased to 60 mg Q8 HR for HR control,  will increase dose as needed   -Not a candidate for AC due to debility and multiple fall history, continue low dose Asa - no h/o any acute bleeding history as per daughter   -IV Lasix changed to lasix 40 mg PO daily, monitor renal function/electrolytes/daily weight, supplement potassium   -Cont with lisinopril for BP control  -Diabetic management as per primary team    -Cont statin  -Cont with synthroid, TSH 2.960    -Activity as tolerated

## 2019-10-10 NOTE — DIETITIAN INITIAL EVALUATION ADULT. - PHYSICAL APPEARANCE
other (specify) Nutrition focused physical exam conducted ;   Subcutaneous fat loss: [mild ] Orbital fat pads region, [mild ]Buccal fat region, [severe ]Triceps region,  [unable ]Ribs region.  Muscle wasting: [mild ]Temples region, [ mild]Clavicle region, [mild ]Shoulder region, [mild ]Scapula region, [mild ]Interosseous region,  [severe ]thigh region, [moderate ]Calf region

## 2019-10-10 NOTE — PHYSICAL THERAPY INITIAL EVALUATION ADULT - IMPAIRMENTS FOUND, PT EVAL
ROM/gait, locomotion, and balance/posture/muscle strength/poor safety awareness/aerobic capacity/endurance

## 2019-10-10 NOTE — DIETITIAN INITIAL EVALUATION ADULT. - PERTINENT MEDS FT
MEDICATIONS  (STANDING):  aspirin enteric coated 81 milliGRAM(s) Oral daily  cefTRIAXone   IVPB 1000 milliGRAM(s) IV Intermittent every 24 hours  dextrose 5%. 1000 milliLiter(s) (50 mL/Hr) IV Continuous <Continuous>  dextrose 50% Injectable 12.5 Gram(s) IV Push once  dextrose 50% Injectable 25 Gram(s) IV Push once  dextrose 50% Injectable 25 Gram(s) IV Push once  diltiazem    Tablet 60 milliGRAM(s) Oral every 8 hours  furosemide   Injectable 40 milliGRAM(s) IV Push daily  heparin  Injectable 5000 Unit(s) SubCutaneous every 12 hours  influenza   Vaccine 0.5 milliLiter(s) IntraMuscular once  insulin lispro (HumaLOG) corrective regimen sliding scale   SubCutaneous three times a day before meals  insulin lispro (HumaLOG) corrective regimen sliding scale   SubCutaneous at bedtime  levothyroxine 50 MICROGram(s) Oral daily  lisinopril 10 milliGRAM(s) Oral daily  pantoprazole    Tablet 40 milliGRAM(s) Oral before breakfast  simvastatin 10 milliGRAM(s) Oral at bedtime    MEDICATIONS  (PRN):  acetaminophen   Tablet .. 650 milliGRAM(s) Oral every 6 hours PRN Temp greater or equal to 38C (100.4F), Mild Pain (1 - 3)  dextrose 40% Gel 15 Gram(s) Oral once PRN Blood Glucose LESS THAN 70 milliGRAM(s)/deciliter  docusate sodium 100 milliGRAM(s) Oral two times a day PRN Constipation  glucagon  Injectable 1 milliGRAM(s) IntraMuscular once PRN Glucose LESS THAN 70 milligrams/deciliter

## 2019-10-10 NOTE — DIETITIAN INITIAL EVALUATION ADULT. - OTHER INFO
Pt lives home alone. Her daughter (not currently at bedside) does the food shopping/cooking. Pt does not know her usual weight and confused when asked about her diet at home. Pt without food allergies. 1/8 HgBA1c = 7.3 %. Pt w/ partial upper dentures. Denies difficulty chewing/swallowing/N/V/D. Consumed 100% breakfast meal and 100% Glucerna Shake 8 oz daily (220 giuliana, 10 gm pro) at bedside. Pt lives home alone. Her daughter (not currently at bedside) does the food shopping/cooking. Pt does not know her usual weight and confused when asked about her diet at home. Pt without food allergies. 10/8 HgBA1c = 7.3 %. Pt w/ partial upper dentures. Denies difficulty chewing/swallowing/N/V/D. Consumed 100% breakfast meal and 100% Glucerna Shake 8 oz daily (220 giuliana, 10 gm pro) at bedside.

## 2019-10-10 NOTE — PROGRESS NOTE ADULT - SUBJECTIVE AND OBJECTIVE BOX
Patient is a 89y old  Female who presents with a chief complaint of SOB (10 Oct 2019 11:39), has no pain       OVERNIGHT EVENTS: none    MEDICATIONS  (STANDING):  aspirin enteric coated 81 milliGRAM(s) Oral daily  cefTRIAXone   IVPB 1000 milliGRAM(s) IV Intermittent every 24 hours  dextrose 5%. 1000 milliLiter(s) (50 mL/Hr) IV Continuous <Continuous>  dextrose 50% Injectable 12.5 Gram(s) IV Push once  dextrose 50% Injectable 25 Gram(s) IV Push once  dextrose 50% Injectable 25 Gram(s) IV Push once  diltiazem    Tablet 60 milliGRAM(s) Oral every 8 hours  heparin  Injectable 5000 Unit(s) SubCutaneous every 12 hours  influenza   Vaccine 0.5 milliLiter(s) IntraMuscular once  insulin lispro (HumaLOG) corrective regimen sliding scale   SubCutaneous three times a day before meals  insulin lispro (HumaLOG) corrective regimen sliding scale   SubCutaneous at bedtime  levothyroxine 50 MICROGram(s) Oral daily  lisinopril 10 milliGRAM(s) Oral daily  pantoprazole    Tablet 40 milliGRAM(s) Oral before breakfast  simvastatin 10 milliGRAM(s) Oral at bedtime    MEDICATIONS  (PRN):  acetaminophen   Tablet .. 650 milliGRAM(s) Oral every 6 hours PRN Temp greater or equal to 38C (100.4F), Mild Pain (1 - 3)  dextrose 40% Gel 15 Gram(s) Oral once PRN Blood Glucose LESS THAN 70 milliGRAM(s)/deciliter  docusate sodium 100 milliGRAM(s) Oral two times a day PRN Constipation  glucagon  Injectable 1 milliGRAM(s) IntraMuscular once PRN Glucose LESS THAN 70 milligrams/deciliter        REVIEW OF SYSTEMS:  CONSTITUTIONAL: No fever,    EYES: No eye pain,   ENMT:  No difficulty hearing,    NECK: No pain or stiffness  RESPIRATORY: No cough, wheezing,   CARDIOVASCULAR: No chest pain,    GASTROINTESTINAL: No abdominal or epigastric pain.   GENITOURINARY: No dysuria,    NEUROLOGICAL: No headaches,    SKIN: No itching,       Vital Signs Last 24 Hrs  T(C): 36 (10 Oct 2019 11:50), Max: 36.4 (09 Oct 2019 16:35)  T(F): 96.8 (10 Oct 2019 11:50), Max: 97.5 (09 Oct 2019 16:35)  HR: 84 (10 Oct 2019 11:50) (84 - 93)  BP: 123/55 (10 Oct 2019 11:50) (119/64 - 141/65)  BP(mean): --  RR: 18 (10 Oct 2019 11:50) (16 - 18)  SpO2: 96% (10 Oct 2019 11:50) (96% - 98%)    PHYSICAL EXAM:  GENERAL: NAD,    HEAD:  Atraumatic, Normocephalic  EYES: conjunctiva and sclera clear  ENMT: No tonsillar erythema,     NECK: Supple, No JVD   NERVOUS SYSTEM:  Alert & Oriented X3, Good concentration; Motor Strength 5/5 B/L upper and lower extremities;    CHEST/LUNG: Clear to auscultation  bilaterally; No rales, rhonchi, wheezing, or rubs  HEART: Regular rate and rhythm; No murmurs, rubs, or gallops  ABDOMEN: Soft, Nontender, Nondistended; Bowel sounds present  EXTREMITIES:  2+ Peripheral Pulses,   LYMPH: No lymphadenopathy noted  SKIN: No rashes or lesions    LABS:                        12.8   9.06  )-----------( 143      ( 09 Oct 2019 06:17 )             39.3     10-10    136  |  97  |  35<H>  ----------------------------<  193<H>  3.3<L>   |  30  |  1.57<H>    Ca    9.5      10 Oct 2019 08:17  Phos  3.6     10-09  Mg     2.2     10-09         cardiac markers     CAPILLARY BLOOD GLUCOSE      POCT Blood Glucose.: 239 mg/dL (10 Oct 2019 11:06)  POCT Blood Glucose.: 191 mg/dL (10 Oct 2019 07:43)  POCT Blood Glucose.: 119 mg/dL (09 Oct 2019 20:50)  POCT Blood Glucose.: 198 mg/dL (09 Oct 2019 16:24)    Cultures    RADIOLOGY & ADDITIONAL TESTS:    Imaging Personally Reviewed:  [ ] YES  [ ] NO    Consultant(s) Notes Reviewed:  [ ] YES  [ ] NO    Care Discussed with Consultants/Other Providers [ ] YES  [ ] NO

## 2019-10-10 NOTE — PHYSICAL THERAPY INITIAL EVALUATION ADULT - PERTINENT HX OF CURRENT PROBLEM, REHAB EVAL
Patient is a 89 y/o female admitted to [facility] due to AMS, SOB. CT of head negative acute findings.

## 2019-10-10 NOTE — PHYSICAL THERAPY INITIAL EVALUATION ADULT - TRANSFER SAFETY CONCERNS NOTED: SIT/STAND, REHAB EVAL
decreased balance during turns/decreased step length/decreased safety awareness/decreased sequencing ability

## 2019-10-10 NOTE — PHYSICAL THERAPY INITIAL EVALUATION ADULT - ADDITIONAL COMMENTS
Patient lives alone in an assisted living facility c no step to use. HAs [facility]HA 5 hrs/6 days /week. Independent c most ADL's except showering and laundry, and ambulation with straight cane.

## 2019-10-10 NOTE — PHYSICAL THERAPY INITIAL EVALUATION ADULT - ACTIVE RANGE OF MOTION EXAMINATION, REHAB EVAL
deficits as listed below/bilateral lower extremity Active ROM was WNL (within normal limits)/dajuan. upper extremity Active ROM was WNL (within normal limits)/except both shoulders limited due to pain nad contractures.

## 2019-10-11 ENCOUNTER — TRANSCRIPTION ENCOUNTER (OUTPATIENT)
Age: 84
End: 2019-10-11

## 2019-10-11 VITALS
HEART RATE: 85 BPM | TEMPERATURE: 97 F | RESPIRATION RATE: 18 BRPM | SYSTOLIC BLOOD PRESSURE: 121 MMHG | DIASTOLIC BLOOD PRESSURE: 62 MMHG | OXYGEN SATURATION: 96 %

## 2019-10-11 LAB
ANION GAP SERPL CALC-SCNC: 8 MMOL/L — SIGNIFICANT CHANGE UP (ref 5–17)
BUN SERPL-MCNC: 39 MG/DL — HIGH (ref 7–23)
CALCIUM SERPL-MCNC: 8.7 MG/DL — SIGNIFICANT CHANGE UP (ref 8.5–10.1)
CHLORIDE SERPL-SCNC: 97 MMOL/L — SIGNIFICANT CHANGE UP (ref 96–108)
CO2 SERPL-SCNC: 28 MMOL/L — SIGNIFICANT CHANGE UP (ref 22–31)
CREAT SERPL-MCNC: 1.43 MG/DL — HIGH (ref 0.5–1.3)
GLUCOSE BLDC GLUCOMTR-MCNC: 182 MG/DL — HIGH (ref 70–99)
GLUCOSE BLDC GLUCOMTR-MCNC: 305 MG/DL — HIGH (ref 70–99)
GLUCOSE SERPL-MCNC: 191 MG/DL — HIGH (ref 70–99)
POTASSIUM SERPL-MCNC: 4.1 MMOL/L — SIGNIFICANT CHANGE UP (ref 3.5–5.3)
POTASSIUM SERPL-SCNC: 4.1 MMOL/L — SIGNIFICANT CHANGE UP (ref 3.5–5.3)
SODIUM SERPL-SCNC: 133 MMOL/L — LOW (ref 135–145)

## 2019-10-11 PROCEDURE — 99239 HOSP IP/OBS DSCHRG MGMT >30: CPT

## 2019-10-11 RX ORDER — DILTIAZEM HCL 120 MG
1 CAPSULE, EXT RELEASE 24 HR ORAL
Qty: 90 | Refills: 0
Start: 2019-10-11 | End: 2019-11-09

## 2019-10-11 RX ORDER — ASPIRIN/CALCIUM CARB/MAGNESIUM 324 MG
1 TABLET ORAL
Qty: 0 | Refills: 0 | DISCHARGE
Start: 2019-10-11

## 2019-10-11 RX ORDER — FUROSEMIDE 40 MG
1 TABLET ORAL
Qty: 0 | Refills: 0 | DISCHARGE
Start: 2019-10-11

## 2019-10-11 RX ORDER — DOCUSATE SODIUM 100 MG
1 CAPSULE ORAL
Qty: 0 | Refills: 0 | DISCHARGE
Start: 2019-10-11

## 2019-10-11 RX ADMIN — LISINOPRIL 10 MILLIGRAM(S): 2.5 TABLET ORAL at 05:03

## 2019-10-11 RX ADMIN — HEPARIN SODIUM 5000 UNIT(S): 5000 INJECTION INTRAVENOUS; SUBCUTANEOUS at 05:03

## 2019-10-11 RX ADMIN — Medication 40 MILLIGRAM(S): at 05:03

## 2019-10-11 RX ADMIN — Medication 60 MILLIGRAM(S): at 05:02

## 2019-10-11 RX ADMIN — Medication 4: at 11:31

## 2019-10-11 RX ADMIN — PANTOPRAZOLE SODIUM 40 MILLIGRAM(S): 20 TABLET, DELAYED RELEASE ORAL at 06:39

## 2019-10-11 RX ADMIN — Medication 60 MILLIGRAM(S): at 13:07

## 2019-10-11 RX ADMIN — Medication 1: at 08:32

## 2019-10-11 RX ADMIN — Medication 50 MICROGRAM(S): at 05:03

## 2019-10-11 RX ADMIN — Medication 81 MILLIGRAM(S): at 11:31

## 2019-10-11 NOTE — DISCHARGE NOTE PROVIDER - PROVIDER TOKENS
PROVIDER:[TOKEN:[[id number]:MIIS:[id number]],FOLLOWUP:[1 week]],FREE:[LAST:[.],PHONE:[(   )    -],FAX:[(   )    -],ADDRESS:[- PCP upon discharge from rehab in a week.]]

## 2019-10-11 NOTE — DISCHARGE NOTE NURSING/CASE MANAGEMENT/SOCIAL WORK - NSDCPEPT PROEDHF_GEN_ALL_CORE
Activities as tolerated/Monitor weight daily/Report signs and symptoms to primary care provider/Call primary care provider for follow up after discharge/Low salt diet

## 2019-10-11 NOTE — DISCHARGE NOTE NURSING/CASE MANAGEMENT/SOCIAL WORK - PATIENT PORTAL LINK FT
You can access the FollowMyHealth Patient Portal offered by [facility] by registering at the following website: [email or web address]. By joining [employer]’s FollowMyHealth portal, you will also be able to view your health information using other applications (apps) compatible with our system.

## 2019-10-11 NOTE — DISCHARGE NOTE PROVIDER - HOSPITAL COURSE
89yo F pmhx HTN HLD BIBEMS from assisted living accompanied by daughter. Pt. is AOx0, per daughter this is not her baseline. Daughter provides history. States she has been with pt. since yesterday and she is increasingly confused, very repetitive and saying things that don't make sense. Reports she mentioned difficulty breathing today so she called the ambulance. Pt. currently denies SOB. Denies pain/discomfort. in the emergency room she was found to be in rapid atrial fibrillation - she denies palpitations chest pain or short of breath.  She was admitted for A fib with RVR, acute on chronic heart failure, started on cardizem, evaluated by cardiology, treated with Rocephin. 89yo F pmhx HTN HLD BIBEMS from assisted living accompanied by daughter. Pt. is AOx0, per daughter this is not her baseline. Daughter provides history. States she has been with pt. since yesterday and she is increasingly confused, very repetitive and saying things that don't make sense. Reports she mentioned difficulty breathing today so she called the ambulance. Pt. currently denies SOB. Denies pain/discomfort. in the emergency room she was found to be in rapid atrial fibrillation - she denies palpitations chest pain or short of breath.  She was admitted for A fib with RVR, acute on chronic heart failure, started on cardizem, evaluated by cardiology, treated with Rocephin.  Echocardiogram was done, heart meds adjusted.  At present, patient is stable and will be discharged to rehab today.            Atrial fibrillation with RVR - improved    - c/w Cardizem    - TSH is 2.96    - Echocardiogram - with combined systocic and diastoic heart failure    - c/w ASA, as per cardio patient is not a candidate for AC due to falls    - Cardio is following.        Metabolic encephalopathy    - likely from CHF and A. 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        She was cleared by cardiology for discharge to [facility].

## 2019-10-11 NOTE — DISCHARGE NOTE PROVIDER - CARE PROVIDER_API CALL
Madison Braswell)  Cardiology; Interventional Cardiology  [address], [place] [id number]  Phone: [phone]  Fax: [phone]  Follow Up Time: 1 week    .,   - PCP upon discharge from rehab in a week.  Phone: (   )    -  Fax: (   )    -  Follow Up Time:

## 2019-10-12 ENCOUNTER — OUTPATIENT (OUTPATIENT)
Dept: OUTPATIENT SERVICES | Facility: HOSPITAL | Age: 84
LOS: 1 days | Discharge: HOME | End: 2019-10-12
Payer: MEDICARE

## 2019-10-12 DIAGNOSIS — Z98.890 OTHER SPECIFIED POSTPROCEDURAL STATES: Chronic | ICD-10-CM

## 2019-10-12 DIAGNOSIS — S62.617A DISPLACED FRACTURE OF PROXIMAL PHALANX OF LEFT LITTLE FINGER, INITIAL ENCOUNTER FOR CLOSED FRACTURE: ICD-10-CM

## 2019-10-12 PROCEDURE — 73140 X-RAY EXAM OF FINGER(S): CPT | Mod: 26,LT

## 2019-10-12 PROCEDURE — 99305 1ST NF CARE MODERATE MDM 35: CPT

## 2019-10-14 ENCOUNTER — INBOUND DOCUMENT (OUTPATIENT)
Age: 84
End: 2019-10-14

## 2019-10-15 DIAGNOSIS — E11.51 TYPE 2 DIABETES MELLITUS WITH DIABETIC PERIPHERAL ANGIOPATHY WITHOUT GANGRENE: ICD-10-CM

## 2019-10-15 DIAGNOSIS — Z90.710 ACQUIRED ABSENCE OF BOTH CERVIX AND UTERUS: ICD-10-CM

## 2019-10-15 DIAGNOSIS — I48.0 PAROXYSMAL ATRIAL FIBRILLATION: ICD-10-CM

## 2019-10-15 DIAGNOSIS — Z91.81 HISTORY OF FALLING: ICD-10-CM

## 2019-10-15 DIAGNOSIS — N17.9 ACUTE KIDNEY FAILURE, UNSPECIFIED: ICD-10-CM

## 2019-10-15 DIAGNOSIS — Z88.1 ALLERGY STATUS TO OTHER ANTIBIOTIC AGENTS STATUS: ICD-10-CM

## 2019-10-15 DIAGNOSIS — E78.5 HYPERLIPIDEMIA, UNSPECIFIED: ICD-10-CM

## 2019-10-15 DIAGNOSIS — K25.9 GASTRIC ULCER, UNSPECIFIED AS ACUTE OR CHRONIC, WITHOUT HEMORRHAGE OR PERFORATION: ICD-10-CM

## 2019-10-15 DIAGNOSIS — I13.0 HYPERTENSIVE HEART AND CHRONIC KIDNEY DISEASE WITH HEART FAILURE AND STAGE 1 THROUGH STAGE 4 CHRONIC KIDNEY DISEASE, OR UNSPECIFIED CHRONIC KIDNEY DISEASE: ICD-10-CM

## 2019-10-15 DIAGNOSIS — G93.41 METABOLIC ENCEPHALOPATHY: ICD-10-CM

## 2019-10-15 DIAGNOSIS — E87.6 HYPOKALEMIA: ICD-10-CM

## 2019-10-15 DIAGNOSIS — E03.9 HYPOTHYROIDISM, UNSPECIFIED: ICD-10-CM

## 2019-10-15 DIAGNOSIS — E11.22 TYPE 2 DIABETES MELLITUS WITH DIABETIC CHRONIC KIDNEY DISEASE: ICD-10-CM

## 2019-10-15 DIAGNOSIS — E44.0 MODERATE PROTEIN-CALORIE MALNUTRITION: ICD-10-CM

## 2019-10-15 DIAGNOSIS — N18.3 CHRONIC KIDNEY DISEASE, STAGE 3 (MODERATE): ICD-10-CM

## 2019-10-15 DIAGNOSIS — I50.43 ACUTE ON CHRONIC COMBINED SYSTOLIC (CONGESTIVE) AND DIASTOLIC (CONGESTIVE) HEART FAILURE: ICD-10-CM

## 2019-11-02 ENCOUNTER — OUTPATIENT (OUTPATIENT)
Dept: OUTPATIENT SERVICES | Facility: HOSPITAL | Age: 84
LOS: 1 days | Discharge: HOME | End: 2019-11-02
Payer: MEDICARE

## 2019-11-02 DIAGNOSIS — Z98.890 OTHER SPECIFIED POSTPROCEDURAL STATES: Chronic | ICD-10-CM

## 2019-11-02 DIAGNOSIS — R91.8 OTHER NONSPECIFIC ABNORMAL FINDING OF LUNG FIELD: ICD-10-CM

## 2019-11-02 PROCEDURE — 73501 X-RAY EXAM HIP UNI 1 VIEW: CPT | Mod: 26,LT

## 2019-11-02 PROCEDURE — 70450 CT HEAD/BRAIN W/O DYE: CPT | Mod: 26

## 2019-11-02 PROCEDURE — 71045 X-RAY EXAM CHEST 1 VIEW: CPT | Mod: 26

## 2019-11-15 ENCOUNTER — INPATIENT (INPATIENT)
Facility: HOSPITAL | Age: 84
LOS: 3 days | Discharge: NURSING, REHAB OR LONG-TERM CARE | End: 2019-11-19
Attending: INTERNAL MEDICINE | Admitting: INTERNAL MEDICINE
Payer: MEDICARE

## 2019-11-15 VITALS
SYSTOLIC BLOOD PRESSURE: 119 MMHG | OXYGEN SATURATION: 98 % | TEMPERATURE: 99 F | WEIGHT: 149.91 LBS | RESPIRATION RATE: 18 BRPM | DIASTOLIC BLOOD PRESSURE: 65 MMHG | HEART RATE: 62 BPM

## 2019-11-15 DIAGNOSIS — Z87.19 PERSONAL HISTORY OF OTHER DISEASES OF THE DIGESTIVE SYSTEM: ICD-10-CM

## 2019-11-15 DIAGNOSIS — I48.91 UNSPECIFIED ATRIAL FIBRILLATION: ICD-10-CM

## 2019-11-15 DIAGNOSIS — I50.42 CHRONIC COMBINED SYSTOLIC (CONGESTIVE) AND DIASTOLIC (CONGESTIVE) HEART FAILURE: ICD-10-CM

## 2019-11-15 DIAGNOSIS — Z98.890 OTHER SPECIFIED POSTPROCEDURAL STATES: Chronic | ICD-10-CM

## 2019-11-15 DIAGNOSIS — R55 SYNCOPE AND COLLAPSE: ICD-10-CM

## 2019-11-15 DIAGNOSIS — E03.9 HYPOTHYROIDISM, UNSPECIFIED: ICD-10-CM

## 2019-11-15 DIAGNOSIS — E78.5 HYPERLIPIDEMIA, UNSPECIFIED: ICD-10-CM

## 2019-11-15 DIAGNOSIS — K56.41 FECAL IMPACTION: ICD-10-CM

## 2019-11-15 DIAGNOSIS — I10 ESSENTIAL (PRIMARY) HYPERTENSION: ICD-10-CM

## 2019-11-15 LAB
ALBUMIN SERPL ELPH-MCNC: 3.8 G/DL — SIGNIFICANT CHANGE UP (ref 3.3–5)
ALP SERPL-CCNC: 115 U/L — SIGNIFICANT CHANGE UP (ref 40–120)
ALT FLD-CCNC: 14 U/L — SIGNIFICANT CHANGE UP (ref 12–78)
ANION GAP SERPL CALC-SCNC: 9 MMOL/L — SIGNIFICANT CHANGE UP (ref 5–17)
APPEARANCE UR: CLEAR — SIGNIFICANT CHANGE UP
APTT BLD: 33.4 SEC — SIGNIFICANT CHANGE UP (ref 27.5–36.3)
AST SERPL-CCNC: 30 U/L — SIGNIFICANT CHANGE UP (ref 15–37)
BACTERIA # UR AUTO: ABNORMAL
BILIRUB SERPL-MCNC: 0.8 MG/DL — SIGNIFICANT CHANGE UP (ref 0.2–1.2)
BILIRUB UR-MCNC: NEGATIVE — SIGNIFICANT CHANGE UP
BUN SERPL-MCNC: 26 MG/DL — HIGH (ref 7–23)
CALCIUM SERPL-MCNC: 9.5 MG/DL — SIGNIFICANT CHANGE UP (ref 8.5–10.1)
CHLORIDE SERPL-SCNC: 99 MMOL/L — SIGNIFICANT CHANGE UP (ref 96–108)
CO2 SERPL-SCNC: 28 MMOL/L — SIGNIFICANT CHANGE UP (ref 22–31)
COLOR SPEC: YELLOW — SIGNIFICANT CHANGE UP
CREAT SERPL-MCNC: 1.27 MG/DL — SIGNIFICANT CHANGE UP (ref 0.5–1.3)
DIFF PNL FLD: NEGATIVE — SIGNIFICANT CHANGE UP
EPI CELLS # UR: SIGNIFICANT CHANGE UP
GLUCOSE BLDC GLUCOMTR-MCNC: 114 MG/DL — HIGH (ref 70–99)
GLUCOSE SERPL-MCNC: 131 MG/DL — HIGH (ref 70–99)
GLUCOSE UR QL: NEGATIVE MG/DL — SIGNIFICANT CHANGE UP
HCT VFR BLD CALC: 42 % — SIGNIFICANT CHANGE UP (ref 34.5–45)
HGB BLD-MCNC: 13.6 G/DL — SIGNIFICANT CHANGE UP (ref 11.5–15.5)
INR BLD: 1 RATIO — SIGNIFICANT CHANGE UP (ref 0.88–1.16)
KETONES UR-MCNC: NEGATIVE — SIGNIFICANT CHANGE UP
LACTATE SERPL-SCNC: 1.7 MMOL/L — SIGNIFICANT CHANGE UP (ref 0.7–2)
LEUKOCYTE ESTERASE UR-ACNC: ABNORMAL
LIDOCAIN IGE QN: 32 U/L — LOW (ref 73–393)
MAGNESIUM SERPL-MCNC: 2.5 MG/DL — SIGNIFICANT CHANGE UP (ref 1.6–2.6)
MCHC RBC-ENTMCNC: 30.5 PG — SIGNIFICANT CHANGE UP (ref 27–34)
MCHC RBC-ENTMCNC: 32.4 GM/DL — SIGNIFICANT CHANGE UP (ref 32–36)
MCV RBC AUTO: 94.2 FL — SIGNIFICANT CHANGE UP (ref 80–100)
NITRITE UR-MCNC: NEGATIVE — SIGNIFICANT CHANGE UP
NRBC # BLD: 0 /100 WBCS — SIGNIFICANT CHANGE UP (ref 0–0)
NT-PROBNP SERPL-SCNC: 3822 PG/ML — HIGH (ref 0–450)
PH UR: 7 — SIGNIFICANT CHANGE UP (ref 5–8)
PLATELET # BLD AUTO: 227 K/UL — SIGNIFICANT CHANGE UP (ref 150–400)
POTASSIUM SERPL-MCNC: 4.7 MMOL/L — SIGNIFICANT CHANGE UP (ref 3.5–5.3)
POTASSIUM SERPL-SCNC: 4.7 MMOL/L — SIGNIFICANT CHANGE UP (ref 3.5–5.3)
PROT SERPL-MCNC: 7.4 GM/DL — SIGNIFICANT CHANGE UP (ref 6–8.3)
PROT UR-MCNC: NEGATIVE MG/DL — SIGNIFICANT CHANGE UP
PROTHROM AB SERPL-ACNC: 11.2 SEC — SIGNIFICANT CHANGE UP (ref 10–12.9)
RBC # BLD: 4.46 M/UL — SIGNIFICANT CHANGE UP (ref 3.8–5.2)
RBC # FLD: 13.9 % — SIGNIFICANT CHANGE UP (ref 10.3–14.5)
RBC CASTS # UR COMP ASSIST: SIGNIFICANT CHANGE UP /HPF (ref 0–4)
SODIUM SERPL-SCNC: 136 MMOL/L — SIGNIFICANT CHANGE UP (ref 135–145)
SP GR SPEC: 1.01 — SIGNIFICANT CHANGE UP (ref 1.01–1.02)
TROPONIN I SERPL-MCNC: <.015 NG/ML — SIGNIFICANT CHANGE UP (ref 0.01–0.04)
TSH SERPL-MCNC: 4.94 UU/ML — HIGH (ref 0.36–3.74)
UROBILINOGEN FLD QL: NEGATIVE MG/DL — SIGNIFICANT CHANGE UP
WBC # BLD: 12.16 K/UL — HIGH (ref 3.8–10.5)
WBC # FLD AUTO: 12.16 K/UL — HIGH (ref 3.8–10.5)
WBC UR QL: SIGNIFICANT CHANGE UP

## 2019-11-15 PROCEDURE — 93010 ELECTROCARDIOGRAM REPORT: CPT

## 2019-11-15 PROCEDURE — 70450 CT HEAD/BRAIN W/O DYE: CPT | Mod: 26

## 2019-11-15 PROCEDURE — 99285 EMERGENCY DEPT VISIT HI MDM: CPT

## 2019-11-15 PROCEDURE — 99223 1ST HOSP IP/OBS HIGH 75: CPT | Mod: AI

## 2019-11-15 PROCEDURE — 74176 CT ABD & PELVIS W/O CONTRAST: CPT | Mod: 26

## 2019-11-15 PROCEDURE — 71045 X-RAY EXAM CHEST 1 VIEW: CPT | Mod: 26

## 2019-11-15 RX ORDER — LEVOTHYROXINE SODIUM 125 MCG
50 TABLET ORAL DAILY
Refills: 0 | Status: DISCONTINUED | OUTPATIENT
Start: 2019-11-15 | End: 2019-11-19

## 2019-11-15 RX ORDER — SIMVASTATIN 20 MG/1
10 TABLET, FILM COATED ORAL AT BEDTIME
Refills: 0 | Status: DISCONTINUED | OUTPATIENT
Start: 2019-11-15 | End: 2019-11-19

## 2019-11-15 RX ORDER — DILTIAZEM HCL 120 MG
30 CAPSULE, EXT RELEASE 24 HR ORAL EVERY 8 HOURS
Refills: 0 | Status: DISCONTINUED | OUTPATIENT
Start: 2019-11-15 | End: 2019-11-19

## 2019-11-15 RX ORDER — INFLUENZA VIRUS VACCINE 15; 15; 15; 15 UG/.5ML; UG/.5ML; UG/.5ML; UG/.5ML
0.5 SUSPENSION INTRAMUSCULAR ONCE
Refills: 0 | Status: COMPLETED | OUTPATIENT
Start: 2019-11-15 | End: 2019-11-15

## 2019-11-15 RX ORDER — ONDANSETRON 8 MG/1
4 TABLET, FILM COATED ORAL EVERY 6 HOURS
Refills: 0 | Status: DISCONTINUED | OUTPATIENT
Start: 2019-11-15 | End: 2019-11-19

## 2019-11-15 RX ORDER — POLYETHYLENE GLYCOL 3350 17 G/17G
17 POWDER, FOR SOLUTION ORAL DAILY
Refills: 0 | Status: DISCONTINUED | OUTPATIENT
Start: 2019-11-15 | End: 2019-11-17

## 2019-11-15 RX ORDER — LISINOPRIL 2.5 MG/1
10 TABLET ORAL DAILY
Refills: 0 | Status: DISCONTINUED | OUTPATIENT
Start: 2019-11-15 | End: 2019-11-17

## 2019-11-15 RX ORDER — LACTULOSE 10 G/15ML
10 SOLUTION ORAL
Refills: 0 | Status: COMPLETED | OUTPATIENT
Start: 2019-11-15 | End: 2019-11-17

## 2019-11-15 RX ORDER — PANTOPRAZOLE SODIUM 20 MG/1
40 TABLET, DELAYED RELEASE ORAL
Refills: 0 | Status: DISCONTINUED | OUTPATIENT
Start: 2019-11-15 | End: 2019-11-19

## 2019-11-15 RX ORDER — SODIUM CHLORIDE 9 MG/ML
1000 INJECTION INTRAMUSCULAR; INTRAVENOUS; SUBCUTANEOUS ONCE
Refills: 0 | Status: COMPLETED | OUTPATIENT
Start: 2019-11-15 | End: 2019-11-15

## 2019-11-15 RX ORDER — FUROSEMIDE 40 MG
40 TABLET ORAL DAILY
Refills: 0 | Status: DISCONTINUED | OUTPATIENT
Start: 2019-11-15 | End: 2019-11-17

## 2019-11-15 RX ORDER — ASPIRIN/CALCIUM CARB/MAGNESIUM 324 MG
81 TABLET ORAL DAILY
Refills: 0 | Status: DISCONTINUED | OUTPATIENT
Start: 2019-11-15 | End: 2019-11-19

## 2019-11-15 RX ORDER — ACETAMINOPHEN 500 MG
650 TABLET ORAL EVERY 6 HOURS
Refills: 0 | Status: DISCONTINUED | OUTPATIENT
Start: 2019-11-15 | End: 2019-11-19

## 2019-11-15 RX ADMIN — SIMVASTATIN 10 MILLIGRAM(S): 20 TABLET, FILM COATED ORAL at 23:03

## 2019-11-15 RX ADMIN — SODIUM CHLORIDE 1000 MILLILITER(S): 9 INJECTION INTRAMUSCULAR; INTRAVENOUS; SUBCUTANEOUS at 13:20

## 2019-11-15 RX ADMIN — POLYETHYLENE GLYCOL 3350 17 GRAM(S): 17 POWDER, FOR SOLUTION ORAL at 17:43

## 2019-11-15 RX ADMIN — Medication 30 MILLIGRAM(S): at 23:03

## 2019-11-15 RX ADMIN — SODIUM CHLORIDE 1000 MILLILITER(S): 9 INJECTION INTRAMUSCULAR; INTRAVENOUS; SUBCUTANEOUS at 14:20

## 2019-11-15 RX ADMIN — LACTULOSE 10 GRAM(S): 10 SOLUTION ORAL at 17:44

## 2019-11-15 NOTE — H&P ADULT - PROBLEM SELECTOR PLAN 2
rate controlled currently    may need digoxin  or at lower dose of  bp meds   no ac do to recurrent falls rate controlled currently    may need digoxin  or at lower dose of  bp meds   no AC do to recurrent falls

## 2019-11-15 NOTE — ED ADULT NURSE NOTE - PMH
Afib    DM (diabetes mellitus) type II controlled peripheral vascular disorder    Gastric ulcer    H/O abdominal hysterectomy    History of cholecystectomy    Hyperlipidemia    Hypertension    Hypothyroid

## 2019-11-15 NOTE — PATIENT PROFILE ADULT - FUNCTIONAL SCREEN CURRENT LEVEL: COMMUNICATION, MLM
3 = unable to understand (not related to language barrier) 0 = understands/communicates without difficulty

## 2019-11-15 NOTE — H&P ADULT - NSICDXPASTMEDICALHX_GEN_ALL_CORE_FT
PAST MEDICAL HISTORY:  Afib     DM (diabetes mellitus) type II controlled peripheral vascular disorder     Gastric ulcer     H/O abdominal hysterectomy     History of cholecystectomy     Hyperlipidemia     Hypertension     Hypothyroid

## 2019-11-15 NOTE — ED PROVIDER NOTE - CONSTITUTIONAL, MLM
normal... Well appearing, well nourished, awake, alert, oriented to person, place, time/situation and in no apparent distress, well appearing

## 2019-11-15 NOTE — ED ADULT NURSE NOTE - OBJECTIVE STATEMENT
s/p syncope as per [facility], s/p syncope episodes since saturday. pt also c/o abdominal discomfort hx: frequency fall, systolic and diastolic dysfuction

## 2019-11-15 NOTE — H&P ADULT - PROBLEM SELECTOR PLAN 1
presumed due to Hypotension with symptoms:   fall preacutions   adjust bp meds presumed due to Hypotension with symptoms:   fall precautions   adjust bp meds

## 2019-11-15 NOTE — H&P ADULT - NSHPPHYSICALEXAM_GEN_ALL_CORE
Vital Signs Last 24 Hrs  T(C): 36.3 (15 Nov 2019 15:43), Max: 37.1 (15 Nov 2019 12:07)  T(F): 97.3 (15 Nov 2019 15:43), Max: 98.7 (15 Nov 2019 12:07)  HR: 95 (15 Nov 2019 15:43) (62 - 95)  BP: 143/72 (15 Nov 2019 15:43) (119/65 - 143/72)  BP(mean): --  RR: 19 (15 Nov 2019 15:43) (18 - 19)  SpO2: 100% (15 Nov 2019 15:43) (98% - 100%)    GENERAL: NAD, well-groomed, well-developed  HEAD:  Atraumatic, Normocephalic  EYES: EOMI, PERRLA, conjunctiva and sclera clear  ENMT: No tonsillar erythema, exudates, or enlargement; Moist mucous membranes, Good dentition, No lesions  NECK: Supple, No JVD, Normal thyroid  NERVOUS SYSTEM:  Alert & Oriented X3, Good concentration; Motor Strength 5/5 B/L upper and lower extremities; DTRs 2+ intact and symmetric  CHEST/LUNG: Clear to percussion bilaterally; No rales, rhonchi, wheezing, or rubs  HEART: Regular rate and rhythm; No murmurs, rubs, or gallops  ABDOMEN: Soft, Nontender, Nondistended; Bowel sounds present  EXTREMITIES:  2+ Peripheral Pulses, No clubbing, cyanosis, or edema  SKIN: No rashes or lesions Vital Signs Last 24 Hrs  T(C): 36.3 (15 Nov 2019 15:43), Max: 37.1 (15 Nov 2019 12:07)  T(F): 97.3 (15 Nov 2019 15:43), Max: 98.7 (15 Nov 2019 12:07)  HR: 95 (15 Nov 2019 15:43) (62 - 95)  BP: 143/72 (15 Nov 2019 15:43) (119/65 - 143/72)  BP(mean): --  RR: 19 (15 Nov 2019 15:43) (18 - 19)  SpO2: 100% (15 Nov 2019 15:43) (98% - 100%)    GENERAL: NAD, well-groomed, well-developed, elderly   HEAD:  Atraumatic, Normocephalic  EYES: EOMI, PERRLA, conjunctiva and sclera clear  ENMT: No tonsillar erythema, exudates, or enlargement; Moist mucous membranes, Good dentition, No lesions  NECK: Supple, No JVD, Normal thyroid  NERVOUS SYSTEM:  Alert & Oriented X1,  fair concentration; Motor Strength 4/5 B/L upper and lower extremities; DTRs 2+ intact and symmetric  CHEST/LUNG: Clear to percussion bilaterally; No rales, rhonchi, wheezing, or rubs  HEART: irregular rate and rhythm; No murmurs, rubs, or gallops  ABDOMEN: Soft, Nontender, Nondistended; Bowel sounds present  EXTREMITIES:  2+ Peripheral Pulses, No clubbing, cyanosis, or edema  SKIN: No rashes or lesions

## 2019-11-15 NOTE — ED ADULT NURSE NOTE - CHIEF COMPLAINT QUOTE
pt was recently diagnosed with Afib with RVR, have been having recurrent episodes of syncope and Hypotension, sent from [facility] to follow up

## 2019-11-15 NOTE — ED PROVIDER NOTE - OBJECTIVE STATEMENT
89 yo with hith htn, gastric ulcer, hypothyroidism, HLD, CHF, ckg, DMII, chronic heart failure, systolic and diastolyic heart failure, Afib with RVR, presents for evaluation of intermittent syncope and hypotension x6 days. Patient poor historian.

## 2019-11-15 NOTE — ED ADULT TRIAGE NOTE - CHIEF COMPLAINT QUOTE
pt was recently diagnosed with Afib with RVR, have been having recurrent episodes of syncope and Hypotension, sent from [facility] to follow up

## 2019-11-15 NOTE — H&P ADULT - ASSESSMENT
89 yo with hith htn, gastric ulcer, hypothyroidism, HLD, CHF, ckg, DMII, chronic heart failure, systolic and diastolic heart failure, Afib with RVR, presents for evaluation of intermittent syncope and hypotension x6 days.

## 2019-11-15 NOTE — H&P ADULT - NSHPLABSRESULTS_GEN_ALL_CORE
13.6   12.16 )-----------( 227      ( 15 Nov 2019 13:23 )             42.0     11-15    136  |  99  |  26<H>  ----------------------------<  131<H>  4.7   |  28  |  1.27    Ca    9.5      15 Nov 2019 13:23  Mg     2.5     11-15    TPro  7.4  /  Alb  3.8  /  TBili  0.8  /  DBili  x   /  AST  30  /  ALT  14  /  AlkPhos  115  11-15    Troponin I, Serum (11.15.19 @ 13:23)    Troponin I, Serum: <.015: The new reference range for Troponin-I performed on the Siemens Vista  system is 0.015-0.045 ng/mL, which includes the 99th percentile of a  healthy reference population. Studies have shown that elevated troponin  levels above the 99th percentile cutoff are associated with an increased  risk for adverse cardiac events, with the risk increasing as troponin  levels increase. As per a joint committee of the American College of  Cardiology and European Society of Cardiology, diagnosis of classic MI is  based upon the detection of a rise or fall of cardiac troponin values,  with at least one value above the 99th percentile upper reference limit,  in the appropriate clinical context.  Troponin-I (ng/mL) Interpretation  0.00-0.045 Normal range (includes the 99th percentile of a healthy  reference population)  >0.045 Elevated troponin level indicating increased risk  Note: Troponin-I and Troponin-T cannot be used interchangeably in serial  measurements. Minimally elevated Troponin results should be interpreted  in the context of clinical findings and risk factors. ng/mL    < from: CT Head No Cont (11.15.19 @ 14:50) >      IMPRESSION:    1)  involutional changes and volume loss. No acute infarct hemorrhage or   mass.  2) clear sinuses and mastoids..          < end of copied text >  < from: CT Abdomen and Pelvis No Cont (11.15.19 @ 14:49) >      IMPRESSION:     Large amount of stool within the rectum which measures 9.7 x 8.8 cm,   compatible with fecal impaction.    No acute intra-abdominal findings to suggest an alternate cause for   patient's symptoms.            < end of copied text >

## 2019-11-15 NOTE — H&P ADULT - HISTORY OF PRESENT ILLNESS
HPI Objective Statement: 89 yo with hith htn, gastric ulcer, hypothyroidism, HLD, CHF, ckg, DMII, chronic heart failure, systolic and diastolic heart failure, Afib with RVR, presents for evaluation of intermittent syncope and hypotension x6 days. Patient poor historian. Per Ed attending pt was at [facility] in day and had syncopal episode jason was in 70s systolic . joe in ed bp has normalized . ? orthostatic Hypotension with symptoms: pt is poor historian.

## 2019-11-15 NOTE — ED PROVIDER NOTE - CPE EDP SKIN NORM
Forward to Dr Darío GILLESPIE  
From: Shanon Vergara  To: Bev Chanel DO  Sent: 4/22/2019 2:41 PM CDT  Subject: Medication Question    Ok- will do and I will continue to monitor.  
normal...

## 2019-11-15 NOTE — ED ADULT NURSE NOTE - NSIMPLEMENTINTERV_GEN_ALL_ED
Implemented All Fall Risk Interventions:  [place] to call system. Call bell, personal items and telephone within reach. Instruct patient to call for assistance. Room bathroom lighting operational. Non-slip footwear when patient is off stretcher. Physically safe environment: no spills, clutter or unnecessary equipment. Stretcher in lowest position, wheels locked, appropriate side rails in place. Provide visual cue, wrist band, yellow gown, etc. Monitor gait and stability. Monitor for mental status changes and reorient to person, place, and time. Review medications for side effects contributing to fall risk. Reinforce activity limits and safety measures with patient and family.

## 2019-11-16 LAB
ANION GAP SERPL CALC-SCNC: 7 MMOL/L — SIGNIFICANT CHANGE UP (ref 5–17)
BUN SERPL-MCNC: 19 MG/DL — SIGNIFICANT CHANGE UP (ref 7–23)
CALCIUM SERPL-MCNC: 9 MG/DL — SIGNIFICANT CHANGE UP (ref 8.5–10.1)
CHLORIDE SERPL-SCNC: 101 MMOL/L — SIGNIFICANT CHANGE UP (ref 96–108)
CO2 SERPL-SCNC: 29 MMOL/L — SIGNIFICANT CHANGE UP (ref 22–31)
CREAT SERPL-MCNC: 1.07 MG/DL — SIGNIFICANT CHANGE UP (ref 0.5–1.3)
CULTURE RESULTS: SIGNIFICANT CHANGE UP
GLUCOSE SERPL-MCNC: 171 MG/DL — HIGH (ref 70–99)
HCT VFR BLD CALC: 39.5 % — SIGNIFICANT CHANGE UP (ref 34.5–45)
HGB BLD-MCNC: 12.7 G/DL — SIGNIFICANT CHANGE UP (ref 11.5–15.5)
MAGNESIUM SERPL-MCNC: 2.5 MG/DL — SIGNIFICANT CHANGE UP (ref 1.6–2.6)
MCHC RBC-ENTMCNC: 30.4 PG — SIGNIFICANT CHANGE UP (ref 27–34)
MCHC RBC-ENTMCNC: 32.2 GM/DL — SIGNIFICANT CHANGE UP (ref 32–36)
MCV RBC AUTO: 94.5 FL — SIGNIFICANT CHANGE UP (ref 80–100)
NRBC # BLD: 0 /100 WBCS — SIGNIFICANT CHANGE UP (ref 0–0)
PHOSPHATE SERPL-MCNC: 2.9 MG/DL — SIGNIFICANT CHANGE UP (ref 2.5–4.5)
PLATELET # BLD AUTO: 200 K/UL — SIGNIFICANT CHANGE UP (ref 150–400)
POTASSIUM SERPL-MCNC: 3.8 MMOL/L — SIGNIFICANT CHANGE UP (ref 3.5–5.3)
POTASSIUM SERPL-SCNC: 3.8 MMOL/L — SIGNIFICANT CHANGE UP (ref 3.5–5.3)
RBC # BLD: 4.18 M/UL — SIGNIFICANT CHANGE UP (ref 3.8–5.2)
RBC # FLD: 13.7 % — SIGNIFICANT CHANGE UP (ref 10.3–14.5)
SODIUM SERPL-SCNC: 137 MMOL/L — SIGNIFICANT CHANGE UP (ref 135–145)
SPECIMEN SOURCE: SIGNIFICANT CHANGE UP
WBC # BLD: 8.34 K/UL — SIGNIFICANT CHANGE UP (ref 3.8–10.5)
WBC # FLD AUTO: 8.34 K/UL — SIGNIFICANT CHANGE UP (ref 3.8–10.5)

## 2019-11-16 PROCEDURE — 99233 SBSQ HOSP IP/OBS HIGH 50: CPT

## 2019-11-16 RX ORDER — DIGOXIN 250 MCG
0.12 TABLET ORAL ONCE
Refills: 0 | Status: COMPLETED | OUTPATIENT
Start: 2019-11-16 | End: 2019-11-16

## 2019-11-16 RX ADMIN — Medication 0.12 MILLIGRAM(S): at 14:19

## 2019-11-16 RX ADMIN — LACTULOSE 10 GRAM(S): 10 SOLUTION ORAL at 06:15

## 2019-11-16 RX ADMIN — PANTOPRAZOLE SODIUM 40 MILLIGRAM(S): 20 TABLET, DELAYED RELEASE ORAL at 06:06

## 2019-11-16 RX ADMIN — POLYETHYLENE GLYCOL 3350 17 GRAM(S): 17 POWDER, FOR SOLUTION ORAL at 12:26

## 2019-11-16 RX ADMIN — Medication 81 MILLIGRAM(S): at 12:25

## 2019-11-16 RX ADMIN — Medication 30 MILLIGRAM(S): at 06:05

## 2019-11-16 RX ADMIN — Medication 30 MILLIGRAM(S): at 21:34

## 2019-11-16 RX ADMIN — Medication 40 MILLIGRAM(S): at 06:05

## 2019-11-16 RX ADMIN — SIMVASTATIN 10 MILLIGRAM(S): 20 TABLET, FILM COATED ORAL at 21:34

## 2019-11-16 RX ADMIN — Medication 50 MICROGRAM(S): at 06:05

## 2019-11-16 RX ADMIN — Medication 30 MILLIGRAM(S): at 12:26

## 2019-11-16 RX ADMIN — LISINOPRIL 10 MILLIGRAM(S): 2.5 TABLET ORAL at 06:05

## 2019-11-16 NOTE — PROGRESS NOTE ADULT - SUBJECTIVE AND OBJECTIVE BOX
Patient is a 89y old  Female who presents with a chief complaint of syncope (15 Nov 2019 16:07)      OVERNIGHT EVENTS:      REVIEW OF SYSTEMS: denies chest pain/SOB, diaphoresis, no F/C, cough, dizziness, headache, blurry vision, nausea, vomiting, abdominal pain. Rest unremarkable     MEDICATIONS  (STANDING):  aspirin enteric coated 81 milliGRAM(s) Oral daily  digoxin     Tablet 0.125 milliGRAM(s) Oral once  diltiazem    Tablet 30 milliGRAM(s) Oral every 8 hours  furosemide    Tablet 40 milliGRAM(s) Oral daily  influenza   Vaccine 0.5 milliLiter(s) IntraMuscular once  lactulose Syrup 10 Gram(s) Oral two times a day  levothyroxine 50 MICROGram(s) Oral daily  lisinopril 10 milliGRAM(s) Oral daily  pantoprazole    Tablet 40 milliGRAM(s) Oral before breakfast  polyethylene glycol 3350 17 Gram(s) Oral daily  simvastatin 10 milliGRAM(s) Oral at bedtime    MEDICATIONS  (PRN):  acetaminophen   Tablet .. 650 milliGRAM(s) Oral every 6 hours PRN Temp greater or equal to 38C (100.4F), Mild Pain (1 - 3)  ondansetron Injectable 4 milliGRAM(s) IV Push every 6 hours PRN Nausea and/or Vomiting      Allergies    ciprofloxacin (Unknown)  Flagyl (Hives)    Intolerances        SUBJECTIVE: in bed in NAD, no acute events overnight     T(F): 98 (19 @ 11:05), Max: 98 (19 @ 11:05)  HR: 97 (19 @ 11:05) (93 - 110)  BP: 147/99 (19 @ 11:05) (143/71 - 149/74)  RR: 18 (19 @ 11:05) (18 - 19)  SpO2: 100% (19 @ 11:05) (97% - 100%)  Wt(kg): --    PHYSICAL EXAM:  GENERAL: NAD, well-groomed, well-developed  HEAD:  Atraumatic, Normocephalic  EYES: EOMI, PERRLA, conjunctiva and sclera clear  ENMT: No tonsillar erythema, exudates, or enlargement; Moist mucous membranes, Good dentition, No lesions  NECK: Supple, No JVD, Normal thyroid  CHEST/LUNG: Clear to  auscultation bilaterally; No rales, rhonchi, wheezing, or rubs  bilaterally  HEART: Regular rate and rhythm; No murmurs, rubs, or gallops  ABDOMEN: Soft, Nontender, Nondistended; Bowel sounds present  EXTREMITIES:  2+ Peripheral Pulses, No clubbing, cyanosis, or edema BL LE  LYMPH: No lymphadenopathy noted  SKIN: No rashes or lesions  NERVOUS SYSTEM:  Alert & Oriented X3, Good concentration; Motor Strength 5/5 B/L upper and lower extremities;   DTRs 2+ intact and symmetric, sensation intact BL    LABS:                        12.7   8.34  )-----------( 200      ( 2019 07:49 )             39.5     11-16    137  |  101  |  19  ----------------------------<  171<H>  3.8   |  29  |  1.07    Ca    9.0      2019 07:49  Phos  2.9     11-16  Mg     2.5     -16    TPro  7.4  /  Alb  3.8  /  TBili  0.8  /  DBili  x   /  AST  30  /  ALT  14  /  AlkPhos  115  11-15    PT/INR - ( 15 Nov 2019 13:23 )   PT: 11.2 sec;   INR: 1.00 ratio         PTT - ( 15 Nov 2019 13:23 )  PTT:33.4 sec  Urinalysis Basic - ( 15 Nov 2019 13:23 )    Color: Yellow / Appearance: Clear / S.010 / pH: x  Gluc: x / Ketone: Negative  / Bili: Negative / Urobili: Negative mg/dL   Blood: x / Protein: Negative mg/dL / Nitrite: Negative   Leuk Esterase: Trace / RBC: 0-2 /HPF / WBC 3-5   Sq Epi: x / Non Sq Epi: Few / Bacteria: Few      Cultures;   CAPILLARY BLOOD GLUCOSE        Lipid panel:     CARDIAC MARKERS ( 15 Nov 2019 13:23 )  <.015 ng/mL / x     / x     / x     / x            RADIOLOGY & ADDITIONAL TESTS:      Imaging Personally Reviewed:  [ x] YES      Consultant(s) Notes Reviewed:  [x ] YES     Care Discussed with [x ] Consultants [X ] Patient [x ] Family  [x ]    [x ]  Other; RN

## 2019-11-16 NOTE — PROGRESS NOTE ADULT - PROBLEM SELECTOR PLAN 1
presumed due to Hypotension with symptoms:   fall precautions   adjust bp meds   check orthostatic today

## 2019-11-17 LAB
ANION GAP SERPL CALC-SCNC: 6 MMOL/L — SIGNIFICANT CHANGE UP (ref 5–17)
BUN SERPL-MCNC: 22 MG/DL — SIGNIFICANT CHANGE UP (ref 7–23)
CALCIUM SERPL-MCNC: 9 MG/DL — SIGNIFICANT CHANGE UP (ref 8.5–10.1)
CHLORIDE SERPL-SCNC: 103 MMOL/L — SIGNIFICANT CHANGE UP (ref 96–108)
CO2 SERPL-SCNC: 30 MMOL/L — SIGNIFICANT CHANGE UP (ref 22–31)
CREAT SERPL-MCNC: 1.09 MG/DL — SIGNIFICANT CHANGE UP (ref 0.5–1.3)
GLUCOSE SERPL-MCNC: 198 MG/DL — HIGH (ref 70–99)
HCT VFR BLD CALC: 39.7 % — SIGNIFICANT CHANGE UP (ref 34.5–45)
HGB BLD-MCNC: 12.8 G/DL — SIGNIFICANT CHANGE UP (ref 11.5–15.5)
MAGNESIUM SERPL-MCNC: 2.4 MG/DL — SIGNIFICANT CHANGE UP (ref 1.6–2.6)
MCHC RBC-ENTMCNC: 30.5 PG — SIGNIFICANT CHANGE UP (ref 27–34)
MCHC RBC-ENTMCNC: 32.2 GM/DL — SIGNIFICANT CHANGE UP (ref 32–36)
MCV RBC AUTO: 94.5 FL — SIGNIFICANT CHANGE UP (ref 80–100)
NRBC # BLD: 0 /100 WBCS — SIGNIFICANT CHANGE UP (ref 0–0)
PHOSPHATE SERPL-MCNC: 2.9 MG/DL — SIGNIFICANT CHANGE UP (ref 2.5–4.5)
PLATELET # BLD AUTO: 219 K/UL — SIGNIFICANT CHANGE UP (ref 150–400)
POTASSIUM SERPL-MCNC: 3.9 MMOL/L — SIGNIFICANT CHANGE UP (ref 3.5–5.3)
POTASSIUM SERPL-SCNC: 3.9 MMOL/L — SIGNIFICANT CHANGE UP (ref 3.5–5.3)
RBC # BLD: 4.2 M/UL — SIGNIFICANT CHANGE UP (ref 3.8–5.2)
RBC # FLD: 13.9 % — SIGNIFICANT CHANGE UP (ref 10.3–14.5)
SODIUM SERPL-SCNC: 139 MMOL/L — SIGNIFICANT CHANGE UP (ref 135–145)
WBC # BLD: 7.63 K/UL — SIGNIFICANT CHANGE UP (ref 3.8–10.5)
WBC # FLD AUTO: 7.63 K/UL — SIGNIFICANT CHANGE UP (ref 3.8–10.5)

## 2019-11-17 PROCEDURE — 99233 SBSQ HOSP IP/OBS HIGH 50: CPT

## 2019-11-17 RX ORDER — FUROSEMIDE 40 MG
20 TABLET ORAL DAILY
Refills: 0 | Status: DISCONTINUED | OUTPATIENT
Start: 2019-11-18 | End: 2019-11-19

## 2019-11-17 RX ORDER — POLYETHYLENE GLYCOL 3350 17 G/17G
17 POWDER, FOR SOLUTION ORAL DAILY
Refills: 0 | Status: DISCONTINUED | OUTPATIENT
Start: 2019-11-17 | End: 2019-11-19

## 2019-11-17 RX ORDER — DIGOXIN 250 MCG
0.12 TABLET ORAL DAILY
Refills: 0 | Status: DISCONTINUED | OUTPATIENT
Start: 2019-11-17 | End: 2019-11-19

## 2019-11-17 RX ADMIN — Medication 50 MICROGRAM(S): at 05:27

## 2019-11-17 RX ADMIN — SIMVASTATIN 10 MILLIGRAM(S): 20 TABLET, FILM COATED ORAL at 21:46

## 2019-11-17 RX ADMIN — LACTULOSE 10 GRAM(S): 10 SOLUTION ORAL at 05:28

## 2019-11-17 RX ADMIN — Medication 81 MILLIGRAM(S): at 12:14

## 2019-11-17 RX ADMIN — Medication 30 MILLIGRAM(S): at 05:27

## 2019-11-17 RX ADMIN — Medication 30 MILLIGRAM(S): at 21:46

## 2019-11-17 RX ADMIN — LISINOPRIL 10 MILLIGRAM(S): 2.5 TABLET ORAL at 05:27

## 2019-11-17 RX ADMIN — Medication 30 MILLIGRAM(S): at 13:12

## 2019-11-17 RX ADMIN — Medication 0.12 MILLIGRAM(S): at 12:14

## 2019-11-17 RX ADMIN — Medication 40 MILLIGRAM(S): at 05:27

## 2019-11-17 RX ADMIN — PANTOPRAZOLE SODIUM 40 MILLIGRAM(S): 20 TABLET, DELAYED RELEASE ORAL at 05:27

## 2019-11-17 NOTE — PROGRESS NOTE ADULT - PROBLEM SELECTOR PLAN 1
presumed due to Hypotension with symptoms:   fall precautions   adjust bp meds   checked orthostatics on 11/16/19 and was positive

## 2019-11-17 NOTE — PROGRESS NOTE ADULT - SUBJECTIVE AND OBJECTIVE BOX
Patient is a 89y old  Female who presents with a chief complaint of syncope (15 Nov 2019 16:07)      OVERNIGHT EVENTS:    MEDICATIONS  (STANDING):  aspirin enteric coated 81 milliGRAM(s) Oral daily  digoxin     Tablet 0.125 milliGRAM(s) Oral daily  diltiazem    Tablet 30 milliGRAM(s) Oral every 8 hours  furosemide    Tablet 40 milliGRAM(s) Oral daily  influenza   Vaccine 0.5 milliLiter(s) IntraMuscular once  levothyroxine 50 MICROGram(s) Oral daily  pantoprazole    Tablet 40 milliGRAM(s) Oral before breakfast  polyethylene glycol 3350 17 Gram(s) Oral daily  simvastatin 10 milliGRAM(s) Oral at bedtime    MEDICATIONS  (PRN):  acetaminophen   Tablet .. 650 milliGRAM(s) Oral every 6 hours PRN Temp greater or equal to 38C (100.4F), Mild Pain (1 - 3)  ondansetron Injectable 4 milliGRAM(s) IV Push every 6 hours PRN Nausea and/or Vomiting      Allergies    ciprofloxacin (Unknown)  Flagyl (Hives)    Intolerances        SUBJECTIVE: in bed in NAD, no acute events overnight     Vital Signs Last 24 Hrs  T(C): 36.1 (17 Nov 2019 04:50), Max: 36.9 (16 Nov 2019 17:02)  T(F): 97 (17 Nov 2019 04:50), Max: 98.5 (16 Nov 2019 17:02)  HR: 63 (17 Nov 2019 04:50) (63 - 99)  BP: 144/74 (17 Nov 2019 04:50) (103/70 - 158/77)  BP(mean): --  RR: 17 (17 Nov 2019 04:50) (17 - 19)  SpO2: 100% (17 Nov 2019 04:50) (99% - 100%)    PHYSICAL EXAM:  GENERAL: NAD, well-groomed, well-developed  HEAD:  Atraumatic, Normocephalic  EYES: EOMI, PERRLA, conjunctiva and sclera clear  ENMT: No tonsillar erythema, exudates, or enlargement; Moist mucous membranes, Good dentition, No lesions  NECK: Supple, No JVD, Normal thyroid  CHEST/LUNG: Clear to  auscultation bilaterally; No rales, rhonchi, wheezing, or rubs  bilaterally  HEART: Regular rate and rhythm; No murmurs, rubs, or gallops  ABDOMEN: Soft, Nontender, Nondistended; Bowel sounds present  EXTREMITIES:  2+ Peripheral Pulses, No clubbing, cyanosis, or edema BL LE  SKIN: No rashes or lesions  NERVOUS SYSTEM:  Alert & Oriented X3, Good concentration; Motor Strength 5/5 B/L upper and lower extremities;   DTRs 2+ intact and symmetric, sensation intact BL    LABS:                                 12.8   7.63  )-----------( 219      ( 17 Nov 2019 07:45 )             39.7   11-17    139  |  103  |  22  ----------------------------<  198<H>  3.9   |  30  |  1.09    Ca    9.0      17 Nov 2019 07:45  Phos  2.9     11-17  Mg     2.4     11-17    TPro  7.4  /  Alb  3.8  /  TBili  0.8  /  DBili  x   /  AST  30  /  ALT  14  /  AlkPhos  115  11-15      Cultures;   CAPILLARY BLOOD GLUCOSE        Lipid panel:     CARDIAC MARKERS ( 15 Nov 2019 13:23 )  <.015 ng/mL / x     / x     / x     / x            RADIOLOGY & ADDITIONAL TESTS:      Imaging Personally Reviewed:  [ x] YES      Consultant(s) Notes Reviewed:  [x ] YES     Care Discussed with [x ] Consultants [X ] Patient [x ] Family  [x ]    [x ]  Other; RN

## 2019-11-17 NOTE — PROGRESS NOTE ADULT - ASSESSMENT
89 yo with hith htn, gastric ulcer, hypothyroidism, HLD, CHF, ckg, DMII, chronic heart failure, systolic and diastolic heart failure, Afib with RVR, presents for evaluation of intermittent syncope and hypotension x6 days.

## 2019-11-17 NOTE — PROGRESS NOTE ADULT - ATTENDING COMMENTS
updated daughter  via phone she is requesting for pt to be transferred  to rehab/ nh in [place] for ltc updated daughter  via phone she is requesting for pt to be transferred  to rehab/ nh in [place] for ltc  [phone]

## 2019-11-17 NOTE — PROGRESS NOTE ADULT - PROBLEM SELECTOR PLAN 2
rate was uncontrolled  on 11/16/19 so  started digoxin  as I already had to lower cardizem due to orthostatic    no AC do to recurrent falls

## 2019-11-18 LAB
ANION GAP SERPL CALC-SCNC: 4 MMOL/L — LOW (ref 5–17)
BUN SERPL-MCNC: 21 MG/DL — SIGNIFICANT CHANGE UP (ref 7–23)
CALCIUM SERPL-MCNC: 8.9 MG/DL — SIGNIFICANT CHANGE UP (ref 8.5–10.1)
CHLORIDE SERPL-SCNC: 103 MMOL/L — SIGNIFICANT CHANGE UP (ref 96–108)
CO2 SERPL-SCNC: 31 MMOL/L — SIGNIFICANT CHANGE UP (ref 22–31)
CREAT SERPL-MCNC: 1.06 MG/DL — SIGNIFICANT CHANGE UP (ref 0.5–1.3)
DIGOXIN SERPL-MCNC: 1.58 NG/ML — SIGNIFICANT CHANGE UP (ref 0.8–2)
GLUCOSE SERPL-MCNC: 194 MG/DL — HIGH (ref 70–99)
HCT VFR BLD CALC: 41.5 % — SIGNIFICANT CHANGE UP (ref 34.5–45)
HGB BLD-MCNC: 13.1 G/DL — SIGNIFICANT CHANGE UP (ref 11.5–15.5)
MAGNESIUM SERPL-MCNC: 2.4 MG/DL — SIGNIFICANT CHANGE UP (ref 1.6–2.6)
MCHC RBC-ENTMCNC: 30.3 PG — SIGNIFICANT CHANGE UP (ref 27–34)
MCHC RBC-ENTMCNC: 31.6 GM/DL — LOW (ref 32–36)
MCV RBC AUTO: 95.8 FL — SIGNIFICANT CHANGE UP (ref 80–100)
NRBC # BLD: 0 /100 WBCS — SIGNIFICANT CHANGE UP (ref 0–0)
PHOSPHATE SERPL-MCNC: 2.8 MG/DL — SIGNIFICANT CHANGE UP (ref 2.5–4.5)
PLATELET # BLD AUTO: 228 K/UL — SIGNIFICANT CHANGE UP (ref 150–400)
POTASSIUM SERPL-MCNC: 3.9 MMOL/L — SIGNIFICANT CHANGE UP (ref 3.5–5.3)
POTASSIUM SERPL-SCNC: 3.9 MMOL/L — SIGNIFICANT CHANGE UP (ref 3.5–5.3)
RBC # BLD: 4.33 M/UL — SIGNIFICANT CHANGE UP (ref 3.8–5.2)
RBC # FLD: 13.8 % — SIGNIFICANT CHANGE UP (ref 10.3–14.5)
SODIUM SERPL-SCNC: 138 MMOL/L — SIGNIFICANT CHANGE UP (ref 135–145)
WBC # BLD: 8.36 K/UL — SIGNIFICANT CHANGE UP (ref 3.8–10.5)
WBC # FLD AUTO: 8.36 K/UL — SIGNIFICANT CHANGE UP (ref 3.8–10.5)

## 2019-11-18 PROCEDURE — 99232 SBSQ HOSP IP/OBS MODERATE 35: CPT

## 2019-11-18 RX ADMIN — Medication 0.12 MILLIGRAM(S): at 06:23

## 2019-11-18 RX ADMIN — Medication 30 MILLIGRAM(S): at 13:19

## 2019-11-18 RX ADMIN — SIMVASTATIN 10 MILLIGRAM(S): 20 TABLET, FILM COATED ORAL at 21:38

## 2019-11-18 RX ADMIN — Medication 30 MILLIGRAM(S): at 06:24

## 2019-11-18 RX ADMIN — Medication 30 MILLIGRAM(S): at 21:38

## 2019-11-18 RX ADMIN — Medication 50 MICROGRAM(S): at 06:23

## 2019-11-18 RX ADMIN — PANTOPRAZOLE SODIUM 40 MILLIGRAM(S): 20 TABLET, DELAYED RELEASE ORAL at 06:23

## 2019-11-18 RX ADMIN — Medication 81 MILLIGRAM(S): at 11:06

## 2019-11-18 RX ADMIN — Medication 20 MILLIGRAM(S): at 06:23

## 2019-11-18 NOTE — PROGRESS NOTE ADULT - ATTENDING COMMENTS
updated daughter  via phone she is requesting for pt to be transferred  to rehab/ nh in [place] for [facility]  [phone]    11/18/19 d/w sw to start d/c palnning

## 2019-11-18 NOTE — PROGRESS NOTE ADULT - PROBLEM SELECTOR PLAN 1
presumed due to Hypotension with symptoms:   fall precautions   adjusted bp meds   checked orthostatics on 11/16/19 and was positive

## 2019-11-18 NOTE — PROGRESS NOTE ADULT - SUBJECTIVE AND OBJECTIVE BOX
Patient is a 89y old  Female who presents with a chief complaint of syncope (15 Nov 2019 16:07)      OVERNIGHT EVENTS:    MEDICATIONS  (STANDING):  aspirin enteric coated 81 milliGRAM(s) Oral daily  digoxin     Tablet 0.125 milliGRAM(s) Oral daily  diltiazem    Tablet 30 milliGRAM(s) Oral every 8 hours  furosemide    Tablet 20 milliGRAM(s) Oral daily  influenza   Vaccine 0.5 milliLiter(s) IntraMuscular once  levothyroxine 50 MICROGram(s) Oral daily  pantoprazole    Tablet 40 milliGRAM(s) Oral before breakfast  simvastatin 10 milliGRAM(s) Oral at bedtime    MEDICATIONS  (PRN):  acetaminophen   Tablet .. 650 milliGRAM(s) Oral every 6 hours PRN Temp greater or equal to 38C (100.4F), Mild Pain (1 - 3)  ondansetron Injectable 4 milliGRAM(s) IV Push every 6 hours PRN Nausea and/or Vomiting  polyethylene glycol 3350 17 Gram(s) Oral daily PRN Constipation      Allergies    ciprofloxacin (Unknown)  Flagyl (Hives)    Intolerances        SUBJECTIVE: in bed in NAD, no acute events overnight     Vital Signs Last 24 Hrs  T(C): 36.3 (18 Nov 2019 05:09), Max: 36.9 (17 Nov 2019 23:48)  T(F): 97.4 (18 Nov 2019 05:09), Max: 98.5 (17 Nov 2019 23:48)  HR: 82 (18 Nov 2019 05:09) (80 - 97)  BP: 122/74 (18 Nov 2019 05:09) (122/68 - 134/74)  BP(mean): --  RR: 18 (18 Nov 2019 05:09) (17 - 18)  SpO2: 98% (18 Nov 2019 05:09) (98% - 100%)      PHYSICAL EXAM:  GENERAL: NAD, well-groomed, well-developed  HEAD:  Atraumatic, Normocephalic  EYES: EOMI, PERRLA, conjunctiva and sclera clear  ENMT: No tonsillar erythema, exudates, or enlargement; Moist mucous membranes, Good dentition, No lesions  NECK: Supple, No JVD, Normal thyroid  CHEST/LUNG: Clear to  auscultation bilaterally; No rales, rhonchi, wheezing, or rubs  bilaterally  HEART: Regular rate and rhythm; No murmurs, rubs, or gallops  ABDOMEN: Soft, Nontender, Nondistended; Bowel sounds present  EXTREMITIES:  2+ Peripheral Pulses, No clubbing, cyanosis, or edema BL LE  SKIN: No rashes or lesions  NERVOUS SYSTEM:  Alert & Oriented X3, Good concentration; Motor Strength 5/5 B/L upper and lower extremities;   DTRs 2+ intact and symmetric, sensation intact BL    LABS:                            13.1   8.36  )-----------( 228      ( 18 Nov 2019 08:02 )             41.5     11-18    138  |  103  |  21  ----------------------------<  194<H>  3.9   |  31  |  1.06    Ca    8.9      18 Nov 2019 08:02  Phos  2.8     11-18  Mg     2.4     11-18        Cultures;   CAPILLARY BLOOD GLUCOSE        Lipid panel:     CARDIAC MARKERS ( 15 Nov 2019 13:23 )  <.015 ng/mL / x     / x     / x     / x            RADIOLOGY & ADDITIONAL TESTS:      Imaging Personally Reviewed:  [ x] YES      Consultant(s) Notes Reviewed:  [x ] YES     Care Discussed with [x ] Consultants [X ] Patient [x ] Family  [x ]    [x ]  Other; RN

## 2019-11-18 NOTE — PROGRESS NOTE ADULT - PROBLEM SELECTOR PLAN 2
rate was uncontrolled  on 11/16/19 so  started digoxin  as I already had to lower cardizem due to orthostatic    no AC do to recurrent falls  Digoxin level wnl

## 2019-11-19 ENCOUNTER — TRANSCRIPTION ENCOUNTER (OUTPATIENT)
Age: 84
End: 2019-11-19

## 2019-11-19 VITALS
HEART RATE: 81 BPM | RESPIRATION RATE: 18 BRPM | OXYGEN SATURATION: 98 % | TEMPERATURE: 98 F | SYSTOLIC BLOOD PRESSURE: 139 MMHG | DIASTOLIC BLOOD PRESSURE: 80 MMHG

## 2019-11-19 PROCEDURE — 99239 HOSP IP/OBS DSCHRG MGMT >30: CPT

## 2019-11-19 RX ORDER — DIGOXIN 250 MCG
1 TABLET ORAL
Qty: 0 | Refills: 0 | DISCHARGE
Start: 2019-11-19

## 2019-11-19 RX ORDER — POLYETHYLENE GLYCOL 3350 17 G/17G
17 POWDER, FOR SOLUTION ORAL
Qty: 0 | Refills: 0 | DISCHARGE
Start: 2019-11-19

## 2019-11-19 RX ORDER — FUROSEMIDE 40 MG
1 TABLET ORAL
Qty: 0 | Refills: 0 | DISCHARGE
Start: 2019-11-19

## 2019-11-19 RX ORDER — ASPIRIN/CALCIUM CARB/MAGNESIUM 324 MG
1 TABLET ORAL
Qty: 0 | Refills: 0 | DISCHARGE
Start: 2019-11-19

## 2019-11-19 RX ORDER — DILTIAZEM HCL 120 MG
1 CAPSULE, EXT RELEASE 24 HR ORAL
Qty: 0 | Refills: 0 | DISCHARGE
Start: 2019-11-19

## 2019-11-19 RX ORDER — LEVOTHYROXINE SODIUM 125 MCG
1 TABLET ORAL
Qty: 0 | Refills: 0 | DISCHARGE
Start: 2019-11-19

## 2019-11-19 RX ORDER — ACETAMINOPHEN 500 MG
2 TABLET ORAL
Qty: 0 | Refills: 0 | DISCHARGE
Start: 2019-11-19

## 2019-11-19 RX ORDER — SIMVASTATIN 20 MG/1
1 TABLET, FILM COATED ORAL
Qty: 0 | Refills: 0 | DISCHARGE
Start: 2019-11-19

## 2019-11-19 RX ADMIN — Medication 30 MILLIGRAM(S): at 06:11

## 2019-11-19 RX ADMIN — Medication 20 MILLIGRAM(S): at 06:11

## 2019-11-19 RX ADMIN — Medication 81 MILLIGRAM(S): at 11:39

## 2019-11-19 RX ADMIN — Medication 50 MICROGRAM(S): at 06:11

## 2019-11-19 RX ADMIN — Medication 0.12 MILLIGRAM(S): at 06:11

## 2019-11-19 RX ADMIN — PANTOPRAZOLE SODIUM 40 MILLIGRAM(S): 20 TABLET, DELAYED RELEASE ORAL at 06:11

## 2019-11-19 NOTE — DISCHARGE NOTE PROVIDER - NSDCCPCAREPLAN_GEN_ALL_CORE_FT
PRINCIPAL DISCHARGE DIAGNOSIS  Diagnosis: Syncope  Assessment and Plan of Treatment:       SECONDARY DISCHARGE DIAGNOSES  Diagnosis: H/O gastric ulcer  Assessment and Plan of Treatment: H/O gastric ulcer    Diagnosis: Chronic combined systolic and diastolic congestive heart failure  Assessment and Plan of Treatment: Chronic combined systolic and diastolic congestive heart failure    Diagnosis: Fecal impaction in rectum  Assessment and Plan of Treatment: Fecal impaction in rectum    Diagnosis: Hypothyroidism, unspecified type  Assessment and Plan of Treatment: Hypothyroidism, unspecified type    Diagnosis: Essential hypertension  Assessment and Plan of Treatment: Essential hypertension    Diagnosis: Dyslipidemia  Assessment and Plan of Treatment: Dyslipidemia    Diagnosis: Atrial fibrillation  Assessment and Plan of Treatment:

## 2019-11-19 NOTE — DISCHARGE NOTE NURSING/CASE MANAGEMENT/SOCIAL WORK - NSDCVIVACCINE_GEN_ALL_CORE_FT
Tdap , 2015/1/17 17:16 , [place], Auto-process (IS)  Tdap , 2018/11/9 08:30 , Ana Jones) Tdap , 2015/1/17 17:16 , [place], Auto-process (IS)  Tdap , 2018/11/9 08:30 , Ana Jones)

## 2019-11-19 NOTE — DISCHARGE NOTE PROVIDER - NSDCMRMEDTOKEN_GEN_ALL_CORE_FT
acetaminophen 325 mg oral tablet: 2 tab(s) orally every 6 hours, As needed, Temp greater or equal to 38C (100.4F), Mild Pain (1 - 3)  aspirin 81 mg oral delayed release tablet: 1 tab(s) orally once a day  digoxin 125 mcg (0.125 mg) oral tablet: 1 tab(s) orally once a day  dilTIAZem 30 mg oral tablet: 1 tab(s) orally every 8 hours  furosemide 20 mg oral tablet: 1 tab(s) orally once a day  glimepiride 4 mg oral tablet: 1 tab(s) orally once a day  levothyroxine 50 mcg (0.05 mg) oral tablet: 1 tab(s) orally once a day  omeprazole 20 mg oral delayed release tablet: 1 tab(s) orally once a day  polyethylene glycol 3350 oral powder for reconstitution: 17 gram(s) orally once a day, As needed, Constipation  simvastatin 10 mg oral tablet: 1 tab(s) orally once a day (at bedtime)

## 2019-11-19 NOTE — DISCHARGE NOTE PROVIDER - HOSPITAL COURSE
89 yo with hith htn, gastric ulcer, hypothyroidism, HLD, CHF, ckg, DMII, chronic heart failure, systolic and diastolic heart failure, Afib with RVR, presents for evaluation of intermittent syncope and hypotension x6 days. Patient poor historian. Per Ed attending pt was at [facility] in day and had syncopal episode jason was in 70s systolic . joe in ed bp has normalized . ? orthostatic Hypotension with symptoms    In hospital pt admitted to telemetry unit, bp meds adjusted. For discharge to rehab for Long Term care. 89 yo with hith htn, gastric ulcer, hypothyroidism, HLD, CHF, ckg, DMII, chronic heart failure, systolic and diastolic heart failure, Afib with RVR, presents for evaluation of intermittent syncope and hypotension x6 days. Patient poor historian. Per Ed attending pt was at [facility] in day and had syncopal episode jason was in 70s systolic . joe in ed bp has normalized . ? orthostatic Hypotension with symptoms    In hospital pt admitted to telemetry unit, bp meds adjusted. For discharge to rehab for Long Term care.     TIME SPENT COMPLETING DISCHARGE AND COORDINATING CARE WITH NURSING,  AND CASE MANAGEMENT APPROX 40MINUTES

## 2019-11-19 NOTE — DISCHARGE NOTE NURSING/CASE MANAGEMENT/SOCIAL WORK - PATIENT PORTAL LINK FT
You can access the FollowMyHealth Patient Portal offered by [facility] by registering at the following website: [email or web address]. By joining [employer]’s FollowMyHealth portal, you will also be able to view your health information using other applications (apps) compatible with our system.

## 2019-11-20 ENCOUNTER — OUTPATIENT (OUTPATIENT)
Dept: OUTPATIENT SERVICES | Facility: HOSPITAL | Age: 84
LOS: 1 days | Discharge: HOME | End: 2019-11-20

## 2019-11-20 DIAGNOSIS — R79.9 ABNORMAL FINDING OF BLOOD CHEMISTRY, UNSPECIFIED: ICD-10-CM

## 2019-11-20 DIAGNOSIS — Z98.890 OTHER SPECIFIED POSTPROCEDURAL STATES: Chronic | ICD-10-CM

## 2019-11-20 DIAGNOSIS — D64.9 ANEMIA, UNSPECIFIED: ICD-10-CM

## 2019-11-20 DIAGNOSIS — Z51.81 ENCOUNTER FOR THERAPEUTIC DRUG LEVEL MONITORING: ICD-10-CM

## 2019-11-20 PROBLEM — I48.91 UNSPECIFIED ATRIAL FIBRILLATION: Chronic | Status: ACTIVE | Noted: 2019-11-15

## 2019-11-20 LAB
CULTURE RESULTS: SIGNIFICANT CHANGE UP
CULTURE RESULTS: SIGNIFICANT CHANGE UP
SPECIMEN SOURCE: SIGNIFICANT CHANGE UP
SPECIMEN SOURCE: SIGNIFICANT CHANGE UP

## 2019-11-25 DIAGNOSIS — Z79.4 LONG TERM (CURRENT) USE OF INSULIN: ICD-10-CM

## 2019-11-25 DIAGNOSIS — E78.5 HYPERLIPIDEMIA, UNSPECIFIED: ICD-10-CM

## 2019-11-25 DIAGNOSIS — K25.9 GASTRIC ULCER, UNSPECIFIED AS ACUTE OR CHRONIC, WITHOUT HEMORRHAGE OR PERFORATION: ICD-10-CM

## 2019-11-25 DIAGNOSIS — E11.9 TYPE 2 DIABETES MELLITUS WITHOUT COMPLICATIONS: ICD-10-CM

## 2019-11-25 DIAGNOSIS — K56.41 FECAL IMPACTION: ICD-10-CM

## 2019-11-25 DIAGNOSIS — E03.9 HYPOTHYROIDISM, UNSPECIFIED: ICD-10-CM

## 2019-11-25 DIAGNOSIS — R55 SYNCOPE AND COLLAPSE: ICD-10-CM

## 2019-11-25 DIAGNOSIS — Z88.1 ALLERGY STATUS TO OTHER ANTIBIOTIC AGENTS STATUS: ICD-10-CM

## 2019-11-25 DIAGNOSIS — I11.0 HYPERTENSIVE HEART DISEASE WITH HEART FAILURE: ICD-10-CM

## 2019-11-25 DIAGNOSIS — I95.1 ORTHOSTATIC HYPOTENSION: ICD-10-CM

## 2019-11-25 DIAGNOSIS — I48.91 UNSPECIFIED ATRIAL FIBRILLATION: ICD-10-CM

## 2019-11-25 DIAGNOSIS — E11.51 TYPE 2 DIABETES MELLITUS WITH DIABETIC PERIPHERAL ANGIOPATHY WITHOUT GANGRENE: ICD-10-CM

## 2019-11-25 DIAGNOSIS — I50.42 CHRONIC COMBINED SYSTOLIC (CONGESTIVE) AND DIASTOLIC (CONGESTIVE) HEART FAILURE: ICD-10-CM

## 2019-11-25 DIAGNOSIS — Z79.82 LONG TERM (CURRENT) USE OF ASPIRIN: ICD-10-CM

## 2020-01-09 ENCOUNTER — INPATIENT (INPATIENT)
Facility: HOSPITAL | Age: 85
LOS: 7 days | Discharge: NURSING, REHAB OR LONG-TERM CARE | End: 2020-01-17
Attending: HOSPITALIST | Admitting: HOSPITALIST
Payer: MEDICARE

## 2020-01-09 VITALS
HEART RATE: 75 BPM | OXYGEN SATURATION: 100 % | DIASTOLIC BLOOD PRESSURE: 63 MMHG | SYSTOLIC BLOOD PRESSURE: 136 MMHG | TEMPERATURE: 97 F | RESPIRATION RATE: 18 BRPM

## 2020-01-09 DIAGNOSIS — Z98.890 OTHER SPECIFIED POSTPROCEDURAL STATES: Chronic | ICD-10-CM

## 2020-01-09 PROCEDURE — 93010 ELECTROCARDIOGRAM REPORT: CPT

## 2020-01-09 PROCEDURE — 99285 EMERGENCY DEPT VISIT HI MDM: CPT

## 2020-01-09 NOTE — ED PROVIDER NOTE - OBJECTIVE STATEMENT
Pt is a 89 y/o female with PMH of HTN, gastric ulcer, hypothyroidism, HLD, CHF, DMII, chronic heart failure, systolic and diastolic heart failure, Afib with RVR, presents to ED for abd pain, moderate, diffuse, worse with palpation. + nausea, constipation. No fever, vomiting. Limited hx due to pt being poor historian. Pt is a 89 y/o female with PMH of HTN, gastric ulcer, hypothyroidism, HLD, CHF, DMII, chronic heart failure, systolic and diastolic heart failure, Afib with RVR, presents to ED for abd pain, moderate, diffuse, worse with palpation. + nausea. Last BM this morning. No fever, vomiting. Limited hx due to pt being poor historian. Pt currently on macrobid for UTI. DNR/DNI

## 2020-01-09 NOTE — ED PROVIDER NOTE - SECONDARY DIAGNOSIS.
Constipation Elevated lactic acid level Elevated troponin Urinary retention OTTO (acute kidney injury)

## 2020-01-09 NOTE — ED PROVIDER NOTE - CLINICAL SUMMARY MEDICAL DECISION MAKING FREE TEXT BOX
Pt with multiple medical problems, DNR/DNI presented to ED with abd pain, AMS. Pt found to be febrile. Labs, imaging including CT, UA obtained. Fluids and abx given. Pt with increasing lactate. Discussed with ICU, states pt stable for floor. Pt with urinary retention on CT scan, liang placed with large amount of urine outpt. Pt with large fecal load, + BM in ED and additional fleet enema ordered. Pt endorsed to medical resident.

## 2020-01-09 NOTE — ED PROVIDER NOTE - PHYSICAL EXAMINATION
Constitutional: Well developed, well nourished. NAD.  Head: Normocephalic, atraumatic.  Eyes: PERRL. EOMI.  ENT: No nasal discharge. Mucous membranes dry.  Neck: Supple. Painless ROM.  Cardiovascular: Normal S1, S2. Irregularly irregular. No murmurs, rubs, or gallops.  Pulmonary: Normal respiratory rate and effort. Lungs clear to auscultation bilaterally. No wheezing, rales, or rhonchi.  Abdominal: Soft. Diffuse tender.  Extremities. Pelvis stable. No lower extremity edema, symmetric calves.  Skin: No rashes, cyanosis.  Neuro: AAOx3. No focal neurological deficits.  Psych: Normal mood. Normal affect.

## 2020-01-09 NOTE — ED PROVIDER NOTE - NS ED ROS FT
Constitutional: No fever, chills.  Eyes: No visual changes.  ENT: No hearing changes. No sore throat.  Neck: No neck pain or stiffness.  Cardiovascular: No chest pain, palpitations, edema.  Pulmonary: No SOB, cough. No hemoptysis.  Abdominal: +abdominal pain, nausea.  : No dysuria, frequency.  Neuro: No headache, syncope, dizziness.  MS: No back pain. No calf pain/swelling.  Psych: No suicidal ideations.

## 2020-01-09 NOTE — ED PROVIDER NOTE - CARE PLAN
Principal Discharge DX:	Sepsis  Secondary Diagnosis:	Constipation  Secondary Diagnosis:	Elevated lactic acid level  Secondary Diagnosis:	Elevated troponin  Secondary Diagnosis:	OTTO (acute kidney injury)  Secondary Diagnosis:	Urinary retention

## 2020-01-09 NOTE — ED PROVIDER NOTE - PROGRESS NOTE DETAILS
PODLOG: Sepsis labs ordered. abx given. Unable to pass liang in the ED, discussed with urology, will see pt.

## 2020-01-10 LAB
ALBUMIN SERPL ELPH-MCNC: 3.2 G/DL — LOW (ref 3.5–5.2)
ALBUMIN SERPL ELPH-MCNC: 3.5 G/DL — SIGNIFICANT CHANGE UP (ref 3.5–5.2)
ALP SERPL-CCNC: 111 U/L — SIGNIFICANT CHANGE UP (ref 30–115)
ALP SERPL-CCNC: 113 U/L — SIGNIFICANT CHANGE UP (ref 30–115)
ALT FLD-CCNC: 18 U/L — SIGNIFICANT CHANGE UP (ref 0–41)
ALT FLD-CCNC: 27 U/L — SIGNIFICANT CHANGE UP (ref 0–41)
ANION GAP SERPL CALC-SCNC: 24 MMOL/L — HIGH (ref 7–14)
ANION GAP SERPL CALC-SCNC: 30 MMOL/L — HIGH (ref 7–14)
APPEARANCE UR: CLEAR — SIGNIFICANT CHANGE UP
APTT BLD: 27.1 SEC — SIGNIFICANT CHANGE UP (ref 27–39.2)
AST SERPL-CCNC: 46 U/L — HIGH (ref 0–41)
AST SERPL-CCNC: 61 U/L — HIGH (ref 0–41)
B-OH-BUTYR SERPL-SCNC: 0.9 MMOL/L — HIGH
BACTERIA # UR AUTO: NEGATIVE — SIGNIFICANT CHANGE UP
BASE EXCESS BLDV CALC-SCNC: -1.1 MMOL/L — SIGNIFICANT CHANGE UP (ref -2–2)
BASE EXCESS BLDV CALC-SCNC: -4.2 MMOL/L — LOW (ref -2–2)
BASE EXCESS BLDV CALC-SCNC: -4.4 MMOL/L — LOW (ref -2–2)
BASOPHILS # BLD AUTO: 0.05 K/UL — SIGNIFICANT CHANGE UP (ref 0–0.2)
BASOPHILS # BLD AUTO: 0.05 K/UL — SIGNIFICANT CHANGE UP (ref 0–0.2)
BASOPHILS NFR BLD AUTO: 0.2 % — SIGNIFICANT CHANGE UP (ref 0–1)
BASOPHILS NFR BLD AUTO: 0.2 % — SIGNIFICANT CHANGE UP (ref 0–1)
BILIRUB SERPL-MCNC: 0.5 MG/DL — SIGNIFICANT CHANGE UP (ref 0.2–1.2)
BILIRUB SERPL-MCNC: 0.7 MG/DL — SIGNIFICANT CHANGE UP (ref 0.2–1.2)
BILIRUB UR-MCNC: NEGATIVE — SIGNIFICANT CHANGE UP
BLD GP AB SCN SERPL QL: SIGNIFICANT CHANGE UP
BUN SERPL-MCNC: 49 MG/DL — HIGH (ref 10–20)
BUN SERPL-MCNC: 51 MG/DL — HIGH (ref 10–20)
CA-I SERPL-SCNC: 1.08 MMOL/L — LOW (ref 1.12–1.3)
CA-I SERPL-SCNC: 1.1 MMOL/L — LOW (ref 1.12–1.3)
CA-I SERPL-SCNC: 1.13 MMOL/L — SIGNIFICANT CHANGE UP (ref 1.12–1.3)
CALCIUM SERPL-MCNC: 8.5 MG/DL — SIGNIFICANT CHANGE UP (ref 8.5–10.1)
CALCIUM SERPL-MCNC: 8.8 MG/DL — SIGNIFICANT CHANGE UP (ref 8.5–10.1)
CHLORIDE SERPL-SCNC: 95 MMOL/L — LOW (ref 98–110)
CHLORIDE SERPL-SCNC: 96 MMOL/L — LOW (ref 98–110)
CO2 SERPL-SCNC: 14 MMOL/L — LOW (ref 17–32)
CO2 SERPL-SCNC: 20 MMOL/L — SIGNIFICANT CHANGE UP (ref 17–32)
COLOR SPEC: YELLOW — SIGNIFICANT CHANGE UP
CREAT SERPL-MCNC: 1.8 MG/DL — HIGH (ref 0.7–1.5)
CREAT SERPL-MCNC: 2.1 MG/DL — HIGH (ref 0.7–1.5)
DIFF PNL FLD: NEGATIVE — SIGNIFICANT CHANGE UP
EOSINOPHIL # BLD AUTO: 0 K/UL — SIGNIFICANT CHANGE UP (ref 0–0.7)
EOSINOPHIL # BLD AUTO: 0 K/UL — SIGNIFICANT CHANGE UP (ref 0–0.7)
EOSINOPHIL NFR BLD AUTO: 0 % — SIGNIFICANT CHANGE UP (ref 0–8)
EOSINOPHIL NFR BLD AUTO: 0 % — SIGNIFICANT CHANGE UP (ref 0–8)
EPI CELLS # UR: 0 /HPF — SIGNIFICANT CHANGE UP (ref 0–5)
GAS PNL BLDV: 135 MMOL/L — LOW (ref 136–145)
GAS PNL BLDV: 135 MMOL/L — LOW (ref 136–145)
GAS PNL BLDV: 138 MMOL/L — SIGNIFICANT CHANGE UP (ref 136–145)
GAS PNL BLDV: SIGNIFICANT CHANGE UP
GLUCOSE BLDC GLUCOMTR-MCNC: 348 MG/DL — HIGH (ref 70–99)
GLUCOSE BLDC GLUCOMTR-MCNC: 453 MG/DL — CRITICAL HIGH (ref 70–99)
GLUCOSE BLDC GLUCOMTR-MCNC: 77 MG/DL — SIGNIFICANT CHANGE UP (ref 70–99)
GLUCOSE BLDC GLUCOMTR-MCNC: 87 MG/DL — SIGNIFICANT CHANGE UP (ref 70–99)
GLUCOSE SERPL-MCNC: 330 MG/DL — HIGH (ref 70–99)
GLUCOSE SERPL-MCNC: 482 MG/DL — CRITICAL HIGH (ref 70–99)
GLUCOSE UR QL: ABNORMAL
HCO3 BLDV-SCNC: 21 MMOL/L — LOW (ref 22–29)
HCO3 BLDV-SCNC: 22 MMOL/L — SIGNIFICANT CHANGE UP (ref 22–29)
HCO3 BLDV-SCNC: 23 MMOL/L — SIGNIFICANT CHANGE UP (ref 22–29)
HCT VFR BLD CALC: 32.8 % — LOW (ref 37–47)
HCT VFR BLD CALC: 33.4 % — LOW (ref 37–47)
HCT VFR BLDA CALC: 22.7 % — LOW (ref 34–44)
HCT VFR BLDA CALC: 30.7 % — LOW (ref 34–44)
HCT VFR BLDA CALC: 32.8 % — LOW (ref 34–44)
HGB BLD CALC-MCNC: 10 G/DL — LOW (ref 14–18)
HGB BLD CALC-MCNC: 10.7 G/DL — LOW (ref 14–18)
HGB BLD CALC-MCNC: 7.4 G/DL — LOW (ref 14–18)
HGB BLD-MCNC: 10.2 G/DL — LOW (ref 12–16)
HGB BLD-MCNC: 10.6 G/DL — LOW (ref 12–16)
HYALINE CASTS # UR AUTO: 2 /LPF — SIGNIFICANT CHANGE UP (ref 0–7)
IMM GRANULOCYTES NFR BLD AUTO: 1.5 % — HIGH (ref 0.1–0.3)
IMM GRANULOCYTES NFR BLD AUTO: 1.5 % — HIGH (ref 0.1–0.3)
INR BLD: 1.35 RATIO — HIGH (ref 0.65–1.3)
KETONES UR-MCNC: NEGATIVE — SIGNIFICANT CHANGE UP
LACTATE BLDV-MCNC: 4.6 MMOL/L — HIGH (ref 0.5–1.6)
LACTATE BLDV-MCNC: 6.2 MMOL/L — HIGH (ref 0.5–1.6)
LACTATE BLDV-MCNC: 6.8 MMOL/L — HIGH (ref 0.5–1.6)
LACTATE SERPL-SCNC: 8.5 MMOL/L — CRITICAL HIGH (ref 0.7–2)
LEUKOCYTE ESTERASE UR-ACNC: NEGATIVE — SIGNIFICANT CHANGE UP
LIDOCAIN IGE QN: 7 U/L — SIGNIFICANT CHANGE UP (ref 7–60)
LYMPHOCYTES # BLD AUTO: 0.92 K/UL — LOW (ref 1.2–3.4)
LYMPHOCYTES # BLD AUTO: 2.07 K/UL — SIGNIFICANT CHANGE UP (ref 1.2–3.4)
LYMPHOCYTES # BLD AUTO: 3.8 % — LOW (ref 20.5–51.1)
LYMPHOCYTES # BLD AUTO: 8.4 % — LOW (ref 20.5–51.1)
MAGNESIUM SERPL-MCNC: 2.5 MG/DL — HIGH (ref 1.8–2.4)
MCHC RBC-ENTMCNC: 29.8 PG — SIGNIFICANT CHANGE UP (ref 27–31)
MCHC RBC-ENTMCNC: 29.9 PG — SIGNIFICANT CHANGE UP (ref 27–31)
MCHC RBC-ENTMCNC: 31.1 G/DL — LOW (ref 32–37)
MCHC RBC-ENTMCNC: 31.7 G/DL — LOW (ref 32–37)
MCV RBC AUTO: 94.4 FL — SIGNIFICANT CHANGE UP (ref 81–99)
MCV RBC AUTO: 95.9 FL — SIGNIFICANT CHANGE UP (ref 81–99)
MONOCYTES # BLD AUTO: 1.35 K/UL — HIGH (ref 0.1–0.6)
MONOCYTES # BLD AUTO: 1.42 K/UL — HIGH (ref 0.1–0.6)
MONOCYTES NFR BLD AUTO: 5.6 % — SIGNIFICANT CHANGE UP (ref 1.7–9.3)
MONOCYTES NFR BLD AUTO: 5.7 % — SIGNIFICANT CHANGE UP (ref 1.7–9.3)
MRSA PCR RESULT.: NEGATIVE — SIGNIFICANT CHANGE UP
NEUTROPHILS # BLD AUTO: 20.81 K/UL — HIGH (ref 1.4–6.5)
NEUTROPHILS # BLD AUTO: 21.57 K/UL — HIGH (ref 1.4–6.5)
NEUTROPHILS NFR BLD AUTO: 84.2 % — HIGH (ref 42.2–75.2)
NEUTROPHILS NFR BLD AUTO: 88.9 % — HIGH (ref 42.2–75.2)
NITRITE UR-MCNC: NEGATIVE — SIGNIFICANT CHANGE UP
NRBC # BLD: 0 /100 WBCS — SIGNIFICANT CHANGE UP (ref 0–0)
NRBC # BLD: 0 /100 WBCS — SIGNIFICANT CHANGE UP (ref 0–0)
PCO2 BLDV: 36 MMHG — LOW (ref 41–51)
PCO2 BLDV: 38 MMHG — LOW (ref 41–51)
PCO2 BLDV: 42 MMHG — SIGNIFICANT CHANGE UP (ref 41–51)
PH BLDV: 7.32 — SIGNIFICANT CHANGE UP (ref 7.26–7.43)
PH BLDV: 7.35 — SIGNIFICANT CHANGE UP (ref 7.26–7.43)
PH BLDV: 7.41 — SIGNIFICANT CHANGE UP (ref 7.26–7.43)
PH UR: 5.5 — SIGNIFICANT CHANGE UP (ref 5–8)
PLATELET # BLD AUTO: 258 K/UL — SIGNIFICANT CHANGE UP (ref 130–400)
PLATELET # BLD AUTO: 287 K/UL — SIGNIFICANT CHANGE UP (ref 130–400)
PO2 BLDV: 20 MMHG — SIGNIFICANT CHANGE UP (ref 20–40)
PO2 BLDV: 27 MMHG — SIGNIFICANT CHANGE UP (ref 20–40)
PO2 BLDV: 32 MMHG — SIGNIFICANT CHANGE UP (ref 20–40)
POTASSIUM BLDV-SCNC: 4 MMOL/L — SIGNIFICANT CHANGE UP (ref 3.3–5.6)
POTASSIUM BLDV-SCNC: 4.2 MMOL/L — SIGNIFICANT CHANGE UP (ref 3.3–5.6)
POTASSIUM BLDV-SCNC: 4.3 MMOL/L — SIGNIFICANT CHANGE UP (ref 3.3–5.6)
POTASSIUM SERPL-MCNC: 4.5 MMOL/L — SIGNIFICANT CHANGE UP (ref 3.5–5)
POTASSIUM SERPL-MCNC: 4.7 MMOL/L — SIGNIFICANT CHANGE UP (ref 3.5–5)
POTASSIUM SERPL-SCNC: 4.5 MMOL/L — SIGNIFICANT CHANGE UP (ref 3.5–5)
POTASSIUM SERPL-SCNC: 4.7 MMOL/L — SIGNIFICANT CHANGE UP (ref 3.5–5)
PROT SERPL-MCNC: 5.7 G/DL — LOW (ref 6–8)
PROT SERPL-MCNC: 6.2 G/DL — SIGNIFICANT CHANGE UP (ref 6–8)
PROT UR-MCNC: ABNORMAL
PROTHROM AB SERPL-ACNC: 15.5 SEC — HIGH (ref 9.95–12.87)
RBC # BLD: 3.42 M/UL — LOW (ref 4.2–5.4)
RBC # BLD: 3.54 M/UL — LOW (ref 4.2–5.4)
RBC # FLD: 14.5 % — SIGNIFICANT CHANGE UP (ref 11.5–14.5)
RBC # FLD: 14.6 % — HIGH (ref 11.5–14.5)
RBC CASTS # UR COMP ASSIST: 3 /HPF — SIGNIFICANT CHANGE UP (ref 0–4)
SAO2 % BLDV: 22 % — SIGNIFICANT CHANGE UP
SAO2 % BLDV: 37 % — SIGNIFICANT CHANGE UP
SAO2 % BLDV: 55 % — SIGNIFICANT CHANGE UP
SODIUM SERPL-SCNC: 139 MMOL/L — SIGNIFICANT CHANGE UP (ref 135–146)
SODIUM SERPL-SCNC: 140 MMOL/L — SIGNIFICANT CHANGE UP (ref 135–146)
SP GR SPEC: 1.03 — HIGH (ref 1.01–1.02)
TROPONIN T SERPL-MCNC: 0.18 NG/ML — CRITICAL HIGH
TROPONIN T SERPL-MCNC: 0.2 NG/ML — CRITICAL HIGH
UROBILINOGEN FLD QL: SIGNIFICANT CHANGE UP
WBC # BLD: 24.25 K/UL — HIGH (ref 4.8–10.8)
WBC # BLD: 24.73 K/UL — HIGH (ref 4.8–10.8)
WBC # FLD AUTO: 24.25 K/UL — HIGH (ref 4.8–10.8)
WBC # FLD AUTO: 24.73 K/UL — HIGH (ref 4.8–10.8)
WBC UR QL: 1 /HPF — SIGNIFICANT CHANGE UP (ref 0–5)

## 2020-01-10 PROCEDURE — 74177 CT ABD & PELVIS W/CONTRAST: CPT | Mod: 26

## 2020-01-10 PROCEDURE — 99223 1ST HOSP IP/OBS HIGH 75: CPT | Mod: AI

## 2020-01-10 PROCEDURE — 93010 ELECTROCARDIOGRAM REPORT: CPT

## 2020-01-10 PROCEDURE — 71045 X-RAY EXAM CHEST 1 VIEW: CPT | Mod: 26

## 2020-01-10 PROCEDURE — 99221 1ST HOSP IP/OBS SF/LOW 40: CPT

## 2020-01-10 PROCEDURE — 99497 ADVNCD CARE PLAN 30 MIN: CPT | Mod: 25

## 2020-01-10 PROCEDURE — 74018 RADEX ABDOMEN 1 VIEW: CPT | Mod: 26

## 2020-01-10 RX ORDER — FUROSEMIDE 40 MG
20 TABLET ORAL DAILY
Refills: 0 | Status: DISCONTINUED | OUTPATIENT
Start: 2020-01-10 | End: 2020-01-13

## 2020-01-10 RX ORDER — SODIUM CHLORIDE 9 MG/ML
1000 INJECTION, SOLUTION INTRAVENOUS ONCE
Refills: 0 | Status: COMPLETED | OUTPATIENT
Start: 2020-01-10 | End: 2020-01-10

## 2020-01-10 RX ORDER — METRONIDAZOLE 500 MG
500 TABLET ORAL EVERY 8 HOURS
Refills: 0 | Status: DISCONTINUED | OUTPATIENT
Start: 2020-01-10 | End: 2020-01-11

## 2020-01-10 RX ORDER — DIGOXIN 250 MCG
0.12 TABLET ORAL DAILY
Refills: 0 | Status: DISCONTINUED | OUTPATIENT
Start: 2020-01-10 | End: 2020-01-10

## 2020-01-10 RX ORDER — INSULIN LISPRO 100/ML
8 VIAL (ML) SUBCUTANEOUS ONCE
Refills: 0 | Status: COMPLETED | OUTPATIENT
Start: 2020-01-10 | End: 2020-01-10

## 2020-01-10 RX ORDER — GLUCAGON INJECTION, SOLUTION 0.5 MG/.1ML
1 INJECTION, SOLUTION SUBCUTANEOUS ONCE
Refills: 0 | Status: DISCONTINUED | OUTPATIENT
Start: 2020-01-10 | End: 2020-01-17

## 2020-01-10 RX ORDER — MORPHINE SULFATE 50 MG/1
2 CAPSULE, EXTENDED RELEASE ORAL
Refills: 0 | Status: DISCONTINUED | OUTPATIENT
Start: 2020-01-10 | End: 2020-01-15

## 2020-01-10 RX ORDER — VANCOMYCIN HCL 1 G
1000 VIAL (EA) INTRAVENOUS ONCE
Refills: 0 | Status: COMPLETED | OUTPATIENT
Start: 2020-01-10 | End: 2020-01-10

## 2020-01-10 RX ORDER — PANTOPRAZOLE SODIUM 20 MG/1
40 TABLET, DELAYED RELEASE ORAL
Refills: 0 | Status: DISCONTINUED | OUTPATIENT
Start: 2020-01-10 | End: 2020-01-17

## 2020-01-10 RX ORDER — DEXTROSE 50 % IN WATER 50 %
12.5 SYRINGE (ML) INTRAVENOUS ONCE
Refills: 0 | Status: DISCONTINUED | OUTPATIENT
Start: 2020-01-10 | End: 2020-01-17

## 2020-01-10 RX ORDER — DEXTROSE 50 % IN WATER 50 %
15 SYRINGE (ML) INTRAVENOUS ONCE
Refills: 0 | Status: DISCONTINUED | OUTPATIENT
Start: 2020-01-10 | End: 2020-01-17

## 2020-01-10 RX ORDER — PIPERACILLIN AND TAZOBACTAM 4; .5 G/20ML; G/20ML
3.38 INJECTION, POWDER, LYOPHILIZED, FOR SOLUTION INTRAVENOUS ONCE
Refills: 0 | Status: COMPLETED | OUTPATIENT
Start: 2020-01-10 | End: 2020-01-10

## 2020-01-10 RX ORDER — CEFEPIME 1 G/1
1000 INJECTION, POWDER, FOR SOLUTION INTRAMUSCULAR; INTRAVENOUS EVERY 12 HOURS
Refills: 0 | Status: DISCONTINUED | OUTPATIENT
Start: 2020-01-10 | End: 2020-01-10

## 2020-01-10 RX ORDER — ASPIRIN/CALCIUM CARB/MAGNESIUM 324 MG
81 TABLET ORAL DAILY
Refills: 0 | Status: DISCONTINUED | OUTPATIENT
Start: 2020-01-10 | End: 2020-01-17

## 2020-01-10 RX ORDER — INSULIN LISPRO 100/ML
5 VIAL (ML) SUBCUTANEOUS
Refills: 0 | Status: DISCONTINUED | OUTPATIENT
Start: 2020-01-10 | End: 2020-01-10

## 2020-01-10 RX ORDER — DILTIAZEM HCL 120 MG
30 CAPSULE, EXT RELEASE 24 HR ORAL EVERY 8 HOURS
Refills: 0 | Status: DISCONTINUED | OUTPATIENT
Start: 2020-01-10 | End: 2020-01-17

## 2020-01-10 RX ORDER — INSULIN GLARGINE 100 [IU]/ML
15 INJECTION, SOLUTION SUBCUTANEOUS AT BEDTIME
Refills: 0 | Status: DISCONTINUED | OUTPATIENT
Start: 2020-01-10 | End: 2020-01-10

## 2020-01-10 RX ORDER — INFLUENZA VIRUS VACCINE 15; 15; 15; 15 UG/.5ML; UG/.5ML; UG/.5ML; UG/.5ML
0.5 SUSPENSION INTRAMUSCULAR ONCE
Refills: 0 | Status: DISCONTINUED | OUTPATIENT
Start: 2020-01-10 | End: 2020-01-17

## 2020-01-10 RX ORDER — INSULIN LISPRO 100/ML
10 VIAL (ML) SUBCUTANEOUS
Refills: 0 | Status: DISCONTINUED | OUTPATIENT
Start: 2020-01-10 | End: 2020-01-11

## 2020-01-10 RX ORDER — HEPARIN SODIUM 5000 [USP'U]/ML
5000 INJECTION INTRAVENOUS; SUBCUTANEOUS EVERY 12 HOURS
Refills: 0 | Status: DISCONTINUED | OUTPATIENT
Start: 2020-01-10 | End: 2020-01-17

## 2020-01-10 RX ORDER — ONDANSETRON 8 MG/1
4 TABLET, FILM COATED ORAL ONCE
Refills: 0 | Status: COMPLETED | OUTPATIENT
Start: 2020-01-10 | End: 2020-01-10

## 2020-01-10 RX ORDER — MEROPENEM 1 G/30ML
1000 INJECTION INTRAVENOUS EVERY 12 HOURS
Refills: 0 | Status: DISCONTINUED | OUTPATIENT
Start: 2020-01-10 | End: 2020-01-14

## 2020-01-10 RX ORDER — VANCOMYCIN HCL 1 G
1000 VIAL (EA) INTRAVENOUS EVERY 24 HOURS
Refills: 0 | Status: DISCONTINUED | OUTPATIENT
Start: 2020-01-10 | End: 2020-01-10

## 2020-01-10 RX ORDER — DEXTROSE 50 % IN WATER 50 %
25 SYRINGE (ML) INTRAVENOUS ONCE
Refills: 0 | Status: DISCONTINUED | OUTPATIENT
Start: 2020-01-10 | End: 2020-01-17

## 2020-01-10 RX ORDER — LEVOTHYROXINE SODIUM 125 MCG
50 TABLET ORAL DAILY
Refills: 0 | Status: DISCONTINUED | OUTPATIENT
Start: 2020-01-10 | End: 2020-01-17

## 2020-01-10 RX ORDER — INSULIN GLARGINE 100 [IU]/ML
30 INJECTION, SOLUTION SUBCUTANEOUS AT BEDTIME
Refills: 0 | Status: DISCONTINUED | OUTPATIENT
Start: 2020-01-10 | End: 2020-01-11

## 2020-01-10 RX ORDER — SIMVASTATIN 20 MG/1
10 TABLET, FILM COATED ORAL AT BEDTIME
Refills: 0 | Status: DISCONTINUED | OUTPATIENT
Start: 2020-01-10 | End: 2020-01-10

## 2020-01-10 RX ORDER — SODIUM CHLORIDE 9 MG/ML
1000 INJECTION INTRAMUSCULAR; INTRAVENOUS; SUBCUTANEOUS
Refills: 0 | Status: DISCONTINUED | OUTPATIENT
Start: 2020-01-10 | End: 2020-01-12

## 2020-01-10 RX ORDER — SODIUM CHLORIDE 9 MG/ML
1000 INJECTION, SOLUTION INTRAVENOUS
Refills: 0 | Status: DISCONTINUED | OUTPATIENT
Start: 2020-01-10 | End: 2020-01-15

## 2020-01-10 RX ORDER — INSULIN LISPRO 100/ML
10 VIAL (ML) SUBCUTANEOUS ONCE
Refills: 0 | Status: COMPLETED | OUTPATIENT
Start: 2020-01-10 | End: 2020-01-10

## 2020-01-10 RX ADMIN — Medication 8 UNIT(S): at 09:41

## 2020-01-10 RX ADMIN — Medication 5 UNIT(S): at 12:16

## 2020-01-10 RX ADMIN — PIPERACILLIN AND TAZOBACTAM 3.38 GRAM(S): 4; .5 INJECTION, POWDER, LYOPHILIZED, FOR SOLUTION INTRAVENOUS at 02:12

## 2020-01-10 RX ADMIN — PIPERACILLIN AND TAZOBACTAM 200 GRAM(S): 4; .5 INJECTION, POWDER, LYOPHILIZED, FOR SOLUTION INTRAVENOUS at 01:10

## 2020-01-10 RX ADMIN — Medication 81 MILLIGRAM(S): at 12:17

## 2020-01-10 RX ADMIN — Medication 100 MILLIGRAM(S): at 21:58

## 2020-01-10 RX ADMIN — SODIUM CHLORIDE 100 MILLILITER(S): 9 INJECTION INTRAMUSCULAR; INTRAVENOUS; SUBCUTANEOUS at 12:16

## 2020-01-10 RX ADMIN — HEPARIN SODIUM 5000 UNIT(S): 5000 INJECTION INTRAVENOUS; SUBCUTANEOUS at 18:28

## 2020-01-10 RX ADMIN — ONDANSETRON 4 MILLIGRAM(S): 8 TABLET, FILM COATED ORAL at 09:41

## 2020-01-10 RX ADMIN — Medication 30 MILLIGRAM(S): at 14:53

## 2020-01-10 RX ADMIN — Medication 10 UNIT(S): at 12:17

## 2020-01-10 RX ADMIN — MORPHINE SULFATE 2 MILLIGRAM(S): 50 CAPSULE, EXTENDED RELEASE ORAL at 18:23

## 2020-01-10 RX ADMIN — MEROPENEM 100 MILLIGRAM(S): 1 INJECTION INTRAVENOUS at 18:34

## 2020-01-10 RX ADMIN — Medication 1 ENEMA: at 04:20

## 2020-01-10 RX ADMIN — SODIUM CHLORIDE 1000 MILLILITER(S): 9 INJECTION, SOLUTION INTRAVENOUS at 02:12

## 2020-01-10 RX ADMIN — SODIUM CHLORIDE 1000 MILLILITER(S): 9 INJECTION, SOLUTION INTRAVENOUS at 03:17

## 2020-01-10 RX ADMIN — Medication 250 MILLIGRAM(S): at 01:10

## 2020-01-10 RX ADMIN — SODIUM CHLORIDE 1000 MILLILITER(S): 9 INJECTION, SOLUTION INTRAVENOUS at 00:45

## 2020-01-10 RX ADMIN — Medication 100 MILLIGRAM(S): at 14:55

## 2020-01-10 NOTE — H&P ADULT - ATTENDING COMMENTS
A 89 year old female with PMH of HTN, DM type 2, Atrial fibrillation, PUD and HFrEF was brought to ED from Nursing home for lethargy, abdominal pain and change in her mental status, patient is poor historian and confused, as per NH patient started with abdominal pain for two days with nausea, constipation, no fever. She was treated recently with Macrobid for UTI.  In the ED: BP:136/70, HR:72 regular, RR:15, Temp: 100.6, KUB: large stool burden. CT Abdomen in ED: showed distended rectum with fecal impaction, urinary tract infection, mild colitis and diverticulitis. Lactate: 4.6 (trending up)on admission, creatinine elevated to 1.8 (baseline ~1.2),     PHYSICAL EXAM:  GENERAL: lethargic, in pain, mild distress.   HEAD:  Atraumatic, Normocephalic  EYES: EOMI, PERRLA, conjunctiva and sclera clear  NECK: Supple, No JVD  CHEST/LUNG: Clear to auscultation bilaterally; No wheeze  HEART: Irregular rate and rhythm; S1 S2  ABDOMEN: distended, moderate tenderness with guarding.   EXTREMITIES:  2+ Peripheral Pulses, No clubbing, cyanosis, or edema  PSYCH: AAOx1  NEUROLOGY: non-focal    A/P:   Severe Sepsis: possible due to colitis and or UTI  UA is negative, abdomen CT showed no evidence of acute inflammatory process, possible urinary tract infection (left renal fullness and proximal hydroureter. Possible mild, uncomplicated colitis or diverticulitis.  UA is negative*(she was recently treated with Macrobid).   CXR is clear. WBC 24K, lactic acid is elevated. OTTO,  Start on Cefepime and Vancomycin. IV fluid 100CC/hr.    OTTO: possible pre-renal ot ATN  Start on IV fluid for 24 hrs, monitor I/O    High anion gap metabolic acidosis:   from lactic acidosis and DKA, hyperglycemia  Continue IV fluid, Lantus.    Chronic HFrEF:   Continue Lasix. No signs of volume overload  Caution with IV fluid.    Patient is DNR/DNI, poor prognosis, will discuss with family palliative care and comfort measures. A 89 year old female with PMH of HTN, DM type 2, Atrial fibrillation, PUD and HFrEF was brought to ED from Nursing home for lethargy, abdominal pain and change in her mental status, patient is poor historian and confused, as per NH patient started with abdominal pain for two days with nausea, constipation, no fever. She was treated recently with Macrobid for UTI.  In the ED: BP:136/70, HR:72 regular, RR:15, Temp: 100.6, KUB: large stool burden. CT Abdomen in ED: showed distended rectum with fecal impaction, urinary tract infection, mild colitis and diverticulitis. Lactate: 4.6 (trending up)on admission, creatinine elevated to 1.8 (baseline ~1.2),     PHYSICAL EXAM:  GENERAL: lethargic, in pain, mild distress.   HEAD:  Atraumatic, Normocephalic  EYES: EOMI, PERRLA, conjunctiva and sclera clear  NECK: Supple, No JVD  CHEST/LUNG: Clear to auscultation bilaterally; No wheeze  HEART: Irregular rate and rhythm; S1 S2  ABDOMEN: distended, moderate tenderness with guarding.   EXTREMITIES:  2+ Peripheral Pulses, No clubbing, cyanosis, or edema  PSYCH: AAOx1  NEUROLOGY: non-focal    A/P:   Severe Sepsis: possible due to colitis and or UTI  UA is negative, abdomen CT showed no evidence of acute inflammatory process, possible urinary tract infection (left renal fullness and proximal hydroureter. Possible mild, uncomplicated colitis or diverticulitis.  UA is negative*(she was recently treated with Macrobid).   CXR is clear. WBC 24K, lactic acid is elevated. OTTO,  Start on Cefepime and Vancomycin. IV fluid 100CC/hr.    OTTO: possible pre-renal ot ATN  Start on IV fluid for 24 hrs, monitor I/O    Metabolic encephalopathy: due to sepsis  High anion gap metabolic acidosis:   from lactic acidosis and DKA, hyperglycemia  Continue IV fluid, Lantus.    Chronic HFrEF:   Continue Lasix. No signs of volume overload  Caution with IV fluid.    Patient is DNR/DNI, poor prognosis, will discuss with family palliative care and comfort measures.

## 2020-01-10 NOTE — GOALS OF CARE CONVERSATION - ADVANCED CARE PLANNING - CONVERSATION DETAILS
Consult received, chart reviewed, spoke with Medical team.  Pt is a 89 y/o female admitted from [facility] where she is a long term resident.  Pt admitted for abdominal pain.  Pt has a PMHx of HTN, gastric ulcer, hypothyroid, HLD, CKD, DMII, heart failure.  Pt now with sepsis.  DNR/DNI.  Palliative discussed options for treatment with daughter.  At this time, daughter would like to continue with IV antibiotics and fluids.  Palliative will follow and continue to address goals of care.  Daughter understands patient has a poor overall prognosis.  Palliative team will reach out to patient's son Alverto Gonzalez .

## 2020-01-10 NOTE — CONSULT NOTE ADULT - ASSESSMENT
IMPRESSION: Rehab of gait dysfunction    PRECAUTIONS: [  ] Cardiac  [  ] Respiratory  [  ] Seizures [  ] Contact Isolation  [  ] Droplet Isolation  [  ] Other    Weight Bearing Status:     RECOMMENDATION:    Out of Bed to Chair     DVT/Decubiti Prophylaxis    REHAB PLAN:     [  x ] Bedside P/T 3-5 times a week   [   ]   Bedside O/T  2-3 times a week             [   ] No Rehab Therapy Indicated                   [   ]  Speech Therapy   Conditioning/ROM                                    ADL  Bed Mobility                                               Conditioning/ROM  Transfers                                                     Bed Mobility  Sitting /Standing Balance                         Transfers                                        Gait Training                                               Sitting/Standing Balance  Stair Training [   ]Applicable                    Home equipment Eval                                                                        Splinting  [   ] Only      GOALS:   ADL   [   ]   Independent                    Transfers  [   ] Independent                          Ambulation  [   ] Independent     [  x  ] With device                            [   ]  CG                                                         [ x  ]  CG                                                                  [  x ] CG                            [    ] Min A                                                   [   ] Min A                                                              [   ] Min  A          DISCHARGE PLAN:   [   ]  Good candidate for Intensive Rehabilitation/Hospital based                                             Will tolerate 3hrs Intensive Rehab Daily                                       [ x   ]  Short Term Rehab in Skilled Nursing Facility / snf                                       [    ]  Home with Outpatient or VN services                                         [    ]  Possible Candidate for Intensive Hospital based Rehab

## 2020-01-10 NOTE — CONSULT NOTE ADULT - ASSESSMENT
89yFemale being evaluated for goals of care in setting of elderly, frail pt with multiple comorbidities, sent from NH w fecal impaction (now resolved), being medically managed for suspected sepsis 2/2 UTI with OTTO     Pt's daughter at bedside, tearful as she understands pt's overall poor prognosis.  Palliative Care services introduced. Clinical condition reviewed.   DNR/DNI status confirmed. At thist time, daughter wishes to continue conservative medical mngmnt with IVF and Abx in hopes of recovery. No aggressive/invasive measures. If pt continues to decline, plan will be to transition to comfort measures only.   Spoke to pt's son (Alverto) who agrees with above stated plan.     All questions answered in detail  Support provided  35 minutes spent discussing advanced care planning      Recommendations:  cont med mngmnt w IVF, IV ABx  GOC as above  Add morphine 2mg IV q3h PRN pain/distress  ativan 0.25mg IV q6h PRN agitation/restlessness      We will follow  [phone]

## 2020-01-10 NOTE — GOALS OF CARE CONVERSATION - ADVANCED CARE PLANNING - TREATMENT GUIDELINE COMMENT
DNR/DNI, continue with IV antibiotics and fluids for now.  Palliative will continue to follow and provide ongoing support. x [phone]

## 2020-01-10 NOTE — ED ADULT NURSE NOTE - OBJECTIVE STATEMENT
came fro [facility] for abdominal pain. Pt is confused, not able to answer any questions. Bowel movement under the diaper noticed, yellow color.

## 2020-01-10 NOTE — H&P ADULT - HISTORY OF PRESENT ILLNESS
89 yo with PMH of hypertension, gastric ulcer, hypothyroidism, HLD, CHF, CKD, DMII, chronic systolic and diastolic heart failure, Afib with RVR, presents to the ED from nursing home with chief complaint of acute, worsening abdominal pain for the past two days associated with nausea. Patient is AAOx1 and a poor historian. Patient also complains of suprapubic pain and states that this pain has been there for a long time. Patient was finishing a course of microbid for recently diagnosed UTI.   ED: BP:136/70, HR:72 regular, RR:15, Temp: 100.6, KUB: large stool burden. CT Abdomen in ED: showed distended rectum with fecal impaction, urinary tract infection. Lactate: 4.6 on admission, creatinine elevated to 1.8 (baseline ~1.2),  Patient s/p 2 L LR, Zosyn and Vancomycin 89 yo with PMH of hypertension, gastric ulcer, hypothyroidism, HLD, CHF, CKD, DMII, chronic systolic and diastolic heart failure, Afib with RVR, presents to the ED from nursing home with chief complaint of acute, worsening abdominal pain for the past two days associated with nausea. Patient is AAOx1 and a poor historian. Patient also complains of suprapubic pain and states that this pain has been there for a long time. Patient was finishing a course of microbid for recently diagnosed UTI.     ED: BP:136/70, HR:72 regular, RR:15, Temp: 100.6, KUB: large stool burden. CT Abdomen in ED: showed distended rectum with fecal impaction, urinary tract infection, Lactate: 4.6 on admission, creatinine elevated to 1.8 (baseline ~1.2),  Patient s/p 2 L LR, Zosyn and Vancomycin. Patient had large BM after fleet enema this am, with mild relief of abdominal pain. 89 yo with PMH of hypertension, gastric ulcer, hypothyroidism, HLD, CHF, CKD, DMII, chronic systolic and diastolic heart failure, Afib with RVR, presents to the ED from nursing home with chief complaint of acute, worsening abdominal pain for the past two days associated with nausea. Patient is currently AAOx1 and a poor historian. Collateral information obtained from [facility], patient is AAOx2 at baseline with bouts of confusion.  Patient also complains of suprapubic pain and states that this pain has been there for a long time. Patient was finishing a course of microbid for recently diagnosed UTI. She denies any acute chest pain, sob, diarrhea dizziness.     ED: BP:136/70, HR:72 regular, RR:15, Temp: 100.6, KUB: large stool burden. CT Abdomen in ED: showed distended rectum with fecal impaction, urinary tract infection, Lactate: 4.6 on admission, creatinine elevated to 1.8 (baseline ~1.2),  Patient s/p 2 L LR, Zosyn and Vancomycin. Patient had large BM after fleet enema this am, with mild relief of abdominal pain. 89 yo with PMH of hypertension, gastric ulcer, hypothyroidism, HLD, CHF, CKD, DMII, chronic systolic and diastolic heart failure, Afib with RVR, presents to the ED from nursing home with chief complaint of acute, worsening abdominal pain for the past two days associated with nausea. Patient is currently AAOx1 and a poor historian. Collateral information obtained from [facility], patient is AAOx2 at baseline with bouts of confusion.  Patient also complains of suprapubic pain and states that this pain has been there for a long time. Patient was finishing a course of microbid for recently diagnosed UTI. She denies any acute chest pain, sob, diarrhea dizziness.     ED: BP:136/70, HR:72 regular, RR:15, Temp: 100.6, KUB: large stool burden. CT Abdomen in ED: showed distended rectum with fecal impaction, urinary tract infection, Lactate: 4.6 (trending up)on admission, creatinine elevated to 1.8 (baseline ~1.2),  Patient s/p 2 L LR, Zosyn and Vancomycin. Patient had large BM after fleet enema this am, with mild relief of abdominal pain.

## 2020-01-10 NOTE — H&P ADULT - NSHPLABSRESULTS_GEN_ALL_CORE
10.6   24.25 )-----------( 287      ( 09 Jan 2020 23:50 )             33.4   01-09    140  |  96<L>  |  49<H>  ----------------------------<  482<HH>  4.7   |  20  |  1.8<H>    Ca    8.8      09 Jan 2020 23:50    TPro  6.2  /  Alb  3.5  /  TBili  0.7  /  DBili  x   /  AST  46<H>  /  ALT  18  /  AlkPhos  113  01-09    < from: CT Abdomen and Pelvis w/ IV Cont (01.10.20 @ 01:54) >      Definitive acute inflammatory abnormality is not identified.    Stool-filled distended rectum to 10.5 cm which can be seen in fecal impaction.    Left renal fullness and proximal hydroureter with questionable mild urothelial enhancement of the left renal collecting system. Consider correlation with urinalysis to exclude urinary infection.    Underdistention versus focal wall thickening at the descending/proximal sigmoid colon junction without discretely inflamed diverticulum. In the appropriate clinical setting, findings mayreflect a mild, uncomplicated colitis or diverticulitis.    < end of copied text >

## 2020-01-10 NOTE — CONSULT NOTE ADULT - SUBJECTIVE AND OBJECTIVE BOX
Patient is a 89y old  Female who presents with a chief complaint of Abdominal pain (10 Rogelio 2020 09:07)      HPI:  89 yo with PMH of hypertension, gastric ulcer, hypothyroidism, HLD, CHF, CKD, DMII, chronic systolic and diastolic heart failure, Afib , presents to the ED from nursing home with chief complaint of acute, worsening abdominal pain for the past two days associated with nausea. Patient is currently AAOx1 and a poor historian. Collateral information obtained from [facility], patient is AAOx2 at baseline with bouts of confusion.  Patient also complains of suprapubic pain and states that this pain has been there for a long time. Patient was finishing a course of microbid for recently diagnosed UTI. She denies any acute chest pain, sob, diarrhea dizziness.     ED: BP:136/70, HR:72 regular, RR:15, Temp: 100.6, KUB: large stool burden. CT Abdomen in ED: showed distended rectum with fecal impaction, urinary tract infection, Lactate: 4.6 on admission, creatinine elevated to 1.8 (baseline ~1.2),  Patient s/p 2 L LR, Zosyn and Vancomycin. Patient had large BM after fleet enema this am, with mild relief of abdominal pain. (10 Rogelio 2020 09:07)        PAST MEDICAL & SURGICAL HISTORY:  Afib  History of cholecystectomy  H/O abdominal hysterectomy  Gastric ulcer  Hypothyroid  DM (diabetes mellitus) type II controlled peripheral vascular disorder  Hyperlipidemia  Hypertension  H/O shoulder surgery: left side      SOCIAL HX:   UTO    FAMILY HISTORY:  :  No known cardiovacular family hisotry     ROS:  See HPI     Allergies    ciprofloxacin (Unknown)  Flagyl (Hives)    Intolerances          PHYSICAL EXAM    ICU Vital Signs Last 24 Hrs  T(C): 35.9 (10 Rogelio 2020 07:37), Max: 38.1 (10 Rogelio 2020 00:09)  T(F): 96.7 (10 Rogelio 2020 07:37), Max: 100.6 (10 Rogelio 2020 00:09)  HR: 65 (10 Rogelio 2020 07:37) (64 - 78)  BP: 96/55 (10 Rogelio 2020 07:37) (96/55 - 136/89)  BP(mean): --  ABP: --  ABP(mean): --  RR: 18 (10 Rogelio 2020 07:37) (18 - 22)  SpO2: 96% on RA      General: In NAD   HEENT:  AYSHA              Lungs: Bilateral BS  Cardiovascular: irregular  Gastrointestinal: Soft, Positive BS  Musculoskeletal: No clubbing.  Moves all extremities.  Full range of motion   Skin: Warm.  Intact  Neurological: aao x 1 , awake and alert      20 @ 07:01  -  01-10-20 @ 07:00  --------------------------------------------------------  IN:    Lactated Ringers IV Bolus: 2000 mL  Total IN: 2000 mL    OUT:    Voided: 900 mL  Total OUT: 900 mL    Total NET: 1100 mL          LABS:                          10.6   24.25 )-----------( 287      ( 2020 23:50 )             33.4                                                   140  |  96<L>  |  49<H>  ----------------------------<  482<HH>  4.7   |  20  |  1.8<H>    Ca    8.8      2020 23:50    TPro  6.2  /  Alb  3.5  /  TBili  0.7  /  DBili  x   /  AST  46<H>  /  ALT  18  /  AlkPhos  113        PT/INR - ( 2020 23:50 )   PT: 15.50 sec;   INR: 1.35 ratio         PTT - ( 2020 23:50 )  PTT:27.1 sec                                       Urinalysis Basic - ( 2020 03:51 )    Color: Yellow / Appearance: Clear / S.028 / pH: x  Gluc: x / Ketone: Negative  / Bili: Negative / Urobili: <2 mg/dL   Blood: x / Protein: 30 mg/dL / Nitrite: Negative   Leuk Esterase: Negative / RBC: 3 /HPF / WBC 1 /HPF   Sq Epi: x / Non Sq Epi: 0 /HPF / Bacteria: Negative        CARDIAC MARKERS ( 2020 23:50 )  x     / 0.20 ng/mL / x     / x     / x                                                LIVER FUNCTIONS - ( 2020 23:50 )  Alb: 3.5 g/dL / Pro: 6.2 g/dL / ALK PHOS: 113 U/L / ALT: 18 U/L / AST: 46 U/L / GGT: x                                                                                                                                       X-Rays    < from: Xray Chest 1 View-PORTABLE IMMEDIATE (01.10.20 @ 00:39) >  No radiographic evidence ofacute cardiopulmonary disease.  No intraperitoneal free air      < end of copied text >    < from: CT Abdomen and Pelvis w/ IV Cont (01.10.20 @ 01:54) >  Definitive acute inflammatory abnormality is not identified.    Stool-filled distended rectum to 10.5 cm which can be seen in fecal impaction.    Left renal fullness and proximal hydroureter with questionable mild urothelial enhancement of the left renal collecting system. Consider correlation with urinalysis to exclude urinary infection.    Underdistention versus focal wall thickening at the descending/proximal sigmoid colon junction without discretely inflamed diverticulum. In the appropriate clinical setting, findings mayreflect a mild, uncomplicated colitis or diverticulitis.      < end of copied text >      MEDICATIONS  (STANDING):  aspirin enteric coated 81 milliGRAM(s) Oral daily  cefepime   IVPB 1000 milliGRAM(s) IV Intermittent every 12 hours  dextrose 5%. 1000 milliLiter(s) (50 mL/Hr) IV Continuous <Continuous>  dextrose 50% Injectable 12.5 Gram(s) IV Push once  dextrose 50% Injectable 25 Gram(s) IV Push once  dextrose 50% Injectable 25 Gram(s) IV Push once  digoxin     Tablet 0.125 milliGRAM(s) Oral daily  diltiazem    Tablet 30 milliGRAM(s) Oral every 8 hours  furosemide    Tablet 20 milliGRAM(s) Oral daily  heparin  Injectable 5000 Unit(s) SubCutaneous every 12 hours  influenza   Vaccine 0.5 milliLiter(s) IntraMuscular once  insulin glargine Injectable (LANTUS) 15 Unit(s) SubCutaneous at bedtime  insulin lispro Injectable (HumaLOG) 5 Unit(s) SubCutaneous before breakfast  insulin lispro Injectable (HumaLOG) 5 Unit(s) SubCutaneous before lunch  insulin lispro Injectable (HumaLOG) 5 Unit(s) SubCutaneous before dinner  levothyroxine 50 MICROGram(s) Oral daily  pantoprazole    Tablet 40 milliGRAM(s) Oral before breakfast  vancomycin  IVPB 1000 milliGRAM(s) IV Intermittent every 24 hours    MEDICATIONS  (PRN):  dextrose 40% Gel 15 Gram(s) Oral once PRN Blood Glucose LESS THAN 70 milliGRAM(s)/deciliter  glucagon  Injectable 1 milliGRAM(s) IntraMuscular once PRN Glucose LESS THAN 70 milligrams/deciliter      < from: TTE Echo Doppler w/o Cont (10.09.19 @ 11:07) >   1. Left ventricular ejection fraction, by visual estimation, is 35 to   40%.   2. Severely decreased global left ventricular systolic function.   3. Basal and mid anterior wall, basal and mid inferior septum, and basal   inferolateral segment are abnormal as described above.   4. Normal left ventricular internal cavity size.   5. Spectral Doppler shows restrictive pattern of left ventricular   myocardial filling (Grade III diastolic dysfunction).   6. Normal right ventricular size and function.   7. The left atrium is normal in size.   8. The right atrium is normal in size.   9. Small pericardial effusion.  10. Mild mitral annular calcification.  11. Mild mitral valve regurgitation.  12. Thickening of the anterior and posterior mitral valve leaflets.  13. Structurally normal tricuspid valve, with normal leaflet excursion.  14. Mild aortic regurgitation.  15. Structurally normal pulmonic valve, with normal leaflet excursion.      < end of copied text >

## 2020-01-10 NOTE — H&P ADULT - NSHPREVIEWOFSYSTEMS_GEN_ALL_CORE
REVIEW OF SYSTEMS:    CONSTITUTIONAL: No weakness, + fever  EYES/ENT: No visual changes;  No vertigo or throat pain   NECK: No pain or stiffness  RESPIRATORY: No cough, wheezing, hemoptysis; No shortness of breath  CARDIOVASCULAR: No chest pain or palpitations  GASTROINTESTINAL: + diffuse abdominal pain. +  No nausea, no vomiting or hematemesis; No diarrhea or constipation. No melena or hematochezia.  GENITOURINARY: + dysuria, no frequency or hematuria  NEUROLOGICAL: No new numbness or weakness  SKIN: No itching, rashes

## 2020-01-10 NOTE — ED ADULT NURSE NOTE - NSIMPLEMENTINTERV_GEN_ALL_ED
Implemented All Fall with Harm Risk Interventions:  [place] to call system. Call bell, personal items and telephone within reach. Instruct patient to call for assistance. Room bathroom lighting operational. Non-slip footwear when patient is off stretcher. Physically safe environment: no spills, clutter or unnecessary equipment. Stretcher in lowest position, wheels locked, appropriate side rails in place. Provide visual cue, wrist band, yellow gown, etc. Monitor gait and stability. Monitor for mental status changes and reorient to person, place, and time. Review medications for side effects contributing to fall risk. Reinforce activity limits and safety measures with patient and family. Provide visual clues: red socks.

## 2020-01-10 NOTE — CONSULT NOTE ADULT - SUBJECTIVE AND OBJECTIVE BOX
HPI:  89 yo with PMH of hypertension, gastric ulcer, hypothyroidism, HLD, CHF, CKD, DMII, chronic systolic and diastolic heart failure, Afib with RVR, presents to the ED from nursing home with chief complaint of acute, worsening abdominal pain for the past two days associated with nausea. Patient is currently AAOx1 and a poor historian. Collateral information obtained from [facility], patient is AAOx2 at baseline with bouts of confusion.  Patient also complains of suprapubic pain and states that this pain has been there for a long time. Patient was finishing a course of microbid for recently diagnosed UTI. She denies any acute chest pain, sob, diarrhea dizziness.     ED: BP:136/70, HR:72 regular, RR:15, Temp: 100.6, KUB: large stool burden. CT Abdomen in ED: showed distended rectum with fecal impaction, urinary tract infection, Lactate: 4.6 (trending up)on admission, creatinine elevated to 1.8 (baseline ~1.2),  Patient s/p 2 L LR, Zosyn and Vancomycin. Patient had large BM after fleet enema this am, with mild relief of abdominal pain. (10 Rogelio 2020 09:07)      PAST MEDICAL & SURGICAL HISTORY:  Afib  History of cholecystectomy  H/O abdominal hysterectomy  Gastric ulcer  Hypothyroid  DM (diabetes mellitus) type II controlled peripheral vascular disorder  Hyperlipidemia  Hypertension  H/O shoulder surgery: left side      Hospital Course:    TODAY'S SUBJECTIVE & REVIEW OF SYMPTOMS:     Constitutional WNL   Cardio WNL   Resp WNL   GI abd pain  Heme WNL  Endo WNL  Skin WNL  MSK weakness  Neuro WNL  Cognitive WNL  Psych WNL      MEDICATIONS  (STANDING):  aspirin enteric coated 81 milliGRAM(s) Oral daily  cefepime   IVPB 1000 milliGRAM(s) IV Intermittent every 12 hours  dextrose 5%. 1000 milliLiter(s) (50 mL/Hr) IV Continuous <Continuous>  dextrose 50% Injectable 12.5 Gram(s) IV Push once  dextrose 50% Injectable 25 Gram(s) IV Push once  dextrose 50% Injectable 25 Gram(s) IV Push once  diltiazem    Tablet 30 milliGRAM(s) Oral every 8 hours  furosemide    Tablet 20 milliGRAM(s) Oral daily  heparin  Injectable 5000 Unit(s) SubCutaneous every 12 hours  influenza   Vaccine 0.5 milliLiter(s) IntraMuscular once  insulin glargine Injectable (LANTUS) 15 Unit(s) SubCutaneous at bedtime  insulin lispro Injectable (HumaLOG) 5 Unit(s) SubCutaneous before breakfast  insulin lispro Injectable (HumaLOG) 5 Unit(s) SubCutaneous before lunch  insulin lispro Injectable (HumaLOG) 5 Unit(s) SubCutaneous before dinner  levothyroxine 50 MICROGram(s) Oral daily  pantoprazole    Tablet 40 milliGRAM(s) Oral before breakfast  sodium chloride 0.9%. 1000 milliLiter(s) (100 mL/Hr) IV Continuous <Continuous>  vancomycin  IVPB 1000 milliGRAM(s) IV Intermittent every 24 hours    MEDICATIONS  (PRN):  dextrose 40% Gel 15 Gram(s) Oral once PRN Blood Glucose LESS THAN 70 milliGRAM(s)/deciliter  glucagon  Injectable 1 milliGRAM(s) IntraMuscular once PRN Glucose LESS THAN 70 milligrams/deciliter      FAMILY HISTORY:      Allergies    ciprofloxacin (Unknown)  Flagyl (Hives)    Intolerances        SOCIAL HISTORY:    [  ] Etoh  [  ] Smoking  [  ] Substance abuse     Home Environment:  [  ] Home Alone  [  ] Lives with Family  [  ] Home Health Aid    Dwelling:  [  ] Apartment  [  ] Private House  [  ] Adult Home  [  ] Skilled Nursing Facility      [ x ] Short Term  [  ] Long Term  [  ] Stairs       Elevator [  ]    FUNCTIONAL STATUS PTA: (Check all that apply)  Ambulation: [   ]Independent    [x  ] Dependent     [  ] Non-Ambulatory  Assistive Device: [  ] SA Cane  [  ]  Q Cane  [  ] Walker  [  ]  Wheelchair  ADL : [  ] Independent  [ x ]  Dependent       Vital Signs Last 24 Hrs  T(C): 35.9 (10 Rogelio 2020 07:37), Max: 38.1 (10 Rogelio 2020 00:09)  T(F): 96.7 (10 Rogelio 2020 07:37), Max: 100.6 (10 Rogelio 2020 00:09)  HR: 65 (10 Rogelio 2020 07:37) (64 - 78)  BP: 96/55 (10 Rogelio 2020 07:37) (96/55 - 136/89)  BP(mean): --  RR: 18 (10 Rogelio 2020 07:37) (18 - 22)  SpO2: 96% (10 Rogelio 2020 07:37) (96% - 100%)      PHYSICAL EXAM: Alert & awake  GENERAL: NAD  HEAD:  Atraumatic, Normocephalic  CHEST/LUNG: Clear   HEART: S1S2+  ABDOMEN: Soft, Nontender  EXTREMITIES:  no calf tenderness    NERVOUS SYSTEM:  Cranial Nerves 2-12 intact [  ] Abnormal  [  ]  ROM: WFL all extremities [ x ]  Abnormal [  ]  Motor Strength: WFL all extremities  [  ]  Abnormal [ x ] 4/5 all ext  Sensation: intact to light touch [x  ] Abnormal [  ]  Reflexes: Symmetric [  ]  Abnormal [  ]    FUNCTIONAL STATUS:  Bed Mobility: Independent [  ]  Supervision [  ]  Needs Assistance [x  ]  N/A [  ]  Transfers: Independent [  ]  Supervision [  ]  Needs Assistance [x  ]  N/A [  ]   Ambulation: Independent [  ]  Supervision [  ]  Needs Assistance [  ]  N/A [  ]  ADL: Independent [  ] Requires Assistance [  ] N/A [  ]      LABS:                        10.6   24.25 )-----------( 287      ( 2020 23:50 )             33.4     01-    140  |  96<L>  |  49<H>  ----------------------------<  482<HH>  4.7   |  20  |  1.8<H>    Ca    8.8      2020 23:50    TPro  6.2  /  Alb  3.5  /  TBili  0.7  /  DBili  x   /  AST  46<H>  /  ALT  18  /  AlkPhos  113  01-09    PT/INR - ( 2020 23:50 )   PT: 15.50 sec;   INR: 1.35 ratio         PTT - ( 2020 23:50 )  PTT:27.1 sec  Urinalysis Basic - ( 2020 03:51 )    Color: Yellow / Appearance: Clear / S.028 / pH: x  Gluc: x / Ketone: Negative  / Bili: Negative / Urobili: <2 mg/dL   Blood: x / Protein: 30 mg/dL / Nitrite: Negative   Leuk Esterase: Negative / RBC: 3 /HPF / WBC 1 /HPF   Sq Epi: x / Non Sq Epi: 0 /HPF / Bacteria: Negative        RADIOLOGY & ADDITIONAL STUDIES:    Assesment:

## 2020-01-10 NOTE — H&P ADULT - NSHPPHYSICALEXAM_GEN_ALL_CORE
General: In mild distress, confused, frail,   Neuro: alert and oriented to person only, no focal deficits, moves all extremities spontaneously  HEENT: NCAT, EOMI, anicteric, mucosa moist  Respiratory: airway patent, respirations unlabored  CVS: regular rate and rhythm  Abdomen: + mild distenstion, tender to deep and light palpation in all four quadrants, Anderson's negative  : + Suprapubic pain, no CVA tenderness, + suprapubic pain  Extremities: no edema, sensation and movement grossly intact  Skin: warm, dry, appropriate color

## 2020-01-10 NOTE — H&P ADULT - ASSESSMENT
89 yo with PMH of hypertension, gastric ulcer, hypothyroidism, HLD, CHF, CKD, DMII, chronic systolic and diastolic heart failure, Afib with RVR presented from [facility] with complaint of acute abdominal/suprapubic pain onset two days ago associated with nausea.    Sepsis secondary to Urinary Tract Infection  - febrile, AMS, elevated lactate 4.6, leukocytosis: 24 on admission  - Patient currently vitally stable, although has episodes of confusion (per nursing home, patient is AAOx2 at baseline with bouts of confusion)  - s/p 2L LR  - UCX, BCX pending  - CT Abdomen: + fecal impaction with large stool burden, Urinary Tract Infection, no CT evidence of pyelonephritis  - CXR: unremarkable  - f/u repeat cbc, lactate  - Given hx of UTI, DM, CKD, will cover patient with vancomycin and cefepime for now    Abdominal pain likely secondary to large stool burden  - CTAP: distended stool filled rectum measuring 10.5 cm  - KUB: large stool burden, non obstructive  - s/p fleet enema with large BM this am with symptomatic relief  - c/w colace and senna    OTTO on CKD 4  - baseline creatinine around 1.1, creatinine 1.8  - Renal U/S to r/o hydronephrosis  - f/u repeat bmp  - s/p 2L LR, consider light hydration in setting of CHF (last echo 10/19: EF 35-40%)    Atrial Fibrillation w RVR  - continue diltiazem   - continue digoxin  - not candidate for Ac    Chronic systolic and diastolic HF  - CXR: no cardiopulmonary disease  - c/w furosemide 20 mg PO daily    Type II DM  - on gylperide at NH  - FS elevated this am, will start Insulin basal bolus  - FS per routine     HTN  - controlled, continue current meds    Code status: DNR/DNI  Diet: carb consistent renal  dispo: acute, from [facility] 89 yo with PMH of hypertension, gastric ulcer, hypothyroidism, HLD, CHF, CKD, DMII, chronic systolic and diastolic heart failure, Afib with RVR presented from [facility] with complaint of acute abdominal/suprapubic pain onset two days ago associated with nausea.    Sepsis secondary to Urinary Tract Infection  - febrile, AMS, elevated lactate 4.6, leukocytosis: 24 on admission  - Patient currently vitally stable, although has episodes of confusion (per nursing home, patient is AAOx2 at baseline with bouts of confusion)  - s/p 2L LR  - UCX, BCX pending  - CT Abdomen: + fecal impaction with large stool burden, Urinary Tract Infection, no CT evidence of pyelonephritis  - CXR: unremarkable  - f/u repeat cbc, lactate  - Given hx of UTI, DM, CKD, will cover patient with vancomycin and cefepime for now    Abdominal pain likely secondary to large stool burden  - CTAP: distended stool filled rectum measuring 10.5 cm  - KUB: large stool burden, non obstructive  - s/p fleet enema with large BM this am with symptomatic relief  - c/w colace and senna    OTTO on CKD 4  - baseline creatinine around 1.1, creatinine 1.8  - Ni hydronephrosis on CTAP  - f/u repeat bmp  - s/p 2L LR, consider light hydration in setting of CHF (last echo 10/19: EF 35-40%)    Atrial Fibrillation w RVR  - continue diltiazem   - continue digoxin  - not candidate for Ac    Chronic systolic and diastolic HF  - CXR: no cardiopulmonary disease  - c/w furosemide 20 mg PO daily    Type II DM  - on gylperide at NH  - FS elevated this am, will start Insulin basal bolus  - FS per routine     HTN  - controlled, continue current meds    Code status: DNR/DNI  Diet: carb consistent renal  dispo: acute, from [facility] 89 yo with PMH of hypertension, gastric ulcer, hypothyroidism, HLD, CHF, CKD, DMII, chronic systolic and diastolic heart failure, Afib with RVR presented from [facility] with complaint of acute abdominal/suprapubic pain onset two days ago associated with nausea.    Sepsis secondary to Urinary Tract Infection  - febrile, AMS, elevated lactate 4.6, leukocytosis: 24 on admission  - Patient currently vitally stable, although has episodes of confusion (per nursing home, patient is AAOx2 at baseline with bouts of confusion)  - s/p 2L LR  - UCX, BCX pending  - CT Abdomen: + fecal impaction with large stool burden, Urinary Tract Infection, no CT evidence of pyelonephritis  - CXR: unremarkable  - f/u repeat cbc, lactate  - Given hx of UTI, DM, CKD, will cover patient with vancomycin and cefepime for now    DKA  - Blood glucose 453, elevated Beta Hydroxybutyrate elevated   - Currently on pushes of insulin  - Monitor BMP   - Replete K as needed    Abdominal pain likely secondary to large stool burden + DKA  - CTAP: distended stool filled rectum measuring 10.5 cm  - KUB: large stool burden, non obstructive  - s/p fleet enema with large BM this am with symptomatic relief    OTTO on CKD 4  - baseline creatinine around 1.1, creatinine 1.8  - Ni hydronephrosis on CTAP  - f/u repeat bmp  - s/p 2L LR, consider light hydration in setting of CHF (last echo 10/19: EF 35-40%)    Atrial Fibrillation w RVR  - continue diltiazem   - will hold digoxin in light of DKA and OTTO  - not candidate for Ac    Chronic systolic and diastolic HF  - CXR: no cardiopulmonary disease  - c/w furosemide 20 mg PO daily    HTN  - controlled, continue current meds    Code status: DNR/DNI  Diet: carb consistent renal  dispo: acute, from [facility]  ** spoke with daughter at bedside, daughter made aware of patient's poor prognosis, patient is DNR/DNI (MOLST) in chart. Family agreeable to meet with palliative today to discuss further goals of care** 89 yo with PMH of hypertension, gastric ulcer, hypothyroidism, HLD, CHF, CKD, DMII, chronic systolic and diastolic heart failure, Afib with RVR presented from [facility] with complaint of acute abdominal/suprapubic pain onset two days ago associated with nausea.    Se rosie Sepsis possible due to colitis:   - febrile, AMS, elevated lactate 4.6, leukocytosis: 24 on admission  - Patient currently vitally stable, although has episodes of confusion (per nursing home, patient is AAOx2 at baseline with bouts of confusion)  - s/p 2L LR  - UCX, BCX pending  - CT Abdomen: + fecal impaction with large stool burden, Urinary Tract Infection, no CT evidence of pyelonephritis  - CXR: unremarkable  - f/u repeat cbc, lactate  - Given hx of UTI, DM, CKD, will cover patient with vancomycin and cefepime for now    DKA  - Blood glucose 453, elevated Beta Hydroxybutyrate elevated   - Currently on pushes of insulin  - Monitor BMP   - Replete K as needed    Abdominal pain likely secondary to large stool burden + DKA  - CTAP: distended stool filled rectum measuring 10.5 cm  - KUB: large stool burden, non obstructive  - s/p fleet enema with large BM this am with symptomatic relief    OTTO on CKD 4  - baseline creatinine around 1.1, creatinine 1.8  - Ni hydronephrosis on CTAP  - f/u repeat bmp  - s/p 2L LR, consider light hydration in setting of CHF (last echo 10/19: EF 35-40%)    Atrial Fibrillation w RVR  - continue diltiazem   - will hold digoxin in light of DKA and OTTO  - not candidate for Ac    Chronic systolic and diastolic HF  - CXR: no cardiopulmonary disease  - c/w furosemide 20 mg PO daily    HTN  - controlled, continue current meds    Code status: DNR/DNI  Diet: carb consistent renal  dispo: acute, from [facility]  ** spoke with daughter at bedside, daughter made aware of patient's poor prognosis, patient is DNR/DNI (MOLST) in chart. Family agreeable to meet with palliative today to discuss further goals of care**

## 2020-01-10 NOTE — CONSULT NOTE ADULT - SUBJECTIVE AND OBJECTIVE BOX
REQUESTED OF: DR Mccann    CLINICAL ISSUE TO BE EVALUATED BY CONSULTANT: Goals of care         JACKSON DAVENPORT 89yFemale  HPI:  89 yo with PMH of hypertension, gastric ulcer, hypothyroidism, HLD, CHF, CKD, DMII, chronic systolic and diastolic heart failure, Afib with RVR, presents to the ED from nursing home with chief complaint of acute, worsening abdominal pain for the past two days associated with nausea. Patient is currently AAOx1 and a poor historian. Collateral information obtained from [facility], patient is AAOx2 at baseline with bouts of confusion.  Patient also complains of suprapubic pain and states that this pain has been there for a long time. Patient was finishing a course of microbid for recently diagnosed UTI. She denies any acute chest pain, sob, diarrhea dizziness.     ED: BP:136/70, HR:72 regular, RR:15, Temp: 100.6, KUB: large stool burden. CT Abdomen in ED: showed distended rectum with fecal impaction, urinary tract infection, Lactate: 4.6 (trending up)on admission, creatinine elevated to 1.8 (baseline ~1.2),  Patient s/p 2 L LR, Zosyn and Vancomycin. Patient had large BM after fleet enema this am, with mild relief of abdominal pain. (10 Rogelio 2020 09:07)        PAST MEDICAL & SURGICAL HISTORY:  Afib  History of cholecystectomy  H/O abdominal hysterectomy  Gastric ulcer  Hypothyroid  DM (diabetes mellitus) type II controlled peripheral vascular disorder  Hyperlipidemia  Hypertension  H/O shoulder surgery: left side      Case d.w admitting resident, events noted. Chart reviewed, ICU input noted.  Pt seen in ED. Lethargic, arousable, c/o feeling cold. Denies pain, no noted distress.        PHYSICAL EXAM:  Elderly female, lethargic, pale, dry mucosa, oriented x 1  PERRL  RRR  Abdom soft  no edema  +liang w dark urine      T(C): 35.9, Max: 38.1 (00:09)  HR: 65 (64 - 78)  BP: 96/55 (96/55 - 136/89)  RR: 18 (18 - 22)  SpO2: 96% (96% - 100%)      LABS/STUDIES:  01-10    139  |  95<L>  |  51<H>  ----------------------------<  330<H>  4.5   |  14<L>  |  2.1<H>    Ca    8.5      10 Rogelio 2020 12:06  Mg     2.5     01-10    TPro  5.7<L>  /  Alb  3.2<L>  /  TBili  0.5  /  DBili  x   /  AST  61<H>  /  ALT  27  /  AlkPhos  111  01-10                            10.2   24.73 )-----------( 258      ( 10 Rogelio 2020 12:06 )             32.8       MEDICATIONS  (STANDING):  aspirin enteric coated 81 milliGRAM(s) Oral daily  dextrose 5%. 1000 milliLiter(s) (50 mL/Hr) IV Continuous <Continuous>  dextrose 50% Injectable 12.5 Gram(s) IV Push once  dextrose 50% Injectable 25 Gram(s) IV Push once  dextrose 50% Injectable 25 Gram(s) IV Push once  diltiazem    Tablet 30 milliGRAM(s) Oral every 8 hours  furosemide    Tablet 20 milliGRAM(s) Oral daily  heparin  Injectable 5000 Unit(s) SubCutaneous every 12 hours  influenza   Vaccine 0.5 milliLiter(s) IntraMuscular once  insulin glargine Injectable (LANTUS) 30 Unit(s) SubCutaneous at bedtime  insulin lispro Injectable (HumaLOG) 10 Unit(s) SubCutaneous three times a day with meals  levothyroxine 50 MICROGram(s) Oral daily  meropenem  IVPB 1000 milliGRAM(s) IV Intermittent every 12 hours  metroNIDAZOLE  IVPB 500 milliGRAM(s) IV Intermittent every 8 hours  pantoprazole    Tablet 40 milliGRAM(s) Oral before breakfast  sodium chloride 0.9%. 1000 milliLiter(s) (100 mL/Hr) IV Continuous <Continuous>    MEDICATIONS  (PRN):  dextrose 40% Gel 15 Gram(s) Oral once PRN Blood Glucose LESS THAN 70 milliGRAM(s)/deciliter  glucagon  Injectable 1 milliGRAM(s) IntraMuscular once PRN Glucose LESS THAN 70 milligrams/deciliter        [place] Symptom Assesment Scale      PPS (Palliative Performance Scale): 10

## 2020-01-10 NOTE — CONSULT NOTE ADULT - ASSESSMENT
IMPRESSION    Severe Sepsis likely 2/2 colitis vs UTI   Left hydroureter   OTTO on CKD  Afib not on AC  HfRef      PLAN:    CNS: Avoid CNS depressant    HEENT: Oral care    PULMONARY:  HOB @ 45 degrees, aspiration precautions     CARDIOVASCULAR: Keep i=0, Avoid Volume Overload, D/C IVF . Repeat lactate    GI: GI prophylaxis.  Feeding as per speech swallow    RENAL: repeat bmp .  Correct as needed,  liang insertion .Monitor Strict Is and Os    INFECTIOUS DISEASE: Follow up pan cultures . meropenem  and flagyl . Send C diff     HEMATOLOGICAL:  DVT prophylaxis.    ENDOCRINE: Increase Lantus to 30 , lispro 10 q8  .  Follow up FS.     MUSCULOSKELETAL: OOB to chair    Patient not a MICU candidate for now     Extremely Poor Prognosis   Consider Palliative care consult   Spoke at length with daughter at bedside

## 2020-01-11 LAB
ANION GAP SERPL CALC-SCNC: 16 MMOL/L — HIGH (ref 7–14)
ANION GAP SERPL CALC-SCNC: 17 MMOL/L — HIGH (ref 7–14)
BASOPHILS # BLD AUTO: 0.03 K/UL — SIGNIFICANT CHANGE UP (ref 0–0.2)
BASOPHILS NFR BLD AUTO: 0.1 % — SIGNIFICANT CHANGE UP (ref 0–1)
BUN SERPL-MCNC: 50 MG/DL — HIGH (ref 10–20)
BUN SERPL-MCNC: 54 MG/DL — HIGH (ref 10–20)
CALCIUM SERPL-MCNC: 7.4 MG/DL — LOW (ref 8.5–10.1)
CALCIUM SERPL-MCNC: 7.7 MG/DL — LOW (ref 8.5–10.1)
CHLORIDE SERPL-SCNC: 108 MMOL/L — SIGNIFICANT CHANGE UP (ref 98–110)
CHLORIDE SERPL-SCNC: 109 MMOL/L — SIGNIFICANT CHANGE UP (ref 98–110)
CO2 SERPL-SCNC: 17 MMOL/L — SIGNIFICANT CHANGE UP (ref 17–32)
CO2 SERPL-SCNC: 18 MMOL/L — SIGNIFICANT CHANGE UP (ref 17–32)
CREAT SERPL-MCNC: 2 MG/DL — HIGH (ref 0.7–1.5)
CREAT SERPL-MCNC: 2 MG/DL — HIGH (ref 0.7–1.5)
EOSINOPHIL # BLD AUTO: 0.03 K/UL — SIGNIFICANT CHANGE UP (ref 0–0.7)
EOSINOPHIL NFR BLD AUTO: 0.1 % — SIGNIFICANT CHANGE UP (ref 0–8)
ESTIMATED AVERAGE GLUCOSE: 183 MG/DL — HIGH (ref 68–114)
GLUCOSE BLDC GLUCOMTR-MCNC: 183 MG/DL — HIGH (ref 70–99)
GLUCOSE BLDC GLUCOMTR-MCNC: 211 MG/DL — HIGH (ref 70–99)
GLUCOSE BLDC GLUCOMTR-MCNC: 318 MG/DL — HIGH (ref 70–99)
GLUCOSE BLDC GLUCOMTR-MCNC: 50 MG/DL — LOW (ref 70–99)
GLUCOSE BLDC GLUCOMTR-MCNC: 56 MG/DL — LOW (ref 70–99)
GLUCOSE BLDC GLUCOMTR-MCNC: 84 MG/DL — SIGNIFICANT CHANGE UP (ref 70–99)
GLUCOSE SERPL-MCNC: 227 MG/DL — HIGH (ref 70–99)
GLUCOSE SERPL-MCNC: 38 MG/DL — CRITICAL LOW (ref 70–99)
HBA1C BLD-MCNC: 8 % — HIGH (ref 4–5.6)
HCT VFR BLD CALC: 31.8 % — LOW (ref 37–47)
HCT VFR BLD CALC: 32.4 % — LOW (ref 37–47)
HGB BLD-MCNC: 9.8 G/DL — LOW (ref 12–16)
HGB BLD-MCNC: 9.9 G/DL — LOW (ref 12–16)
IMM GRANULOCYTES NFR BLD AUTO: 1.1 % — HIGH (ref 0.1–0.3)
LACTATE SERPL-SCNC: 1.7 MMOL/L — SIGNIFICANT CHANGE UP (ref 0.7–2)
LACTATE SERPL-SCNC: 3.6 MMOL/L — HIGH (ref 0.7–2)
LYMPHOCYTES # BLD AUTO: 1.89 K/UL — SIGNIFICANT CHANGE UP (ref 1.2–3.4)
LYMPHOCYTES # BLD AUTO: 8.9 % — LOW (ref 20.5–51.1)
MAGNESIUM SERPL-MCNC: 2.1 MG/DL — SIGNIFICANT CHANGE UP (ref 1.8–2.4)
MCHC RBC-ENTMCNC: 29.1 PG — SIGNIFICANT CHANGE UP (ref 27–31)
MCHC RBC-ENTMCNC: 29.7 PG — SIGNIFICANT CHANGE UP (ref 27–31)
MCHC RBC-ENTMCNC: 30.6 G/DL — LOW (ref 32–37)
MCHC RBC-ENTMCNC: 30.8 G/DL — LOW (ref 32–37)
MCV RBC AUTO: 94.4 FL — SIGNIFICANT CHANGE UP (ref 81–99)
MCV RBC AUTO: 97.3 FL — SIGNIFICANT CHANGE UP (ref 81–99)
MONOCYTES # BLD AUTO: 1.41 K/UL — HIGH (ref 0.1–0.6)
MONOCYTES NFR BLD AUTO: 6.6 % — SIGNIFICANT CHANGE UP (ref 1.7–9.3)
NEUTROPHILS # BLD AUTO: 17.72 K/UL — HIGH (ref 1.4–6.5)
NEUTROPHILS NFR BLD AUTO: 83.2 % — HIGH (ref 42.2–75.2)
NRBC # BLD: 0 /100 WBCS — SIGNIFICANT CHANGE UP (ref 0–0)
NRBC # BLD: 0 /100 WBCS — SIGNIFICANT CHANGE UP (ref 0–0)
PHOSPHATE SERPL-MCNC: 4.5 MG/DL — SIGNIFICANT CHANGE UP (ref 2.1–4.9)
PLATELET # BLD AUTO: 210 K/UL — SIGNIFICANT CHANGE UP (ref 130–400)
PLATELET # BLD AUTO: 222 K/UL — SIGNIFICANT CHANGE UP (ref 130–400)
POTASSIUM SERPL-MCNC: 3.7 MMOL/L — SIGNIFICANT CHANGE UP (ref 3.5–5)
POTASSIUM SERPL-MCNC: 4.2 MMOL/L — SIGNIFICANT CHANGE UP (ref 3.5–5)
POTASSIUM SERPL-SCNC: 3.7 MMOL/L — SIGNIFICANT CHANGE UP (ref 3.5–5)
POTASSIUM SERPL-SCNC: 4.2 MMOL/L — SIGNIFICANT CHANGE UP (ref 3.5–5)
RBC # BLD: 3.33 M/UL — LOW (ref 4.2–5.4)
RBC # BLD: 3.37 M/UL — LOW (ref 4.2–5.4)
RBC # FLD: 15 % — HIGH (ref 11.5–14.5)
RBC # FLD: 15.2 % — HIGH (ref 11.5–14.5)
SODIUM SERPL-SCNC: 141 MMOL/L — SIGNIFICANT CHANGE UP (ref 135–146)
SODIUM SERPL-SCNC: 144 MMOL/L — SIGNIFICANT CHANGE UP (ref 135–146)
WBC # BLD: 21 K/UL — HIGH (ref 4.8–10.8)
WBC # BLD: 21.31 K/UL — HIGH (ref 4.8–10.8)
WBC # FLD AUTO: 21 K/UL — HIGH (ref 4.8–10.8)
WBC # FLD AUTO: 21.31 K/UL — HIGH (ref 4.8–10.8)

## 2020-01-11 PROCEDURE — 99233 SBSQ HOSP IP/OBS HIGH 50: CPT

## 2020-01-11 PROCEDURE — 74018 RADEX ABDOMEN 1 VIEW: CPT | Mod: 26

## 2020-01-11 RX ORDER — DEXTROSE 10 % IN WATER 10 %
500 INTRAVENOUS SOLUTION INTRAVENOUS ONCE
Refills: 0 | Status: COMPLETED | OUTPATIENT
Start: 2020-01-11 | End: 2020-01-11

## 2020-01-11 RX ORDER — MORPHINE SULFATE 50 MG/1
4 CAPSULE, EXTENDED RELEASE ORAL EVERY 6 HOURS
Refills: 0 | Status: DISCONTINUED | OUTPATIENT
Start: 2020-01-11 | End: 2020-01-15

## 2020-01-11 RX ORDER — INSULIN LISPRO 100/ML
7 VIAL (ML) SUBCUTANEOUS
Refills: 0 | Status: DISCONTINUED | OUTPATIENT
Start: 2020-01-11 | End: 2020-01-12

## 2020-01-11 RX ORDER — INSULIN GLARGINE 100 [IU]/ML
20 INJECTION, SOLUTION SUBCUTANEOUS AT BEDTIME
Refills: 0 | Status: DISCONTINUED | OUTPATIENT
Start: 2020-01-11 | End: 2020-01-12

## 2020-01-11 RX ORDER — VANCOMYCIN HCL 1 G
750 VIAL (EA) INTRAVENOUS EVERY 24 HOURS
Refills: 0 | Status: DISCONTINUED | OUTPATIENT
Start: 2020-01-11 | End: 2020-01-14

## 2020-01-11 RX ADMIN — MORPHINE SULFATE 2 MILLIGRAM(S): 50 CAPSULE, EXTENDED RELEASE ORAL at 21:40

## 2020-01-11 RX ADMIN — Medication 100 MILLIGRAM(S): at 05:11

## 2020-01-11 RX ADMIN — MORPHINE SULFATE 4 MILLIGRAM(S): 50 CAPSULE, EXTENDED RELEASE ORAL at 22:37

## 2020-01-11 RX ADMIN — MEROPENEM 100 MILLIGRAM(S): 1 INJECTION INTRAVENOUS at 05:10

## 2020-01-11 RX ADMIN — Medication 50 MICROGRAM(S): at 05:12

## 2020-01-11 RX ADMIN — MORPHINE SULFATE 2 MILLIGRAM(S): 50 CAPSULE, EXTENDED RELEASE ORAL at 09:44

## 2020-01-11 RX ADMIN — PANTOPRAZOLE SODIUM 40 MILLIGRAM(S): 20 TABLET, DELAYED RELEASE ORAL at 05:15

## 2020-01-11 RX ADMIN — Medication 1000 MILLILITER(S): at 09:38

## 2020-01-11 RX ADMIN — HEPARIN SODIUM 5000 UNIT(S): 5000 INJECTION INTRAVENOUS; SUBCUTANEOUS at 05:12

## 2020-01-11 RX ADMIN — SODIUM CHLORIDE 100 MILLILITER(S): 9 INJECTION INTRAMUSCULAR; INTRAVENOUS; SUBCUTANEOUS at 05:12

## 2020-01-11 RX ADMIN — MORPHINE SULFATE 4 MILLIGRAM(S): 50 CAPSULE, EXTENDED RELEASE ORAL at 22:48

## 2020-01-11 RX ADMIN — HEPARIN SODIUM 5000 UNIT(S): 5000 INJECTION INTRAVENOUS; SUBCUTANEOUS at 18:41

## 2020-01-11 RX ADMIN — Medication 0.25 MILLIGRAM(S): at 05:12

## 2020-01-11 RX ADMIN — Medication 30 MILLIGRAM(S): at 05:12

## 2020-01-11 RX ADMIN — Medication 20 MILLIGRAM(S): at 05:11

## 2020-01-11 RX ADMIN — MORPHINE SULFATE 4 MILLIGRAM(S): 50 CAPSULE, EXTENDED RELEASE ORAL at 15:03

## 2020-01-11 RX ADMIN — Medication 7 UNIT(S): at 22:18

## 2020-01-11 RX ADMIN — MORPHINE SULFATE 2 MILLIGRAM(S): 50 CAPSULE, EXTENDED RELEASE ORAL at 21:22

## 2020-01-11 RX ADMIN — MEROPENEM 100 MILLIGRAM(S): 1 INJECTION INTRAVENOUS at 18:41

## 2020-01-11 RX ADMIN — Medication 0.25 MILLIGRAM(S): at 11:16

## 2020-01-11 RX ADMIN — MORPHINE SULFATE 2 MILLIGRAM(S): 50 CAPSULE, EXTENDED RELEASE ORAL at 12:11

## 2020-01-11 RX ADMIN — Medication 250 MILLIGRAM(S): at 18:42

## 2020-01-11 RX ADMIN — INSULIN GLARGINE 20 UNIT(S): 100 INJECTION, SOLUTION SUBCUTANEOUS at 22:17

## 2020-01-11 NOTE — PROGRESS NOTE ADULT - SUBJECTIVE AND OBJECTIVE BOX
JACKSON DAVENPORT  89y  Female      Patient is a 89y old  Female who presents with a chief complaint of Abdominal pain (11 Jan 2020 12:48)      INTERVAL HPI/OVERNIGHT EVENTS:  Still lethargic, aurosable.   Vital Signs Last 24 Hrs  T(C): 37.1 (11 Jan 2020 07:45), Max: 37.1 (11 Jan 2020 07:45)  T(F): 98.7 (11 Jan 2020 07:45), Max: 98.7 (11 Jan 2020 07:45)  HR: 52 (11 Jan 2020 07:45) (52 - 79)  BP: 145/76 (11 Jan 2020 07:45) (111/54 - 159/74)  BP(mean): --  RR: 18 (11 Jan 2020 07:45) (18 - 18)  SpO2: 98% (11 Jan 2020 07:45) (96% - 100%)            Consultant(s) Notes Reviewed:  [x ] YES  [ ] NO          MEDICATIONS  (STANDING):  aspirin enteric coated 81 milliGRAM(s) Oral daily  dextrose 5%. 1000 milliLiter(s) (50 mL/Hr) IV Continuous <Continuous>  dextrose 50% Injectable 12.5 Gram(s) IV Push once  dextrose 50% Injectable 25 Gram(s) IV Push once  dextrose 50% Injectable 25 Gram(s) IV Push once  diltiazem    Tablet 30 milliGRAM(s) Oral every 8 hours  furosemide    Tablet 20 milliGRAM(s) Oral daily  heparin  Injectable 5000 Unit(s) SubCutaneous every 12 hours  influenza   Vaccine 0.5 milliLiter(s) IntraMuscular once  insulin glargine Injectable (LANTUS) 30 Unit(s) SubCutaneous at bedtime  insulin lispro Injectable (HumaLOG) 10 Unit(s) SubCutaneous three times a day with meals  levothyroxine 50 MICROGram(s) Oral daily  LORazepam   Injectable 0.25 milliGRAM(s) IV Push every 6 hours  meropenem  IVPB 1000 milliGRAM(s) IV Intermittent every 12 hours  pantoprazole    Tablet 40 milliGRAM(s) Oral before breakfast  sodium chloride 0.9%. 1000 milliLiter(s) (100 mL/Hr) IV Continuous <Continuous>  vancomycin  IVPB 750 milliGRAM(s) IV Intermittent every 24 hours    MEDICATIONS  (PRN):  dextrose 40% Gel 15 Gram(s) Oral once PRN Blood Glucose LESS THAN 70 milliGRAM(s)/deciliter  glucagon  Injectable 1 milliGRAM(s) IntraMuscular once PRN Glucose LESS THAN 70 milligrams/deciliter  morphine  - Injectable 4 milliGRAM(s) IV Push every 6 hours PRN Severe Pain (7 - 10)  morphine  - Injectable 2 milliGRAM(s) IV Push every 3 hours PRN Moderate Pain (4 - 6)      LABS                          9.8    21.00 )-----------( 222      ( 11 Jan 2020 07:25 )             31.8     01-11    144  |  109  |  50<H>  ----------------------------<  38<LL>  3.7   |  18  |  2.0<H>    Ca    7.4<L>      11 Jan 2020 07:25  Phos  4.5     01-11  Mg     2.1     01-11    TPro  5.7<L>  /  Alb  3.2<L>  /  TBili  0.5  /  DBili  x   /  AST  61<H>  /  ALT  27  /  AlkPhos  111  01-10        PT/INR - ( 09 Jan 2020 23:50 )   PT: 15.50 sec;   INR: 1.35 ratio         PTT - ( 09 Jan 2020 23:50 )  PTT:27.1 sec  Lactate Trend  01-11 @ 07:25 Lactate:1.7   01-10 @ 12:06 Lactate:8.5     CARDIAC MARKERS ( 10 Rogelio 2020 12:06 )  x     / 0.18 ng/mL / x     / x     / x      CARDIAC MARKERS ( 09 Jan 2020 23:50 )  x     / 0.20 ng/mL / x     / x     / x          CAPILLARY BLOOD GLUCOSE      POCT Blood Glucose.: 183 mg/dL (11 Jan 2020 12:18)      Culture - Blood (collected 01-10-20 @ 00:50)  Source: .Blood Blood  Preliminary Report (01-11-20 @ 07:01):    No growth to date.    Culture - Blood (collected 01-10-20 @ 00:50)  Source: .Blood Blood  Preliminary Report (01-11-20 @ 07:01):    No growth to date.        RADIOLOGY & ADDITIONAL TESTS:    Imaging Personally Reviewed:  [ ] YES  [ ] NO    HEALTH ISSUES - PROBLEM Dx:        PHYSICAL EXAM:  GENERAL: lethargic, in pain, mild distress.   HEAD:  Atraumatic, Normocephalic  EYES: EOMI, PERRLA, conjunctiva and sclera clear  NECK: Supple, No JVD  CHEST/LUNG: Clear to auscultation bilaterally; No wheeze  HEART: Irregular rate and rhythm; S1 S2  ABDOMEN: distended, moderate tenderness with guarding.   EXTREMITIES:  2+ Peripheral Pulses, No clubbing, cyanosis, or edema  PSYCH: AAOx1  NEUROLOGY: non-focal

## 2020-01-11 NOTE — PROGRESS NOTE ADULT - SUBJECTIVE AND OBJECTIVE BOX
SUBJECTIVE:    Patient is a 89y old Female who presents with a chief complaint of Abdominal pain (10 Rogelio 2020 14:44)    Currently admitted to medicine with the primary diagnosis of Sepsis     Today is hospital day 1d.   patient obtunded     PAST MEDICAL & SURGICAL HISTORY  Afib  History of cholecystectomy  H/O abdominal hysterectomy  Gastric ulcer  Hypothyroid  DM (diabetes mellitus) type II controlled peripheral vascular disorder  Hyperlipidemia  Hypertension  H/O shoulder surgery: left side    ALLERGIES:  ciprofloxacin (Unknown)  Flagyl (Hives)    MEDICATIONS:  STANDING MEDICATIONS  aspirin enteric coated 81 milliGRAM(s) Oral daily  dextrose 5%. 1000 milliLiter(s) IV Continuous <Continuous>  dextrose 50% Injectable 12.5 Gram(s) IV Push once  dextrose 50% Injectable 25 Gram(s) IV Push once  dextrose 50% Injectable 25 Gram(s) IV Push once  diltiazem    Tablet 30 milliGRAM(s) Oral every 8 hours  furosemide    Tablet 20 milliGRAM(s) Oral daily  heparin  Injectable 5000 Unit(s) SubCutaneous every 12 hours  influenza   Vaccine 0.5 milliLiter(s) IntraMuscular once  insulin glargine Injectable (LANTUS) 30 Unit(s) SubCutaneous at bedtime  insulin lispro Injectable (HumaLOG) 10 Unit(s) SubCutaneous three times a day with meals  levothyroxine 50 MICROGram(s) Oral daily  LORazepam   Injectable 0.25 milliGRAM(s) IV Push every 6 hours  meropenem  IVPB 1000 milliGRAM(s) IV Intermittent every 12 hours  metroNIDAZOLE  IVPB 500 milliGRAM(s) IV Intermittent every 8 hours  pantoprazole    Tablet 40 milliGRAM(s) Oral before breakfast  sodium chloride 0.9%. 1000 milliLiter(s) IV Continuous <Continuous>    PRN MEDICATIONS  dextrose 40% Gel 15 Gram(s) Oral once PRN  glucagon  Injectable 1 milliGRAM(s) IntraMuscular once PRN  morphine  - Injectable 2 milliGRAM(s) IV Push every 3 hours PRN    VITALS:   T(F): 98.7  HR: 52  BP: 145/76  RR: 18  SpO2: 98%    LABS:                        9.8    21.00 )-----------( 222      ( 11 Jan 2020 07:25 )             31.8     01-11    144  |  109  |  50<H>  ----------------------------<  38<LL>  3.7   |  18  |  2.0<H>    Ca    7.4<L>      11 Jan 2020 07:25  Phos  4.5     01-11  Mg     2.1     01-11    TPro  5.7<L>  /  Alb  3.2<L>  /  TBili  0.5  /  DBili  x   /  AST  61<H>  /  ALT  27  /  AlkPhos  111  01-10    PT/INR - ( 09 Jan 2020 23:50 )   PT: 15.50 sec;   INR: 1.35 ratio         PTT - ( 09 Jan 2020 23:50 )  PTT:27.1 sec      Lactate, Blood: 1.7 mmol/L (01-11-20 @ 07:25)      Culture - Blood (collected 10 Rogelio 2020 00:50)  Source: .Blood Blood  Preliminary Report (11 Jan 2020 07:01):    No growth to date.    Culture - Blood (collected 10 Rogelio 2020 00:50)  Source: .Blood Blood  Preliminary Report (11 Jan 2020 07:01):    No growth to date.      CARDIAC MARKERS ( 10 Rogelio 2020 12:06 )  x     / 0.18 ng/mL / x     / x     / x      CARDIAC MARKERS ( 09 Jan 2020 23:50 )  x     / 0.20 ng/mL / x     / x     / x          PHYSICAL EXAM:  GEN: obtunded  LUNGS: decreased Breath sounds  HEART: Regular  ABD: distended mildly, BS+  EXT: no edema   NEURO: AAOX 0, moves all extremity     Indwelling Urethral Catheter:     Connect To:  Straight Drainage/Gravity    Indication:  Urine Output Monitoring in Critically Ill (01-11-20 @ 02:53) (not performed) [active]

## 2020-01-11 NOTE — PROGRESS NOTE ADULT - ASSESSMENT
A 89 year old female with PMH of HTN, DM type 2, Atrial fibrillation, PUD and HFrEF was brought to ED from Nursing home for lethargy, abdominal pain and change in her mental status, patient is poor historian and confused, as per NH patient started with abdominal pain for two days with nausea, constipation, no fever. She was treated recently with Macrobid for UTI.  In the ED: BP:136/70, HR:72 regular, RR:15, Temp: 100.6, KUB: large stool burden. CT Abdomen in ED: showed distended rectum with fecal impaction, urinary tract infection, mild colitis and diverticulitis. Lactate: 4.6 (trending up)on admission, creatinine elevated to 1.8 (baseline ~1.2),       A/P:   Severe Sepsis: possible due to colitis and or UTI  Severe Lactic acidosis: worsening lactate 8   UA is negative, abdomen CT showed no evidence of acute inflammatory process, possible urinary tract infection (left renal fullness and proximal hydroureter. Possible mild, uncomplicated colitis or diverticulitis.  UA is negative*(she was recently treated with Macrobid).   Abdomen is very tender, rigid on exam.   CXR is clear. WBC 21K, lactic acid is elevated. OTTO,  Continue Meropenem and Vancomycin. IV fluid 100CC/hr.    OTTO: possible pre-renal ot ATN  Continue on IV fluid, monitor I/O    Metabolic encephalopathy: due to sepsis  High anion gap metabolic acidosis:   from lactic acidosis and DKA, hyperglycemia  Continue IV fluid, Lantus.    Chronic HFrEF:   Continue Lasix. No signs of volume overload  Caution with IV fluid.    Patient is DNR/DNI, poor prognosis, will discuss with family palliative care and comfort measures.    Goal of cares discussed with family, her daughter wants to try with antibiotics for few days, I discussed comfort measure if no improvement in next 24 to 48 hrs.     #Progress Note Handoff:  Pending (specify):  improving sepsis, possible switch to comfort measure.   Family discussion: with her daughter as above.   Disposition: Unknown.

## 2020-01-11 NOTE — PROGRESS NOTE ADULT - ASSESSMENT
89 yo with PMH of hypertension, gastric ulcer, hypothyroidism, HLD, CHF, CKD, DMII, chronic systolic and diastolic heart failure, Afib with RVR presented from [facility] with complaint of acute abdominal/suprapubic pain onset two days ago associated with nausea.    Severe Sepsis possible Complicated UTI   - persistent elevated Lactate  - patient symptomatically worse, obtunded  - CT Abdomen: + fecal impaction with large stool burden, Urinary Tract Infection  - CXR: unremarkable  - follow up lactate  - continue with IV meropenem and flagyl  - will add vancomycin  Patient DNR/DNI, and family more towards comfort measures while continuing medical treatment, if no improvement over the day then comfort measures only    OTTO on CKD 4  - baseline creatinine around 1.1, creatinine 2  - f/u repeat bmp  - continue hydration    Atrial Fibrillation w RVR not on anticoagulation  - continue diltiazem   - will hold digoxin in light of OTTO    Chronic systolic and diastolic HF  - no cardiopulmonary disease  - not on acute failure  - c/w furosemide 20 mg PO daily    HTN  - controlled, continue current meds    Code status: DNR/DNI  Diet: carb consistent renal  dispo: acute, from [facility]  ** spoke with daughter at bedside, daughter made aware of patient's poor prognosis, patient is DNR/DNI (MOLST) in chart.  Patient looks terminal

## 2020-01-12 LAB
ANION GAP SERPL CALC-SCNC: 16 MMOL/L — HIGH (ref 7–14)
BASOPHILS # BLD AUTO: 0.04 K/UL — SIGNIFICANT CHANGE UP (ref 0–0.2)
BASOPHILS NFR BLD AUTO: 0.2 % — SIGNIFICANT CHANGE UP (ref 0–1)
BUN SERPL-MCNC: 55 MG/DL — HIGH (ref 10–20)
CALCIUM SERPL-MCNC: 7.3 MG/DL — LOW (ref 8.5–10.1)
CHLORIDE SERPL-SCNC: 113 MMOL/L — HIGH (ref 98–110)
CO2 SERPL-SCNC: 16 MMOL/L — LOW (ref 17–32)
CREAT SERPL-MCNC: 2 MG/DL — HIGH (ref 0.7–1.5)
CULTURE RESULTS: NO GROWTH — SIGNIFICANT CHANGE UP
EOSINOPHIL # BLD AUTO: 0.03 K/UL — SIGNIFICANT CHANGE UP (ref 0–0.7)
EOSINOPHIL NFR BLD AUTO: 0.1 % — SIGNIFICANT CHANGE UP (ref 0–8)
GLUCOSE BLDC GLUCOMTR-MCNC: 103 MG/DL — HIGH (ref 70–99)
GLUCOSE BLDC GLUCOMTR-MCNC: 173 MG/DL — HIGH (ref 70–99)
GLUCOSE BLDC GLUCOMTR-MCNC: 24 MG/DL — CRITICAL LOW (ref 70–99)
GLUCOSE BLDC GLUCOMTR-MCNC: 27 MG/DL — CRITICAL LOW (ref 70–99)
GLUCOSE BLDC GLUCOMTR-MCNC: 32 MG/DL — CRITICAL LOW (ref 70–99)
GLUCOSE BLDC GLUCOMTR-MCNC: 33 MG/DL — CRITICAL LOW (ref 70–99)
GLUCOSE BLDC GLUCOMTR-MCNC: 35 MG/DL — CRITICAL LOW (ref 70–99)
GLUCOSE BLDC GLUCOMTR-MCNC: 49 MG/DL — LOW (ref 70–99)
GLUCOSE BLDC GLUCOMTR-MCNC: 54 MG/DL — LOW (ref 70–99)
GLUCOSE BLDC GLUCOMTR-MCNC: 56 MG/DL — LOW (ref 70–99)
GLUCOSE BLDC GLUCOMTR-MCNC: 58 MG/DL — LOW (ref 70–99)
GLUCOSE BLDC GLUCOMTR-MCNC: 60 MG/DL — LOW (ref 70–99)
GLUCOSE BLDC GLUCOMTR-MCNC: 84 MG/DL — SIGNIFICANT CHANGE UP (ref 70–99)
GLUCOSE BLDC GLUCOMTR-MCNC: 87 MG/DL — SIGNIFICANT CHANGE UP (ref 70–99)
GLUCOSE SERPL-MCNC: 3 MG/DL — CRITICAL LOW (ref 70–99)
HCT VFR BLD CALC: 30.6 % — LOW (ref 37–47)
HGB BLD-MCNC: 9.8 G/DL — LOW (ref 12–16)
IMM GRANULOCYTES NFR BLD AUTO: 1.1 % — HIGH (ref 0.1–0.3)
LACTATE SERPL-SCNC: 1.7 MMOL/L — SIGNIFICANT CHANGE UP (ref 0.7–2)
LYMPHOCYTES # BLD AUTO: 10 % — LOW (ref 20.5–51.1)
LYMPHOCYTES # BLD AUTO: 2.02 K/UL — SIGNIFICANT CHANGE UP (ref 1.2–3.4)
MAGNESIUM SERPL-MCNC: 2.1 MG/DL — SIGNIFICANT CHANGE UP (ref 1.8–2.4)
MCHC RBC-ENTMCNC: 30.7 PG — SIGNIFICANT CHANGE UP (ref 27–31)
MCHC RBC-ENTMCNC: 32 G/DL — SIGNIFICANT CHANGE UP (ref 32–37)
MCV RBC AUTO: 95.9 FL — SIGNIFICANT CHANGE UP (ref 81–99)
MONOCYTES # BLD AUTO: 1.6 K/UL — HIGH (ref 0.1–0.6)
MONOCYTES NFR BLD AUTO: 7.9 % — SIGNIFICANT CHANGE UP (ref 1.7–9.3)
NEUTROPHILS # BLD AUTO: 16.27 K/UL — HIGH (ref 1.4–6.5)
NEUTROPHILS NFR BLD AUTO: 80.7 % — HIGH (ref 42.2–75.2)
NRBC # BLD: 1 /100 WBCS — HIGH (ref 0–0)
PHOSPHATE SERPL-MCNC: 4.8 MG/DL — SIGNIFICANT CHANGE UP (ref 2.1–4.9)
PLATELET # BLD AUTO: 191 K/UL — SIGNIFICANT CHANGE UP (ref 130–400)
POTASSIUM SERPL-MCNC: 4.3 MMOL/L — SIGNIFICANT CHANGE UP (ref 3.5–5)
POTASSIUM SERPL-SCNC: 4.3 MMOL/L — SIGNIFICANT CHANGE UP (ref 3.5–5)
RBC # BLD: 3.19 M/UL — LOW (ref 4.2–5.4)
RBC # FLD: 15.3 % — HIGH (ref 11.5–14.5)
SODIUM SERPL-SCNC: 145 MMOL/L — SIGNIFICANT CHANGE UP (ref 135–146)
SPECIMEN SOURCE: SIGNIFICANT CHANGE UP
WBC # BLD: 20.19 K/UL — HIGH (ref 4.8–10.8)
WBC # FLD AUTO: 20.19 K/UL — HIGH (ref 4.8–10.8)

## 2020-01-12 PROCEDURE — 99233 SBSQ HOSP IP/OBS HIGH 50: CPT

## 2020-01-12 RX ORDER — SODIUM CHLORIDE 9 MG/ML
1000 INJECTION INTRAMUSCULAR; INTRAVENOUS; SUBCUTANEOUS
Refills: 0 | Status: DISCONTINUED | OUTPATIENT
Start: 2020-01-12 | End: 2020-01-12

## 2020-01-12 RX ORDER — SODIUM CHLORIDE 9 MG/ML
1000 INJECTION, SOLUTION INTRAVENOUS
Refills: 0 | Status: DISCONTINUED | OUTPATIENT
Start: 2020-01-12 | End: 2020-01-15

## 2020-01-12 RX ORDER — DEXTROSE 10 % IN WATER 10 %
250 INTRAVENOUS SOLUTION INTRAVENOUS ONCE
Refills: 0 | Status: COMPLETED | OUTPATIENT
Start: 2020-01-12 | End: 2020-01-12

## 2020-01-12 RX ORDER — SODIUM CHLORIDE 9 MG/ML
1000 INJECTION, SOLUTION INTRAVENOUS
Refills: 0 | Status: DISCONTINUED | OUTPATIENT
Start: 2020-01-12 | End: 2020-01-12

## 2020-01-12 RX ORDER — DEXTROSE 10 % IN WATER 10 %
250 INTRAVENOUS SOLUTION INTRAVENOUS
Refills: 0 | Status: DISCONTINUED | OUTPATIENT
Start: 2020-01-12 | End: 2020-01-15

## 2020-01-12 RX ADMIN — SODIUM CHLORIDE 75 MILLILITER(S): 9 INJECTION, SOLUTION INTRAVENOUS at 20:26

## 2020-01-12 RX ADMIN — Medication 1000 MILLILITER(S): at 08:00

## 2020-01-12 RX ADMIN — MEROPENEM 100 MILLIGRAM(S): 1 INJECTION INTRAVENOUS at 17:25

## 2020-01-12 RX ADMIN — Medication 500 MILLILITER(S): at 20:15

## 2020-01-12 RX ADMIN — SODIUM CHLORIDE 75 MILLILITER(S): 9 INJECTION, SOLUTION INTRAVENOUS at 16:43

## 2020-01-12 RX ADMIN — HEPARIN SODIUM 5000 UNIT(S): 5000 INJECTION INTRAVENOUS; SUBCUTANEOUS at 17:25

## 2020-01-12 RX ADMIN — MORPHINE SULFATE 2 MILLIGRAM(S): 50 CAPSULE, EXTENDED RELEASE ORAL at 14:35

## 2020-01-12 RX ADMIN — Medication 250 MILLIGRAM(S): at 17:33

## 2020-01-12 RX ADMIN — MORPHINE SULFATE 2 MILLIGRAM(S): 50 CAPSULE, EXTENDED RELEASE ORAL at 14:01

## 2020-01-12 RX ADMIN — SODIUM CHLORIDE 100 MILLILITER(S): 9 INJECTION, SOLUTION INTRAVENOUS at 09:00

## 2020-01-12 RX ADMIN — Medication 1000 MILLILITER(S): at 13:00

## 2020-01-12 RX ADMIN — SODIUM CHLORIDE 100 MILLILITER(S): 9 INJECTION INTRAMUSCULAR; INTRAVENOUS; SUBCUTANEOUS at 00:26

## 2020-01-12 RX ADMIN — MEROPENEM 100 MILLIGRAM(S): 1 INJECTION INTRAVENOUS at 05:31

## 2020-01-12 RX ADMIN — SODIUM CHLORIDE 30 MILLILITER(S): 9 INJECTION, SOLUTION INTRAVENOUS at 13:00

## 2020-01-12 RX ADMIN — SODIUM CHLORIDE 35 MILLILITER(S): 9 INJECTION INTRAMUSCULAR; INTRAVENOUS; SUBCUTANEOUS at 11:06

## 2020-01-12 RX ADMIN — SODIUM CHLORIDE 75 MILLILITER(S): 9 INJECTION, SOLUTION INTRAVENOUS at 19:21

## 2020-01-12 RX ADMIN — HEPARIN SODIUM 5000 UNIT(S): 5000 INJECTION INTRAVENOUS; SUBCUTANEOUS at 05:27

## 2020-01-12 RX ADMIN — Medication 0.25 MILLIGRAM(S): at 00:12

## 2020-01-12 NOTE — PROGRESS NOTE ADULT - ASSESSMENT
A 89 year old female with PMH of HTN, DM type 2, Atrial fibrillation, PUD and HFrEF was brought to ED from Nursing home for lethargy, abdominal pain and change in her mental status, patient is poor historian and confused, as per NH patient started with abdominal pain for two days with nausea, constipation, no fever. She was treated recently with Macrobid for UTI.  In the ED: BP:136/70, HR:72 regular, RR:15, Temp: 100.6, KUB: large stool burden. CT Abdomen in ED: showed distended rectum with fecal impaction, urinary tract infection, mild colitis and diverticulitis. Lactate: 4.6 (trending up)on admission, creatinine elevated to 1.8 (baseline ~1.2),       A/P:   Severe Sepsis: possible due to colitis and or UTI  Severe Lactic acidosis: worsening lactate 8   UA is negative, abdomen CT showed no evidence of acute inflammatory process, possible urinary tract infection (left renal fullness and proximal hydroureter. Possible mild, uncomplicated colitis or diverticulitis.  UA is negative*(she was recently treated with Macrobid).   Abdomen is very tender, rigid on exam.   CXR is clear. WBC 21K, lactic acid is elevated. OTTO,  Continue Meropenem and Vancomycin. IV fluid 100CC/hr.    OTTO: possible pre-renal ot ATN  Continue on IV fluid, monitor I/O    Metabolic encephalopathy: due to sepsis, worsening  High anion gap metabolic acidosis:   from lactic acidosis and DKA, hyperglycemia  Continue IV fluid, Lantus.    Chronic HFrEF:   Continue Lasix. No signs of volume overload  Caution with IV fluid.    Patient is DNR/DNI, poor prognosis, I discussed with her daughter palliative care and comfort measures.    Goal of cares discussed with family, her daughter wants to try with antibiotics for few days, I discussed comfort measure if no improvement in next 24 to 48 hrs.     #Progress Note Handoff:  Pending (specify):  improving sepsis, possible switch to comfort measure.   Family discussion: with her daughter as above.   Disposition: Unknown.

## 2020-01-12 NOTE — PROGRESS NOTE ADULT - SUBJECTIVE AND OBJECTIVE BOX
JACKSON DAVENPORT  89y  Female      Patient is a 89y old  Female who presents with a chief complaint of Abdominal pain (11 Jan 2020 15:23)      INTERVAL HPI/OVERNIGHT EVENTS:  She is more lethargic, still with abdominal pain.   Vital Signs Last 24 Hrs  T(C): 35.9 (12 Jan 2020 14:21), Max: 36.7 (11 Jan 2020 16:23)  T(F): 96.7 (12 Jan 2020 14:21), Max: 98.1 (11 Jan 2020 16:23)  HR: 89 (12 Jan 2020 14:21) (83 - 110)  BP: 129/71 (12 Jan 2020 14:21) (104/55 - 129/71)  BP(mean): --  RR: 19 (12 Jan 2020 14:21) (18 - 19)  SpO2: 98% (12 Jan 2020 05:37) (98% - 99%)      01-11-20 @ 07:01  -  01-12-20 @ 07:00  --------------------------------------------------------  IN: 50 mL / OUT: 0 mL / NET: 50 mL    01-12-20 @ 07:01 - 01-12-20 @ 15:43  --------------------------------------------------------  IN: 0 mL / OUT: 100 mL / NET: -100 mL            Consultant(s) Notes Reviewed:  [x ] YES  [ ] NO          MEDICATIONS  (STANDING):  aspirin enteric coated 81 milliGRAM(s) Oral daily  dextrose 5% + sodium chloride 0.45%. 1000 milliLiter(s) (30 mL/Hr) IV Continuous <Continuous>  dextrose 5%. 1000 milliLiter(s) (50 mL/Hr) IV Continuous <Continuous>  dextrose 50% Injectable 12.5 Gram(s) IV Push once  dextrose 50% Injectable 25 Gram(s) IV Push once  dextrose 50% Injectable 25 Gram(s) IV Push once  diltiazem    Tablet 30 milliGRAM(s) Oral every 8 hours  furosemide    Tablet 20 milliGRAM(s) Oral daily  heparin  Injectable 5000 Unit(s) SubCutaneous every 12 hours  influenza   Vaccine 0.5 milliLiter(s) IntraMuscular once  levothyroxine 50 MICROGram(s) Oral daily  LORazepam   Injectable 0.25 milliGRAM(s) IV Push every 6 hours  meropenem  IVPB 1000 milliGRAM(s) IV Intermittent every 12 hours  pantoprazole    Tablet 40 milliGRAM(s) Oral before breakfast  vancomycin  IVPB 750 milliGRAM(s) IV Intermittent every 24 hours    MEDICATIONS  (PRN):  dextrose 40% Gel 15 Gram(s) Oral once PRN Blood Glucose LESS THAN 70 milliGRAM(s)/deciliter  glucagon  Injectable 1 milliGRAM(s) IntraMuscular once PRN Glucose LESS THAN 70 milligrams/deciliter  morphine  - Injectable 2 milliGRAM(s) IV Push every 3 hours PRN Moderate Pain (4 - 6)  morphine  - Injectable 4 milliGRAM(s) IV Push every 6 hours PRN Severe Pain (7 - 10)      LABS                          9.8    20.19 )-----------( 191      ( 12 Jan 2020 06:09 )             30.6     01-12    145  |  113<H>  |  55<H>  ----------------------------<  3<LL>  4.3   |  16<L>  |  2.0<H>    Ca    7.3<L>      12 Jan 2020 06:09  Phos  4.8     01-12  Mg     2.1     01-12            Lactate Trend  01-12 @ 06:09 Lactate:1.7   01-11 @ 18:07 Lactate:3.6   01-11 @ 07:25 Lactate:1.7   01-10 @ 12:06 Lactate:8.5         CAPILLARY BLOOD GLUCOSE      POCT Blood Glucose.: 103 mg/dL (12 Jan 2020 14:00)      Culture - Urine (collected 01-11-20 @ 02:10)  Source: .Urine Catheterized  Final Report (01-12-20 @ 15:12):    No growth    Culture - Blood (collected 01-10-20 @ 00:50)  Source: .Blood Blood  Preliminary Report (01-11-20 @ 07:01):    No growth to date.    Culture - Blood (collected 01-10-20 @ 00:50)  Source: .Blood Blood  Preliminary Report (01-11-20 @ 07:01):    No growth to date.        RADIOLOGY & ADDITIONAL TESTS:    Imaging Personally Reviewed:  [ ] YES  [ ] NO    HEALTH ISSUES - PROBLEM Dx:        PHYSICAL EXAM:  GENERAL: lethargic, in pain, mild distress.   HEAD:  Atraumatic, Normocephalic  EYES: EOMI, PERRLA, conjunctiva and sclera clear  NECK: Supple, No JVD  CHEST/LUNG: Clear to auscultation bilaterally; No wheeze  HEART: Irregular rate and rhythm; S1 S2  ABDOMEN: distended, moderate tenderness with guarding.   EXTREMITIES:  2+ Peripheral Pulses, No clubbing, cyanosis, or edema  PSYCH: AAOx1  NEUROLOGY: non-focal

## 2020-01-12 NOTE — CHART NOTE - NSCHARTNOTEFT_GEN_A_CORE
- was called by RN regarding FS of 24 this am.  - patient received 20u of Lantus and 7u of lispro as patient had FS of 300s overnight, FS dropped to 80s and this morning dropped to 24 and serum glucose in BMP was 3  - given 2 STAT doses of D10 250cc over 15min (as D 50 unavailable)   - repeat FS after boluses was in the range of 100s  - started  on D5 +1/2 NS @35cc/hr as maintenance as patient is NPO  - will monitor FS  - senior resident on call and Dr. Chopra aware - was called by RN regarding FS of 24 this am.  - patient received 20u of Lantus and 7u of lispro as patient had FS of 300s overnight, FS dropped to 80s and this morning dropped to 24 and serum glucose in BMP was 3  - Discontinued insulin basal /bolus   - given 2 STAT doses of D10 250cc over 15min (as D 50 unavailable)   - repeat FS after boluses was in the range of 100s  - started  on D5 +1/2 NS @35cc/hr as maintenance as patient is NPO  - will monitor FS  - senior resident on call and Dr. Chopra aware

## 2020-01-13 LAB
ANION GAP SERPL CALC-SCNC: 10 MMOL/L — SIGNIFICANT CHANGE UP (ref 7–14)
ANION GAP SERPL CALC-SCNC: 11 MMOL/L — SIGNIFICANT CHANGE UP (ref 7–14)
APTT BLD: 38.3 SEC — SIGNIFICANT CHANGE UP (ref 27–39.2)
BASOPHILS # BLD AUTO: 0.02 K/UL — SIGNIFICANT CHANGE UP (ref 0–0.2)
BASOPHILS # BLD AUTO: 0.02 K/UL — SIGNIFICANT CHANGE UP (ref 0–0.2)
BASOPHILS NFR BLD AUTO: 0.1 % — SIGNIFICANT CHANGE UP (ref 0–1)
BASOPHILS NFR BLD AUTO: 0.1 % — SIGNIFICANT CHANGE UP (ref 0–1)
BUN SERPL-MCNC: 42 MG/DL — HIGH (ref 10–20)
BUN SERPL-MCNC: 43 MG/DL — HIGH (ref 10–20)
CALCIUM SERPL-MCNC: 7 MG/DL — LOW (ref 8.5–10.1)
CALCIUM SERPL-MCNC: 7.2 MG/DL — LOW (ref 8.5–10.1)
CHLORIDE SERPL-SCNC: 105 MMOL/L — SIGNIFICANT CHANGE UP (ref 98–110)
CHLORIDE SERPL-SCNC: 105 MMOL/L — SIGNIFICANT CHANGE UP (ref 98–110)
CO2 SERPL-SCNC: 18 MMOL/L — SIGNIFICANT CHANGE UP (ref 17–32)
CO2 SERPL-SCNC: 19 MMOL/L — SIGNIFICANT CHANGE UP (ref 17–32)
CREAT SERPL-MCNC: 1.4 MG/DL — SIGNIFICANT CHANGE UP (ref 0.7–1.5)
CREAT SERPL-MCNC: 1.5 MG/DL — SIGNIFICANT CHANGE UP (ref 0.7–1.5)
EOSINOPHIL # BLD AUTO: 0.13 K/UL — SIGNIFICANT CHANGE UP (ref 0–0.7)
EOSINOPHIL # BLD AUTO: 0.17 K/UL — SIGNIFICANT CHANGE UP (ref 0–0.7)
EOSINOPHIL NFR BLD AUTO: 0.8 % — SIGNIFICANT CHANGE UP (ref 0–8)
EOSINOPHIL NFR BLD AUTO: 1.1 % — SIGNIFICANT CHANGE UP (ref 0–8)
GLUCOSE BLDC GLUCOMTR-MCNC: 113 MG/DL — HIGH (ref 70–99)
GLUCOSE BLDC GLUCOMTR-MCNC: 116 MG/DL — HIGH (ref 70–99)
GLUCOSE BLDC GLUCOMTR-MCNC: 40 MG/DL — CRITICAL LOW (ref 70–99)
GLUCOSE BLDC GLUCOMTR-MCNC: 43 MG/DL — CRITICAL LOW (ref 70–99)
GLUCOSE BLDC GLUCOMTR-MCNC: 53 MG/DL — LOW (ref 70–99)
GLUCOSE BLDC GLUCOMTR-MCNC: 56 MG/DL — LOW (ref 70–99)
GLUCOSE BLDC GLUCOMTR-MCNC: 75 MG/DL — SIGNIFICANT CHANGE UP (ref 70–99)
GLUCOSE BLDC GLUCOMTR-MCNC: 76 MG/DL — SIGNIFICANT CHANGE UP (ref 70–99)
GLUCOSE BLDC GLUCOMTR-MCNC: 78 MG/DL — SIGNIFICANT CHANGE UP (ref 70–99)
GLUCOSE BLDC GLUCOMTR-MCNC: 99 MG/DL — SIGNIFICANT CHANGE UP (ref 70–99)
GLUCOSE SERPL-MCNC: 115 MG/DL — HIGH (ref 70–99)
GLUCOSE SERPL-MCNC: 54 MG/DL — LOW (ref 70–99)
HCT VFR BLD CALC: 31.4 % — LOW (ref 37–47)
HCT VFR BLD CALC: 32.7 % — LOW (ref 37–47)
HGB BLD-MCNC: 10 G/DL — LOW (ref 12–16)
HGB BLD-MCNC: 9.9 G/DL — LOW (ref 12–16)
IMM GRANULOCYTES NFR BLD AUTO: 0.9 % — HIGH (ref 0.1–0.3)
IMM GRANULOCYTES NFR BLD AUTO: 1 % — HIGH (ref 0.1–0.3)
INR BLD: 1.34 RATIO — HIGH (ref 0.65–1.3)
LACTATE SERPL-SCNC: 1.7 MMOL/L — SIGNIFICANT CHANGE UP (ref 0.7–2)
LYMPHOCYTES # BLD AUTO: 1.58 K/UL — SIGNIFICANT CHANGE UP (ref 1.2–3.4)
LYMPHOCYTES # BLD AUTO: 1.78 K/UL — SIGNIFICANT CHANGE UP (ref 1.2–3.4)
LYMPHOCYTES # BLD AUTO: 10 % — LOW (ref 20.5–51.1)
LYMPHOCYTES # BLD AUTO: 11.6 % — LOW (ref 20.5–51.1)
MAGNESIUM SERPL-MCNC: 2 MG/DL — SIGNIFICANT CHANGE UP (ref 1.8–2.4)
MCHC RBC-ENTMCNC: 29.3 PG — SIGNIFICANT CHANGE UP (ref 27–31)
MCHC RBC-ENTMCNC: 30.1 PG — SIGNIFICANT CHANGE UP (ref 27–31)
MCHC RBC-ENTMCNC: 30.6 G/DL — LOW (ref 32–37)
MCHC RBC-ENTMCNC: 31.5 G/DL — LOW (ref 32–37)
MCV RBC AUTO: 95.4 FL — SIGNIFICANT CHANGE UP (ref 81–99)
MCV RBC AUTO: 95.9 FL — SIGNIFICANT CHANGE UP (ref 81–99)
MONOCYTES # BLD AUTO: 1.22 K/UL — HIGH (ref 0.1–0.6)
MONOCYTES # BLD AUTO: 1.25 K/UL — HIGH (ref 0.1–0.6)
MONOCYTES NFR BLD AUTO: 7.9 % — SIGNIFICANT CHANGE UP (ref 1.7–9.3)
MONOCYTES NFR BLD AUTO: 7.9 % — SIGNIFICANT CHANGE UP (ref 1.7–9.3)
NEUTROPHILS # BLD AUTO: 12.06 K/UL — HIGH (ref 1.4–6.5)
NEUTROPHILS # BLD AUTO: 12.64 K/UL — HIGH (ref 1.4–6.5)
NEUTROPHILS NFR BLD AUTO: 78.3 % — HIGH (ref 42.2–75.2)
NEUTROPHILS NFR BLD AUTO: 80.3 % — HIGH (ref 42.2–75.2)
NRBC # BLD: 0 /100 WBCS — SIGNIFICANT CHANGE UP (ref 0–0)
NRBC # BLD: 0 /100 WBCS — SIGNIFICANT CHANGE UP (ref 0–0)
PLATELET # BLD AUTO: 190 K/UL — SIGNIFICANT CHANGE UP (ref 130–400)
PLATELET # BLD AUTO: 204 K/UL — SIGNIFICANT CHANGE UP (ref 130–400)
POTASSIUM SERPL-MCNC: 3.7 MMOL/L — SIGNIFICANT CHANGE UP (ref 3.5–5)
POTASSIUM SERPL-MCNC: 3.9 MMOL/L — SIGNIFICANT CHANGE UP (ref 3.5–5)
POTASSIUM SERPL-SCNC: 3.7 MMOL/L — SIGNIFICANT CHANGE UP (ref 3.5–5)
POTASSIUM SERPL-SCNC: 3.9 MMOL/L — SIGNIFICANT CHANGE UP (ref 3.5–5)
PROTHROM AB SERPL-ACNC: 15.4 SEC — HIGH (ref 9.95–12.87)
RBC # BLD: 3.29 M/UL — LOW (ref 4.2–5.4)
RBC # BLD: 3.41 M/UL — LOW (ref 4.2–5.4)
RBC # FLD: 15.6 % — HIGH (ref 11.5–14.5)
RBC # FLD: 15.6 % — HIGH (ref 11.5–14.5)
SODIUM SERPL-SCNC: 134 MMOL/L — LOW (ref 135–146)
SODIUM SERPL-SCNC: 134 MMOL/L — LOW (ref 135–146)
WBC # BLD: 15.4 K/UL — HIGH (ref 4.8–10.8)
WBC # BLD: 15.76 K/UL — HIGH (ref 4.8–10.8)
WBC # FLD AUTO: 15.4 K/UL — HIGH (ref 4.8–10.8)
WBC # FLD AUTO: 15.76 K/UL — HIGH (ref 4.8–10.8)

## 2020-01-13 PROCEDURE — 99233 SBSQ HOSP IP/OBS HIGH 50: CPT

## 2020-01-13 PROCEDURE — 74018 RADEX ABDOMEN 1 VIEW: CPT | Mod: 26

## 2020-01-13 RX ORDER — MINERAL OIL
133 OIL (ML) MISCELLANEOUS ONCE
Refills: 0 | Status: COMPLETED | OUTPATIENT
Start: 2020-01-13 | End: 2020-01-13

## 2020-01-13 RX ORDER — DEXTROSE 10 % IN WATER 10 %
1000 INTRAVENOUS SOLUTION INTRAVENOUS
Refills: 0 | Status: DISCONTINUED | OUTPATIENT
Start: 2020-01-13 | End: 2020-01-15

## 2020-01-13 RX ORDER — DEXTROSE 10 % IN WATER 10 %
250 INTRAVENOUS SOLUTION INTRAVENOUS
Refills: 0 | Status: DISCONTINUED | OUTPATIENT
Start: 2020-01-13 | End: 2020-01-15

## 2020-01-13 RX ORDER — LACTULOSE 10 G/15ML
20 SOLUTION ORAL ONCE
Refills: 0 | Status: COMPLETED | OUTPATIENT
Start: 2020-01-13 | End: 2020-01-13

## 2020-01-13 RX ADMIN — Medication 500 MILLILITER(S): at 00:20

## 2020-01-13 RX ADMIN — HEPARIN SODIUM 5000 UNIT(S): 5000 INJECTION INTRAVENOUS; SUBCUTANEOUS at 05:36

## 2020-01-13 RX ADMIN — MEROPENEM 100 MILLIGRAM(S): 1 INJECTION INTRAVENOUS at 05:35

## 2020-01-13 RX ADMIN — MORPHINE SULFATE 4 MILLIGRAM(S): 50 CAPSULE, EXTENDED RELEASE ORAL at 17:02

## 2020-01-13 RX ADMIN — MORPHINE SULFATE 2 MILLIGRAM(S): 50 CAPSULE, EXTENDED RELEASE ORAL at 12:57

## 2020-01-13 RX ADMIN — MEROPENEM 100 MILLIGRAM(S): 1 INJECTION INTRAVENOUS at 16:57

## 2020-01-13 RX ADMIN — Medication 35 MILLILITER(S): at 11:17

## 2020-01-13 RX ADMIN — LACTULOSE 20 GRAM(S): 10 SOLUTION ORAL at 14:46

## 2020-01-13 RX ADMIN — MORPHINE SULFATE 2 MILLIGRAM(S): 50 CAPSULE, EXTENDED RELEASE ORAL at 06:49

## 2020-01-13 RX ADMIN — Medication 10 MILLIGRAM(S): at 22:00

## 2020-01-13 RX ADMIN — Medication 133 MILLILITER(S): at 14:46

## 2020-01-13 RX ADMIN — Medication 500 MILLILITER(S): at 04:25

## 2020-01-13 RX ADMIN — MORPHINE SULFATE 2 MILLIGRAM(S): 50 CAPSULE, EXTENDED RELEASE ORAL at 14:04

## 2020-01-13 RX ADMIN — HEPARIN SODIUM 5000 UNIT(S): 5000 INJECTION INTRAVENOUS; SUBCUTANEOUS at 16:57

## 2020-01-13 NOTE — PROGRESS NOTE ADULT - ASSESSMENT
A 89 year old female with PMH of HTN, DM type 2, Atrial fibrillation, PUD and HFrEF was brought to ED from Nursing home for lethargy, abdominal pain and change in her mental status, patient is poor historian and confused, as per NH patient started with abdominal pain for two days with nausea, constipation, no fever. She was treated recently with Macrobid for UTI.  In the ED: BP:136/70, HR:72 regular, RR:15, Temp: 100.6, KUB: large stool burden. CT Abdomen in ED: showed distended rectum with fecal impaction, urinary tract infection, mild colitis and diverticulitis. Lactate: 4.6 (trending up)on admission, creatinine elevated to 1.8 (baseline ~1.2),       A/P:   #  Sepsis POA/  due to colitis/ Lactic acidosis   - clinically improving in last 24 hours   - started on Meropenem and Vancomycin   - ID consult   - Urine and blood Cx are negative  - IV hydration, keep MAP above 65  - WBC WNL   - Lactic acidosis resolved   - treat constipation/ fecal impaction ( enemas mineral oil Q 4 hours)     # Prerenal OTTO / hypovolemic hyponatremia   - c/w IV hydration   - monitor BUN/Cr and urine output   - f/u repeat Na level   - avoid nephrotoxic medications     # Metabolic encephalopathy  -due to sepsis  - c/w Abx  - supportive care, prevent falls and aspiration   - check TSH, FT4, Vit B 12 and folate level, ammonia level     # Chronic combines CHF  - fluid restriction 1200 ml in 24 hours   - daily weight, intake and output monitoring   - hold diuretics   - monitor for fluid overload     # Hypothyroids   - c/w Synthroid   - check TFTs     # DM with episodes of hypoglycemia  - start D10 W 1/2 saline at 50 cc/h  - monitor finger sticks   - hold Insulin     # A-fib  - not on AC  - c/w ASA and Cardizem     # GI/DVT prophylaxis   #Progress Note Handoff  Pending (specify):  NPO, IV fluids , hold Insulin, IV Abx, start Enemas to treat constipation/ fecal impaction   Family discussion: n/a   Disposition: Home___/SNF___/Other________/Unknown at this time____x ____

## 2020-01-13 NOTE — PROGRESS NOTE ADULT - SUBJECTIVE AND OBJECTIVE BOX
JACKSON DAVENPORT 89y Female  MRN#: [id number]   CODE STATUS:________      SUBJECTIVE  Patient is a 89y old Female who presents with a chief complaint of Abdominal pain (12 Jan 2020 15:42)  Currently admitted to medicine with the primary diagnosis of Sepsis    Today is hospital day 3d, and this morning she was evaluated at bedside, patient slightly more lethargic than usual per nurse, althought patient is AAOX1 and responsive to questioning. Patient denies any acute abdominal pain when questioned but appears in distress on exam. Currently on IV antibiotics, wbc count trending down, however patient has had episodic hypoglycemia. Basal bolus d/c yesterday, currently on D10 at 50. In light of abdominal exam, will repeat KUB and keep patient NPO      OBJECTIVE  PAST MEDICAL & SURGICAL HISTORY  Afib  History of cholecystectomy  H/O abdominal hysterectomy  Gastric ulcer  Hypothyroid  DM (diabetes mellitus) type II controlled peripheral vascular disorder  Hyperlipidemia  Hypertension  H/O shoulder surgery: left side    ALLERGIES:  ciprofloxacin (Unknown)  Flagyl (Hives)    MEDICATIONS:  STANDING MEDICATIONS  aspirin enteric coated 81 milliGRAM(s) Oral daily  dextrose 10%. 250 milliLiter(s) IV Continuous <Continuous>  dextrose 10%. 250 milliLiter(s) IV Continuous <Continuous>  dextrose 10%. 250 milliLiter(s) IV Continuous <Continuous>  dextrose 10%. 250 milliLiter(s) IV Continuous <Continuous>  dextrose 10%. 1000 milliLiter(s) IV Continuous <Continuous>  dextrose 5% + sodium chloride 0.45%. 1000 milliLiter(s) IV Continuous <Continuous>  dextrose 5%. 1000 milliLiter(s) IV Continuous <Continuous>  dextrose 50% Injectable 12.5 Gram(s) IV Push once  dextrose 50% Injectable 25 Gram(s) IV Push once  dextrose 50% Injectable 25 Gram(s) IV Push once  diltiazem    Tablet 30 milliGRAM(s) Oral every 8 hours  furosemide    Tablet 20 milliGRAM(s) Oral daily  heparin  Injectable 5000 Unit(s) SubCutaneous every 12 hours  influenza   Vaccine 0.5 milliLiter(s) IntraMuscular once  levothyroxine 50 MICROGram(s) Oral daily  LORazepam   Injectable 0.25 milliGRAM(s) IV Push every 6 hours  meropenem  IVPB 1000 milliGRAM(s) IV Intermittent every 12 hours  pantoprazole    Tablet 40 milliGRAM(s) Oral before breakfast  vancomycin  IVPB 750 milliGRAM(s) IV Intermittent every 24 hours    PRN MEDICATIONS  dextrose 40% Gel 15 Gram(s) Oral once PRN  glucagon  Injectable 1 milliGRAM(s) IntraMuscular once PRN  morphine  - Injectable 2 milliGRAM(s) IV Push every 3 hours PRN  morphine  - Injectable 4 milliGRAM(s) IV Push every 6 hours PRN      VITAL SIGNS: Last 24 Hours  T(C): 36.1 (13 Jan 2020 05:05), Max: 36.2 (12 Jan 2020 21:26)  T(F): 97 (13 Jan 2020 05:05), Max: 97.1 (12 Jan 2020 21:26)  HR: 82 (13 Jan 2020 05:05) (82 - 89)  BP: 137/61 (13 Jan 2020 05:05) (114/62 - 137/61)  BP(mean): --  RR: 19 (13 Jan 2020 05:05) (19 - 20)  SpO2: 96% (12 Jan 2020 21:23) (96% - 96%)    LABS:                        9.9    15.76 )-----------( 190      ( 13 Jan 2020 09:35 )             31.4     01-13    134<L>  |  105  |  43<H>  ----------------------------<  115<H>  3.9   |  18  |  1.5    Ca    7.2<L>      13 Jan 2020 09:35  Phos  4.8     01-12  Mg     2.1     01-12      PT/INR - ( 13 Jan 2020 09:35 )   PT: 15.40 sec;   INR: 1.34 ratio         PTT - ( 13 Jan 2020 09:35 )  PTT:38.3 sec          Culture - Urine (collected 11 Jan 2020 02:10)  Source: .Urine Catheterized  Final Report (12 Jan 2020 15:12):    No growth          RADIOLOGY:      PHYSICAL EXAM:    GENERAL: NAD, frail, AAOx1  HEENT:  Atraumatic, Normocephalic. EOMI, PERRLA, conjunctiva and sclera clear  PULMONARY: Clear to auscultation bilaterally; No wheeze  CARDIOVASCULAR: Regular rate and rhythm; No murmurs, rubs, or gallops  GASTROINTESTINAL: + mild distension, + tender to palpation, No rigidity, BS +  MUSCULOSKELETAL:  2+ Peripheral Pulses, No clubbing, no edema  NEUROLOGY: awake, responsive to questions, able to move UE spontaneously. weaker LE movements (baseline)

## 2020-01-13 NOTE — PROGRESS NOTE ADULT - SUBJECTIVE AND OBJECTIVE BOX
89y old  Female who presents with a chief complaint of Abdominal pain due to constipation and fecal impaction with suspected stercoral colitis. Pt developed worsening leukocytosis and abdominal distention. KUB was negative for perforation, WBC count is trending down.  Today pt is lethargic, comfortable at rest, grimacing during abdominal palpation.     Vital Signs Last 24 Hrs  T(C): 35.7 (13 Jan 2020 13:31), Max: 36.2 (12 Jan 2020 21:26)  T(F): 96.3 (13 Jan 2020 13:31), Max: 97.1 (12 Jan 2020 21:26)  HR: 83 (13 Jan 2020 13:31) (82 - 87)  BP: 137/67 (13 Jan 2020 13:31) (114/62 - 137/67)  BP(mean): --  RR: 16 (13 Jan 2020 13:31) (16 - 20)  SpO2: 98% (13 Jan 2020 13:31) (96% - 99%)      PHYSICAL EXAM:  GENERAL: lethargic, NAD   HEAD:  Atraumatic, Normocephalic  NECK: Supple, No JVD  CHEST/LUNG: decreased BS at bases   HEART: Irregular rate and rhythm; S1 S2  ABDOMEN: distended, diffuse tenderness noted on deep palpation, no rebound, no guarding, BS present   EXTREMITIES:  2+ Peripheral Pulses, No clubbing, cyanosis, or edema  PSYCH: arousable, does not follow command   NEUROLOGY: non-focal    LABS                          10.0   15.40 )-----------( 204      ( 13 Jan 2020 11:36 )             32.7     01-13    134<L>  |  105  |  42<H>  ----------------------------<  54<L>  3.7   |  19  |  1.4    Ca    7.0<L>      13 Jan 2020 11:36  Phos  4.8     01-12  Mg     2.0     01-13    Lactate Trend  01-12 @ 06:09 Lactate:1.7   01-11 @ 18:07 Lactate:3.6   01-11 @ 07:25 Lactate:1.7   01-10 @ 12:06 Lactate:8.5   Culture - Urine (01.11.20 @ 02:10)    Specimen Source: .Urine Catheterized    Culture Results:   No growth    Culture - Blood (01.10.20 @ 00:50)    Specimen Source: .Blood Blood    Culture Results:   No growth to date.    Culture - Urine (collected 01-11-20 @ 02:10)  Source: .Urine Catheterized  Final Report (01-12-20 @ 15:12):    No growth    RADIOLOGY & ADDITIONAL TESTS:  < from: Xray Kidney Ureter Bladder (01.13.20 @ 09:47) >  impression:    Since 1/10/2020, unchanged large stool within the rectum. Nonobstructive bowel gas pattern. Evaluation for free intraperitoneal air is limited on a single supine image. Osseous structures are stable.  < from: Xray Abdomen 1 View PORTABLE -Urgent (01.11.20 @ 14:50) >  impression:    Large amount stool within the rectum. Prominent distended loops of small bowel in the left mid abdomen are noted. Otherwise, nonobstructive bowel gas pattern.    Calcified fibroid in the left pelvis. Right upper quadrant surgical clips    < end of copied text >    < from: CT Abdomen and Pelvis w/ IV Cont (01.10.20 @ 01:54) >    IMPRESSION:    Definitive acute inflammatory abnormality is not identified.    Stool-filled distended rectum to 10.5 cm which can be seen in fecal impaction.    Left renal fullness and proximal hydroureter with questionable mild urothelial enhancement of the left renal collecting system. Consider correlation with urinalysis to exclude urinary infection.    Underdistention versus focal wall thickening at the descending/proximal sigmoid colon junction without discretely inflamed diverticulum. In the appropriate clinical setting, findings mayreflect a mild, uncomplicated colitis or diverticulitis.    < from: TTE Echo Doppler w/o Cont (10.09.19 @ 11:07) >  Summary:   1. Left ventricular ejection fraction, by visual estimation, is 35 to   40%.   2. Severely decreased global left ventricular systolic function.   3. Basal and mid anterior wall, basal and mid inferior septum, and basal   inferolateral segment are abnormal as described above.   4. Normal left ventricular internal cavity size.   5. Spectral Doppler shows restrictive pattern of left ventricular   myocardial filling (Grade III diastolic dysfunction).   6. Normal right ventricular size and function.   7. The left atrium is normal in size.   8. The right atrium is normal in size.   9. Small pericardial effusion.  10. Mild mitral annular calcification.  11. Mild mitral valve regurgitation.  12. Thickening of the anterior and posterior mitral valve leaflets.  13. Structurally normal tricuspid valve, with normal leaflet excursion.  14. Mild aortic regurgitation.  15. Structurally normal pulmonic valve, with normal leaflet excursion.    < end of copied text >      MEDICATIONS  (STANDING):  aspirin enteric coated 81 milliGRAM(s) Oral daily  dextrose 10%. 250 milliLiter(s) (500 mL/Hr) IV Continuous <Continuous>  dextrose 10%. 250 milliLiter(s) (500 mL/Hr) IV Continuous <Continuous>  dextrose 10%. 250 milliLiter(s) (500 mL/Hr) IV Continuous <Continuous>  dextrose 10%. 250 milliLiter(s) (500 mL/Hr) IV Continuous <Continuous>  dextrose 10%. 1000 milliLiter(s) (35 mL/Hr) IV Continuous <Continuous>  dextrose 5% + sodium chloride 0.45%. 1000 milliLiter(s) (75 mL/Hr) IV Continuous <Continuous>  dextrose 5%. 1000 milliLiter(s) (50 mL/Hr) IV Continuous <Continuous>  dextrose 50% Injectable 12.5 Gram(s) IV Push once  dextrose 50% Injectable 25 Gram(s) IV Push once  dextrose 50% Injectable 25 Gram(s) IV Push once  diltiazem    Tablet 30 milliGRAM(s) Oral every 8 hours  furosemide    Tablet 20 milliGRAM(s) Oral daily  heparin  Injectable 5000 Unit(s) SubCutaneous every 12 hours  influenza   Vaccine 0.5 milliLiter(s) IntraMuscular once  levothyroxine 50 MICROGram(s) Oral daily  LORazepam   Injectable 0.25 milliGRAM(s) IV Push every 6 hours  meropenem  IVPB 1000 milliGRAM(s) IV Intermittent every 12 hours  pantoprazole    Tablet 40 milliGRAM(s) Oral before breakfast  vancomycin  IVPB 750 milliGRAM(s) IV Intermittent every 24 hours    MEDICATIONS  (PRN):  dextrose 40% Gel 15 Gram(s) Oral once PRN Blood Glucose LESS THAN 70 milliGRAM(s)/deciliter  glucagon  Injectable 1 milliGRAM(s) IntraMuscular once PRN Glucose LESS THAN 70 milligrams/deciliter  morphine  - Injectable 4 milliGRAM(s) IV Push every 6 hours PRN Severe Pain (7 - 10)  morphine  - Injectable 2 milliGRAM(s) IV Push every 3 hours PRN Moderate Pain (4 - 6)

## 2020-01-13 NOTE — PROGRESS NOTE ADULT - ASSESSMENT
89 yo with PMH of hypertension, gastric ulcer, hypothyroidism, HLD, CHF, CKD, DMII, chronic systolic and diastolic heart failure, Afib with RVR presented from [facility] [place] with complaint of acute abdominal/suprapubic pain onset two days ago associated with nausea.    Severe Sepsis possible Complicated UTI and or Colitis  - persistently elevated lactate, will repeat Lactate today  - WBC trending down  - patient symptomatically worsened since presentation, + abdominal distension  - f/u repeat KUB to r/o perforation  - CT Abdomen: + fecal impaction with large stool burden, Urinary Tract Infection  - CXR: unremarkable  - continue with IV meropenem and vancomycin  - vancomycin trough today prior to next dose  Patient DNR/DNI, and family more towards comfort measures while continuing medical treatment, will discuss current prognosis with family comfort measures only.    Hypoglycemia  - patient with low PO intake, basal bolus coverage d/c'd  - episodes of hypoglycemia overnight FS (39-56)  - Started on D10 at 50  - f/s q4, will continue to monitor    OTTO on CKD 4 (resolving)  - baseline creatinine around 1.1, creatinine trending down, 1.5 today  - continue light  hydration    Atrial Fibrillation w RVR not on anticoagulation  - continue diltiazem   - will hold digoxin in light of OTTO    Chronic systolic and diastolic HF  - no cardiopulmonary disease  - c/w furosemide 20 mg PO daily  - strict Is Os  - daily weights  - monitor for volume overload    HTN  - controlled, continue current meds    Code status: DNR/DNI  Diet: carb consistent renal  dispo: acute, from [facility], pending family discussion about comfort measures.

## 2020-01-14 LAB
GLUCOSE BLDC GLUCOMTR-MCNC: 114 MG/DL — HIGH (ref 70–99)
GLUCOSE BLDC GLUCOMTR-MCNC: 119 MG/DL — HIGH (ref 70–99)
GLUCOSE BLDC GLUCOMTR-MCNC: 143 MG/DL — HIGH (ref 70–99)
GLUCOSE BLDC GLUCOMTR-MCNC: 144 MG/DL — HIGH (ref 70–99)
GLUCOSE BLDC GLUCOMTR-MCNC: 215 MG/DL — HIGH (ref 70–99)
GLUCOSE BLDC GLUCOMTR-MCNC: 228 MG/DL — HIGH (ref 70–99)
GLUCOSE BLDC GLUCOMTR-MCNC: 69 MG/DL — LOW (ref 70–99)
VANCOMYCIN TROUGH SERPL-MCNC: 16.3 UG/ML — HIGH (ref 5–10)
VANCOMYCIN TROUGH SERPL-MCNC: 9.9 UG/ML — SIGNIFICANT CHANGE UP (ref 5–10)

## 2020-01-14 PROCEDURE — 99233 SBSQ HOSP IP/OBS HIGH 50: CPT

## 2020-01-14 RX ORDER — DEXTROSE 10 % IN WATER 10 %
250 INTRAVENOUS SOLUTION INTRAVENOUS
Refills: 0 | Status: DISCONTINUED | OUTPATIENT
Start: 2020-01-14 | End: 2020-01-15

## 2020-01-14 RX ORDER — MINERAL OIL
133 OIL (ML) MISCELLANEOUS
Refills: 0 | Status: DISCONTINUED | OUTPATIENT
Start: 2020-01-14 | End: 2020-01-15

## 2020-01-14 RX ADMIN — MORPHINE SULFATE 2 MILLIGRAM(S): 50 CAPSULE, EXTENDED RELEASE ORAL at 01:42

## 2020-01-14 RX ADMIN — HEPARIN SODIUM 5000 UNIT(S): 5000 INJECTION INTRAVENOUS; SUBCUTANEOUS at 06:09

## 2020-01-14 RX ADMIN — Medication 250 MILLIGRAM(S): at 02:34

## 2020-01-14 RX ADMIN — HEPARIN SODIUM 5000 UNIT(S): 5000 INJECTION INTRAVENOUS; SUBCUTANEOUS at 17:28

## 2020-01-14 RX ADMIN — MORPHINE SULFATE 4 MILLIGRAM(S): 50 CAPSULE, EXTENDED RELEASE ORAL at 15:26

## 2020-01-14 RX ADMIN — MORPHINE SULFATE 4 MILLIGRAM(S): 50 CAPSULE, EXTENDED RELEASE ORAL at 15:31

## 2020-01-14 RX ADMIN — Medication 0.25 MILLIGRAM(S): at 23:27

## 2020-01-14 RX ADMIN — MORPHINE SULFATE 2 MILLIGRAM(S): 50 CAPSULE, EXTENDED RELEASE ORAL at 20:06

## 2020-01-14 RX ADMIN — Medication 133 MILLILITER(S): at 15:08

## 2020-01-14 RX ADMIN — MEROPENEM 100 MILLIGRAM(S): 1 INJECTION INTRAVENOUS at 06:09

## 2020-01-14 RX ADMIN — Medication 500 MILLILITER(S): at 00:28

## 2020-01-14 NOTE — PROGRESS NOTE ADULT - SUBJECTIVE AND OBJECTIVE BOX
89y old  Female who presents with a chief complaint of Abdominal pain due to constipation and fecal impaction with suspected stercoral colitis. Pt developed worsening leukocytosis and abdominal distention. KUB was negative for perforation, WBC count is trending down.  Today pt is more awake, answering questions.     Vital Signs Last 24 Hrs  T(C): 36 (14 Jan 2020 05:18), Max: 36 (14 Jan 2020 05:18)  T(F): 96.8 (14 Jan 2020 05:18), Max: 96.8 (14 Jan 2020 05:18)  HR: 74 (14 Jan 2020 05:18) (74 - 94)  BP: 127/71 (14 Jan 2020 05:18) (127/71 - 147/85)  RR: 18 (14 Jan 2020 05:18) (16 - 18)  SpO2: 100% (14 Jan 2020 05:18) (96% - 100%)      PHYSICAL EXAM:  GENERAL: more awake today, NAD   HEAD:  Atraumatic, Normocephalic  NECK: Supple, No JVD  CHEST/LUNG: decreased BS at bases   HEART: Irregular rate and rhythm; S1 S2  ABDOMEN: distended, diffuse tenderness noted on deep palpation, no rebound, no guarding, BS present   EXTREMITIES:  2+ Peripheral Pulses, No clubbing, cyanosis, or edema  PSYCH: arousable, does not follow command   NEUROLOGY: non-focal    LABS                                     10.0   15.40 )-----------( 204      ( 13 Jan 2020 11:36 )             32.7   01-13    134<L>  |  105  |  42<H>  ----------------------------<  54<L>  3.7   |  19  |  1.4    Ca    7.0<L>      13 Jan 2020 11:36  Mg     2.0     01-13        Lactate Trend  01-12 @ 06:09 Lactate:1.7   01-11 @ 18:07 Lactate:3.6   01-11 @ 07:25 Lactate:1.7   01-10 @ 12:06 Lactate:8.5   Culture - Urine (01.11.20 @ 02:10)    Specimen Source: .Urine Catheterized    Culture Results:   No growth    Culture - Blood (01.10.20 @ 00:50)    Specimen Source: .Blood Blood    Culture Results:   No growth to date.    Culture - Urine (collected 01-11-20 @ 02:10)  Source: .Urine Catheterized  Final Report (01-12-20 @ 15:12):    No growth    RADIOLOGY & ADDITIONAL TESTS:  < from: Xray Kidney Ureter Bladder (01.13.20 @ 09:47) >  impression:    Since 1/10/2020, unchanged large stool within the rectum. Nonobstructive bowel gas pattern. Evaluation for free intraperitoneal air is limited on a single supine image. Osseous structures are stable.  < from: Xray Abdomen 1 View PORTABLE -Urgent (01.11.20 @ 14:50) >  impression:    Large amount stool within the rectum. Prominent distended loops of small bowel in the left mid abdomen are noted. Otherwise, nonobstructive bowel gas pattern.    Calcified fibroid in the left pelvis. Right upper quadrant surgical clips    < end of copied text >    < from: CT Abdomen and Pelvis w/ IV Cont (01.10.20 @ 01:54) >    IMPRESSION:    Definitive acute inflammatory abnormality is not identified.    Stool-filled distended rectum to 10.5 cm which can be seen in fecal impaction.    Left renal fullness and proximal hydroureter with questionable mild urothelial enhancement of the left renal collecting system. Consider correlation with urinalysis to exclude urinary infection.    Underdistention versus focal wall thickening at the descending/proximal sigmoid colon junction without discretely inflamed diverticulum. In the appropriate clinical setting, findings mayreflect a mild, uncomplicated colitis or diverticulitis.    < from: TTE Echo Doppler w/o Cont (10.09.19 @ 11:07) >  Summary:   1. Left ventricular ejection fraction, by visual estimation, is 35 to   40%.   2. Severely decreased global left ventricular systolic function.   3. Basal and mid anterior wall, basal and mid inferior septum, and basal   inferolateral segment are abnormal as described above.   4. Normal left ventricular internal cavity size.   5. Spectral Doppler shows restrictive pattern of left ventricular   myocardial filling (Grade III diastolic dysfunction).   6. Normal right ventricular size and function.   7. The left atrium is normal in size.   8. The right atrium is normal in size.   9. Small pericardial effusion.  10. Mild mitral annular calcification.  11. Mild mitral valve regurgitation.  12. Thickening of the anterior and posterior mitral valve leaflets.  13. Structurally normal tricuspid valve, with normal leaflet excursion.  14. Mild aortic regurgitation.  15. Structurally normal pulmonic valve, with normal leaflet excursion.    < end of copied text >  < from: Xray Kidney Ureter Bladder (01.13.20 @ 09:47) >  impression:    Since 1/10/2020, unchanged large stool within the rectum. Nonobstructive bowel gas pattern. Evaluation for free intraperitoneal air is limited on a single supine image. Osseous structures are stable.    < end of copied text >      MEDICATIONS  (STANDING):  aspirin enteric coated 81 milliGRAM(s) Oral daily  dextrose 10%. 250 milliLiter(s) (500 mL/Hr) IV Continuous <Continuous>  dextrose 10%. 250 milliLiter(s) (500 mL/Hr) IV Continuous <Continuous>  dextrose 10%. 250 milliLiter(s) (500 mL/Hr) IV Continuous <Continuous>  dextrose 10%. 250 milliLiter(s) (500 mL/Hr) IV Continuous <Continuous>  dextrose 10%. 1000 milliLiter(s) (35 mL/Hr) IV Continuous <Continuous>  dextrose 10%. 250 milliLiter(s) (500 mL/Hr) IV Continuous <Continuous>  dextrose 10%. 250 milliLiter(s) (500 mL/Hr) IV Continuous <Continuous>  dextrose 5% + sodium chloride 0.45%. 1000 milliLiter(s) (75 mL/Hr) IV Continuous <Continuous>  dextrose 5%. 1000 milliLiter(s) (50 mL/Hr) IV Continuous <Continuous>  dextrose 50% Injectable 12.5 Gram(s) IV Push once  dextrose 50% Injectable 25 Gram(s) IV Push once  dextrose 50% Injectable 25 Gram(s) IV Push once  diltiazem    Tablet 30 milliGRAM(s) Oral every 8 hours  heparin  Injectable 5000 Unit(s) SubCutaneous every 12 hours  influenza   Vaccine 0.5 milliLiter(s) IntraMuscular once  levothyroxine 50 MICROGram(s) Oral daily  LORazepam   Injectable 0.25 milliGRAM(s) IV Push every 6 hours  mineral oil enema 133 milliLiter(s) Rectal <User Schedule>  pantoprazole    Tablet 40 milliGRAM(s) Oral before breakfast    MEDICATIONS  (PRN):  dextrose 40% Gel 15 Gram(s) Oral once PRN Blood Glucose LESS THAN 70 milliGRAM(s)/deciliter  glucagon  Injectable 1 milliGRAM(s) IntraMuscular once PRN Glucose LESS THAN 70 milligrams/deciliter  morphine  - Injectable 4 milliGRAM(s) IV Push every 6 hours PRN Severe Pain (7 - 10)  morphine  - Injectable 2 milliGRAM(s) IV Push every 3 hours PRN Moderate Pain (4 - 6)

## 2020-01-14 NOTE — PROGRESS NOTE ADULT - SUBJECTIVE AND OBJECTIVE BOX
JACKSON DAVENPORT 89y Female  MRN#: [id number]   CODE STATUS:________      SUBJECTIVE  Patient is a 89y old Female who presents with a chief complaint of Abdominal pain (13 Jan 2020 15:24)  Currently admitted to medicine with the primary diagnosis of Sepsis    Today is hospital day 4d, and this morning she is resting in bed, no acute events reported overnight. Patient's leukocytosis is improving, had one small BM yesterday, will attempt manual disimpaction today. Patient will be oob/chair. Antibiotics will be d/c. She is hemodynamically stable, and AAOx2 (baseline)  today and denies any acute abdominal pain, although abdomen appears full.     OBJECTIVE  PAST MEDICAL & SURGICAL HISTORY  Afib  History of cholecystectomy  H/O abdominal hysterectomy  Gastric ulcer  Hypothyroid  DM (diabetes mellitus) type II controlled peripheral vascular disorder  Hyperlipidemia  Hypertension  H/O shoulder surgery: left side    ALLERGIES:  ciprofloxacin (Unknown)  Flagyl (Hives)    MEDICATIONS:  STANDING MEDICATIONS  aspirin enteric coated 81 milliGRAM(s) Oral daily  dextrose 10%. 250 milliLiter(s) IV Continuous <Continuous>  dextrose 10%. 250 milliLiter(s) IV Continuous <Continuous>  dextrose 10%. 250 milliLiter(s) IV Continuous <Continuous>  dextrose 10%. 250 milliLiter(s) IV Continuous <Continuous>  dextrose 10%. 1000 milliLiter(s) IV Continuous <Continuous>  dextrose 10%. 250 milliLiter(s) IV Continuous <Continuous>  dextrose 10%. 250 milliLiter(s) IV Continuous <Continuous>  dextrose 5% + sodium chloride 0.45%. 1000 milliLiter(s) IV Continuous <Continuous>  dextrose 5%. 1000 milliLiter(s) IV Continuous <Continuous>  dextrose 50% Injectable 12.5 Gram(s) IV Push once  dextrose 50% Injectable 25 Gram(s) IV Push once  dextrose 50% Injectable 25 Gram(s) IV Push once  diltiazem    Tablet 30 milliGRAM(s) Oral every 8 hours  heparin  Injectable 5000 Unit(s) SubCutaneous every 12 hours  influenza   Vaccine 0.5 milliLiter(s) IntraMuscular once  levothyroxine 50 MICROGram(s) Oral daily  LORazepam   Injectable 0.25 milliGRAM(s) IV Push every 6 hours  mineral oil enema 133 milliLiter(s) Rectal <User Schedule>  pantoprazole    Tablet 40 milliGRAM(s) Oral before breakfast    PRN MEDICATIONS  dextrose 40% Gel 15 Gram(s) Oral once PRN  glucagon  Injectable 1 milliGRAM(s) IntraMuscular once PRN  morphine  - Injectable 4 milliGRAM(s) IV Push every 6 hours PRN  morphine  - Injectable 2 milliGRAM(s) IV Push every 3 hours PRN      VITAL SIGNS: Last 24 Hours  T(C): 36 (14 Jan 2020 05:18), Max: 36 (14 Jan 2020 05:18)  T(F): 96.8 (14 Jan 2020 05:18), Max: 96.8 (14 Jan 2020 05:18)  HR: 74 (14 Jan 2020 05:18) (74 - 94)  BP: 127/71 (14 Jan 2020 05:18) (127/71 - 147/85)  BP(mean): --  RR: 18 (14 Jan 2020 05:18) (16 - 18)  SpO2: 100% (14 Jan 2020 05:18) (96% - 100%)    LABS:                        10.0   15.40 )-----------( 204      ( 13 Jan 2020 11:36 )             32.7     01-13    134<L>  |  105  |  42<H>  ----------------------------<  54<L>  3.7   |  19  |  1.4    Ca    7.0<L>      13 Jan 2020 11:36  Mg     2.0     01-13      PT/INR - ( 13 Jan 2020 09:35 )   PT: 15.40 sec;   INR: 1.34 ratio         PTT - ( 13 Jan 2020 09:35 )  PTT:38.3 sec      Lactate, Blood: 1.7 mmol/L (01-13-20 @ 12:00)          RADIOLOGY:      PHYSICAL EXAM:    GENERAL: NAD, frail, AAOx2  HEENT:  Atraumatic, Normocephalic. EOMI, PERRLA, conjunctiva and sclera clear,   PULMONARY: Clear to auscultation bilaterally; no crackles no wheezing  CARDIOVASCULAR: Regular rate and rhythm; No murmurs, rubs, or gallops  GASTROINTESTINAL: mild distension, tender to deep palpation, BS +, no ridgidity  MUSCULOSKELETAL:  2+ Peripheral Pulses, UE edema noted  NEUROLOGY: awake, responsive to questions, able to move extremities spontaneous

## 2020-01-14 NOTE — PROGRESS NOTE ADULT - ASSESSMENT
89 yo with PMH of hypertension, gastric ulcer, hypothyroidism, HLD, CHF, CKD, DMII, chronic systolic and diastolic heart failure, Afib with RVR presented from [facility] with complaint of acute abdominal/suprapubic pain onset two days ago associated with nausea.    Altered Mental Status secondary to Severe sepsis on admission due to UTI  - currently hemodynamically stable, lactate wnl  - WBC trending down, patient refused labs this am, will repeat in afternoon  - CT Abdomen: + fecal impaction with large stool burden, Urinary Tract Infection  - CXR: unremarkable  - s/p Vancomycin and meropenem, bcx and ucx negative to date    Abdominal pain secondary to large stool impaction  - leukocytosis might be secondary to stool stress  - Repeat KUB with large rectal stool impaction, no perf  - s/p mineral enema with small bowel movement  - manual disimpaction today    Hypoglycemia  - patient with no PO intake, unable to swallow  - NPO for now  - SLP evaluation today  - c/w D10 at 50  - f/s q4, will continue to monitor    OTTO on CKD 4 (resolving)  - baseline creatinine around 1.1, creatinine trending down  - continue light  hydration    Atrial Fibrillation w RVR not on anticoagulation  - continue diltiazem   - will hold digoxin in light of OTTO    Chronic systolic and diastolic HF  - no cardiopulmonary disease  - c/w furosemide 20 mg PO daily  - strict Is Os  - daily weights  - monitor for volume overload    HTN  - controlled, continue current meds    Code status: DNR/DNI  Diet: carb consistent renal  dispo: acute, from [facility],

## 2020-01-14 NOTE — SWALLOW BEDSIDE ASSESSMENT ADULT - SWALLOW EVAL: DIAGNOSIS
unable to participate in assessment secondary to poor mental status not accepting po by mouth despite encouragement

## 2020-01-14 NOTE — CDI QUERY NOTE - NSCDIOTHERTXTBX_GEN_ALL_CORE_HH
Validation of DKA:  ===========================================  BIBA from NH c/o abdominal pain /bloating  on macrobid for UTI  also AMS    In ED:  Rectal T 100.6, SPO@ 100% on 2lnc O2, WBC 24.25,  VBG lactate 4.6,  7.41/36/23  ,   Serum Glucose 482  AG 24, Urine Ketones - neg. , Acetone not drawn.    Treatment:  Insulin - Sliding Scale subcutaneous    Attending Progress note of January 10 states:  High anion gap metabolic acidosis:   from lactic acidosis and DKA, hyperglycemia  Continue IV fluid, Lantus."    Please validate the above diagnosis with all supporting clinical evidence, or ,   If the diagnosis of DKA has been Ruled Out, please document so within the progress note.    Thank you for your cooperation in this matter.

## 2020-01-15 LAB
ANION GAP SERPL CALC-SCNC: 12 MMOL/L — SIGNIFICANT CHANGE UP (ref 7–14)
BASOPHILS # BLD AUTO: 0.02 K/UL — SIGNIFICANT CHANGE UP (ref 0–0.2)
BASOPHILS NFR BLD AUTO: 0.1 % — SIGNIFICANT CHANGE UP (ref 0–1)
BUN SERPL-MCNC: 32 MG/DL — HIGH (ref 10–20)
CALCIUM SERPL-MCNC: 7.9 MG/DL — LOW (ref 8.5–10.1)
CHLORIDE SERPL-SCNC: 102 MMOL/L — SIGNIFICANT CHANGE UP (ref 98–110)
CO2 SERPL-SCNC: 19 MMOL/L — SIGNIFICANT CHANGE UP (ref 17–32)
CREAT SERPL-MCNC: 1 MG/DL — SIGNIFICANT CHANGE UP (ref 0.7–1.5)
CULTURE RESULTS: SIGNIFICANT CHANGE UP
CULTURE RESULTS: SIGNIFICANT CHANGE UP
EOSINOPHIL # BLD AUTO: 0.07 K/UL — SIGNIFICANT CHANGE UP (ref 0–0.7)
EOSINOPHIL NFR BLD AUTO: 0.5 % — SIGNIFICANT CHANGE UP (ref 0–8)
GLUCOSE BLDC GLUCOMTR-MCNC: 174 MG/DL — HIGH (ref 70–99)
GLUCOSE BLDC GLUCOMTR-MCNC: 179 MG/DL — HIGH (ref 70–99)
GLUCOSE BLDC GLUCOMTR-MCNC: 185 MG/DL — HIGH (ref 70–99)
GLUCOSE BLDC GLUCOMTR-MCNC: 191 MG/DL — HIGH (ref 70–99)
GLUCOSE BLDC GLUCOMTR-MCNC: 213 MG/DL — HIGH (ref 70–99)
GLUCOSE BLDC GLUCOMTR-MCNC: 216 MG/DL — HIGH (ref 70–99)
GLUCOSE SERPL-MCNC: 195 MG/DL — HIGH (ref 70–99)
HCT VFR BLD CALC: 31.6 % — LOW (ref 37–47)
HGB BLD-MCNC: 10.2 G/DL — LOW (ref 12–16)
IMM GRANULOCYTES NFR BLD AUTO: 0.9 % — HIGH (ref 0.1–0.3)
LYMPHOCYTES # BLD AUTO: 1.34 K/UL — SIGNIFICANT CHANGE UP (ref 1.2–3.4)
LYMPHOCYTES # BLD AUTO: 8.8 % — LOW (ref 20.5–51.1)
MAGNESIUM SERPL-MCNC: 2 MG/DL — SIGNIFICANT CHANGE UP (ref 1.8–2.4)
MCHC RBC-ENTMCNC: 29.6 PG — SIGNIFICANT CHANGE UP (ref 27–31)
MCHC RBC-ENTMCNC: 32.3 G/DL — SIGNIFICANT CHANGE UP (ref 32–37)
MCV RBC AUTO: 91.6 FL — SIGNIFICANT CHANGE UP (ref 81–99)
MONOCYTES # BLD AUTO: 1.41 K/UL — HIGH (ref 0.1–0.6)
MONOCYTES NFR BLD AUTO: 9.2 % — SIGNIFICANT CHANGE UP (ref 1.7–9.3)
NEUTROPHILS # BLD AUTO: 12.31 K/UL — HIGH (ref 1.4–6.5)
NEUTROPHILS NFR BLD AUTO: 80.5 % — HIGH (ref 42.2–75.2)
NRBC # BLD: 0 /100 WBCS — SIGNIFICANT CHANGE UP (ref 0–0)
PLATELET # BLD AUTO: 215 K/UL — SIGNIFICANT CHANGE UP (ref 130–400)
POTASSIUM SERPL-MCNC: 4.5 MMOL/L — SIGNIFICANT CHANGE UP (ref 3.5–5)
POTASSIUM SERPL-SCNC: 4.5 MMOL/L — SIGNIFICANT CHANGE UP (ref 3.5–5)
RBC # BLD: 3.45 M/UL — LOW (ref 4.2–5.4)
RBC # FLD: 15.7 % — HIGH (ref 11.5–14.5)
SODIUM SERPL-SCNC: 133 MMOL/L — LOW (ref 135–146)
SPECIMEN SOURCE: SIGNIFICANT CHANGE UP
SPECIMEN SOURCE: SIGNIFICANT CHANGE UP
WBC # BLD: 15.28 K/UL — HIGH (ref 4.8–10.8)
WBC # FLD AUTO: 15.28 K/UL — HIGH (ref 4.8–10.8)

## 2020-01-15 PROCEDURE — 99233 SBSQ HOSP IP/OBS HIGH 50: CPT

## 2020-01-15 RX ORDER — HALOPERIDOL DECANOATE 100 MG/ML
2 INJECTION INTRAMUSCULAR ONCE
Refills: 0 | Status: COMPLETED | OUTPATIENT
Start: 2020-01-15 | End: 2020-01-15

## 2020-01-15 RX ORDER — DIGOXIN 250 MCG
0.12 TABLET ORAL DAILY
Refills: 0 | Status: DISCONTINUED | OUTPATIENT
Start: 2020-01-15 | End: 2020-01-17

## 2020-01-15 RX ADMIN — MORPHINE SULFATE 2 MILLIGRAM(S): 50 CAPSULE, EXTENDED RELEASE ORAL at 09:50

## 2020-01-15 RX ADMIN — HALOPERIDOL DECANOATE 2 MILLIGRAM(S): 100 INJECTION INTRAMUSCULAR at 23:08

## 2020-01-15 RX ADMIN — HEPARIN SODIUM 5000 UNIT(S): 5000 INJECTION INTRAVENOUS; SUBCUTANEOUS at 05:13

## 2020-01-15 RX ADMIN — HEPARIN SODIUM 5000 UNIT(S): 5000 INJECTION INTRAVENOUS; SUBCUTANEOUS at 18:16

## 2020-01-15 RX ADMIN — MORPHINE SULFATE 2 MILLIGRAM(S): 50 CAPSULE, EXTENDED RELEASE ORAL at 10:24

## 2020-01-15 NOTE — DIETITIAN INITIAL EVALUATION ADULT. - ENTERAL
If family chooses to consider EN, obtain access via NG v OGT and initiate the following regimen as medically feasible. Glucerna 1.2 total volume 720 mL/day. Bolus feeds of 240 mL q8hrs to provide 864 kcal, 43 g protein and 583 mL water. Add no carb prosouce TF 3x/day for an additional 120 kcal and 33 g pro.

## 2020-01-15 NOTE — SWALLOW BEDSIDE ASSESSMENT ADULT - SWALLOW EVAL: RECOMMENDED DIET
NPO with alternate means nutrition hydration secondary to altered mental status and inconsistent arousability

## 2020-01-15 NOTE — PROGRESS NOTE ADULT - ATTENDING COMMENTS
#Chronic anemia - stable, monitor   #Encephalopathy due to UTI and polypharmacy - improving, hold morphine and benzodiazepines, SLP f/u tomorrow   #Constipation - had BM today     #Progress Note Handoff  Pending (specify):  resolution of encephalopathy, SLP f/u tomorrow   Family discussion: dw daughter at bedside plan for SLP eval and monitoring intake   Disposition: STR

## 2020-01-15 NOTE — DIETITIAN INITIAL EVALUATION ADULT. - OTHER INFO
Pt admitted d/t sepsis, constipation, and elevated troponin. Pt is sp manual disimpaction yesterday (1/14) with 2 BMs. Pt has generalized non-pitting edema (1/14). Skin assessment shows suspected DTI to the sacrum. Pt asleep in bed upon RD attempt at assessment. Daughter is at bedside and provides minimal hx. Daughter reports that pt typically has a small appetite and does not eat much pta. She is unsure is pt is on modified texture/consistency diet at NH, but was consuming regular texture/consistency foods prior to NH. Pt normally experiences constipation and this contributes to abdominal pain and poor po intake per daughter. NKFA/intolerances. No food preference r/t culture/[facility]. UBW is 115 lbs. Dosing wt is 117 lbs vs daily wt from 1/15 of 126.5 lbs. No wt loss of note, however pt is with edema. Pt does not appear to display any physical signs of muscle wasting/fat loss upon RD observation.

## 2020-01-15 NOTE — DIETITIAN INITIAL EVALUATION ADULT. - RD TO REMAIN AVAILABLE
yes/INTERVENTION: enteral nutrition, coordination of care. ME: RD to monitor diet order, energy intake, body composition, NFPF, glucose profile

## 2020-01-15 NOTE — PROGRESS NOTE ADULT - SUBJECTIVE AND OBJECTIVE BOX
JACKSON DAVENPORT 89y Female  MRN#: [id number]   CODE STATUS:________      SUBJECTIVE  Patient is a 89y old Female who presents with a chief complaint of Abdominal pain (14 Jan 2020 12:47)  Currently admitted to medicine with the primary diagnosis of Sepsis    Today is hospital day 5d, and this morning she is resting in bed, no acute events reported overnight. Patient's mental status is back to baseline. She refused SLP evaluation yester, SLP reconsulted, awaiting further recommendations. Patient is currently afebrile, hemodynamically stable. Denies any acute abdominal pain (s/p manual disimpaction yesterday, with two BM), admits to some relief post disimpaction. No CP, sob, or dysuria.     OBJECTIVE  PAST MEDICAL & SURGICAL HISTORY  Afib  History of cholecystectomy  H/O abdominal hysterectomy  Gastric ulcer  Hypothyroid  DM (diabetes mellitus) type II controlled peripheral vascular disorder  Hyperlipidemia  Hypertension  H/O shoulder surgery: left side    ALLERGIES:  ciprofloxacin (Unknown)  Flagyl (Hives)    MEDICATIONS:  STANDING MEDICATIONS  aspirin enteric coated 81 milliGRAM(s) Oral daily  dextrose 50% Injectable 12.5 Gram(s) IV Push once  dextrose 50% Injectable 25 Gram(s) IV Push once  dextrose 50% Injectable 25 Gram(s) IV Push once  diltiazem    Tablet 30 milliGRAM(s) Oral every 8 hours  heparin  Injectable 5000 Unit(s) SubCutaneous every 12 hours  influenza   Vaccine 0.5 milliLiter(s) IntraMuscular once  levothyroxine 50 MICROGram(s) Oral daily  LORazepam   Injectable 0.25 milliGRAM(s) IV Push every 6 hours  pantoprazole    Tablet 40 milliGRAM(s) Oral before breakfast    PRN MEDICATIONS  dextrose 40% Gel 15 Gram(s) Oral once PRN  glucagon  Injectable 1 milliGRAM(s) IntraMuscular once PRN  morphine  - Injectable 4 milliGRAM(s) IV Push every 6 hours PRN  morphine  - Injectable 2 milliGRAM(s) IV Push every 3 hours PRN      VITAL SIGNS: Last 24 Hours  T(C): 35.9 (15 Rogelio 2020 05:12), Max: 36.2 (14 Jan 2020 20:58)  T(F): 96.7 (15 Rogelio 2020 05:12), Max: 97.2 (14 Jan 2020 20:58)  HR: 75 (15 Rogelio 2020 05:12) (75 - 89)  BP: 163/69 (15 Rogelio 2020 05:12) (132/63 - 163/69)  BP(mean): --  RR: 18 (15 Rogelio 2020 05:12) (18 - 20)  SpO2: 99% (14 Jan 2020 20:19) (99% - 99%)    LABS:                        10.2   15.28 )-----------( 215      ( 15 Rogelio 2020 04:30 )             31.6     01-15    133<L>  |  102  |  32<H>  ----------------------------<  195<H>  4.5   |  19  |  1.0    Ca    7.9<L>      15 Rogelio 2020 04:30  Mg     2.0     01-15        RADIOLOGY:      PHYSICAL EXAM:    GENERAL: NAD, frail, AAOx2  HEENT:  Atraumatic, Normocephalic. EOMI, PERRLA, conjunctiva and sclera clear,  PULMONARY: Clear to auscultation bilaterally; No wheeze, no crackles  CARDIOVASCULAR: Regular rate and rhythm; No murmurs, rubs, or gallops  GASTROINTESTINAL: Soft, mild distension, + BS, No rigidity  : liang in place, no suprapubic tenderness  MUSCULOSKELETAL:  2+ Peripheral Pulses, no peripheral edema  NEUROLOGY: alert, awake, responsive to questions, follows one step commands, able to move extremities spontaneously  SKIN: Stage 3 sacral ulcer, clean, dry, intact

## 2020-01-15 NOTE — DIETITIAN INITIAL EVALUATION ADULT. - FACTORS AFF FOOD INTAKE
Per SLP on 1/15 "unable to participate in assessment secondary to poor mental status not accepting po by mouth; severe oral dysphagia secondary to poor cognitive status therefore at risk for aspiration and no other trials attempted at this time" recommend NPO with alternate means of nutrition/hydration/difficulty chewing/difficulty swallowing/difficulty feeding self/persistent lack of appetite

## 2020-01-15 NOTE — PROGRESS NOTE ADULT - ASSESSMENT
89 yo with PMH of hypertension, gastric ulcer, hypothyroidism, HLD, CHF, CKD, DMII, chronic systolic and diastolic heart failure, Afib with RVR presented from [facility] with complaint of acute abdominal/suprapubic pain onset two days ago associated with nausea.    Altered Mental Status secondary to Severe sepsis on admission due cystitis  - currently hemodynamically stable, lactate wnl  - WBC trending down, patient currently off antibiotics, no fevers or dysuria  - CT Abdomen 01/10: + fecal impaction with large stool burden, Urinary Tract Infection  - CXR: unremarkable  - s/p Vancomycin and meropenem  - bcx and ucx negative to date    Abdominal pain secondary to large stool impaction (resolving)  - leukocytosis might be additive secondary to stool stress  - Repeat KUB with large rectal stool impaction on 1/13, no perf  - s/p mineral enemas and manual disimpaction followed by two bowel movements    Hypoglycemia (resolved)  - patient with no PO intake for past three days  - s/p d10 drip, d/c'd in the am  - NPO for now  - SLP evaluation today (patient refused yesterday)  - f/s q4, will continue to monitor    OTTO on CKD 4 (resolved)  - baseline creatinine around 1.1, creatinine currently at baseline    Atrial Fibrillation w RVR not on anticoagulation  - continue diltiazem   - holding digoxin in light of OTTO    Chronic systolic and diastolic HF  - no cardiopulmonary disease  - c/w furosemide 20 mg PO daily  - strict Is Os  - daily weights  - monitor for volume overload    HTN (increased)  - currently NPO, will adjust after SLP evaluation    Code status: DNR/DNI  Diet: NPO, pending SLP  dispo: acute, from [facility], 89 yo with PMH of hypertension, gastric ulcer, hypothyroidism, HLD, CHF, CKD, DMII, chronic systolic and diastolic heart failure, Afib with RVR presented from [facility] with complaint of acute abdominal/suprapubic pain onset two days ago associated with nausea.    Altered Mental Status secondary to Severe sepsis on admission due cystitis  - currently hemodynamically stable, lactate wnl  - WBC trending down, patient currently off antibiotics, no fevers or dysuria  - CT Abdomen 01/10: + fecal impaction with large stool burden, Urinary Tract Infection  - CXR: unremarkable  - s/p Vancomycin and meropenem  - bcx and ucx negative to date    Abdominal pain secondary to large stool impaction (resolving)  - leukocytosis might be additive secondary to stool stress  - Repeat KUB with large rectal stool impaction on 1/13, no perf  - s/p mineral enemas and manual disimpaction followed by two bowel movements    Hypoglycemia (resolved)  - patient with no PO intake for past three days  - s/p d10 drip, d/c'd in the am  - NPO for now  - SLP evaluation today (patient refused yesterday)  - f/s q4, will continue to monitor    OTTO on CKD 4 (resolved)  - baseline creatinine around 1.1, creatinine currently at baseline    Atrial Fibrillation w RVR not on anticoagulation  - continue diltiazem and digoxin    Chronic systolic and diastolic HF  - no cardiopulmonary disease  - c/w furosemide 20 mg PO daily  - strict Is Os  - daily weights  - monitor for volume overload    HTN (increased)  - currently NPO, will adjust after SLP evaluation    Code status: DNR/DNI  Diet: NPO, pending SLP  dispo: acute, from [facility], 89 yo with PMH of hypertension, gastric ulcer, hypothyroidism, HLD, CHF, CKD, DMII, chronic systolic and diastolic heart failure, Afib with RVR presented from [facility] with complaint of acute abdominal/suprapubic pain onset two days ago associated with nausea.    Altered Mental Status secondary to severe sepsis on admission due cystitis  - currently hemodynamically stable, lactate wnl  - WBC trending down, patient currently off antibiotics, no fevers or dysuria  - CT Abdomen 01/10: + fecal impaction with large stool burden, Urinary Tract Infection  - CXR: unremarkable  - s/p Vancomycin and meropenem  - bcx and ucx negative to date    Abdominal pain secondary to large stool impaction (resolving)  - leukocytosis might be additive secondary to stool stress  - Repeat KUB with large rectal stool impaction on 1/13, no perf  - s/p mineral enemas and manual disimpaction followed by two bowel movements    DMII with Hypoglycemia (resolved)  - patient with no PO intake for past three days  - s/p d10 drip, d/c'd in the am  - NPO for now  - SLP evaluation today (patient refused yesterday)  - f/s q4, will continue to monitor    OTTO on CKD 3 (resolved)  - baseline creatinine around 1.1, creatinine currently at baseline    Atrial Fibrillation w RVR not on anticoagulation  - continue diltiazem and digoxin    Chronic systolic and diastolic HF  - no cardiopulmonary disease  - c/w furosemide 20 mg PO daily  - strict Is Os  - daily weights  - monitor for volume overload    HTN (increased)  - currently NPO, will adjust after SLP evaluation    Code status: DNR/DNI  Diet: NPO, pending SLP  dispo: acute, from [facility] 89 yo with PMH of hypertension, gastric ulcer, hypothyroidism, HLD, CHF, CKD, DMII, chronic systolic and diastolic heart failure, Afib with RVR presented from [facility] with complaint of acute abdominal/suprapubic pain onset two days ago associated with nausea.    Altered Mental Status secondary to severe sepsis on admission due cystitis  - currently hemodynamically stable, lactate wnl  - WBC trending down, patient currently off antibiotics, no fevers or dysuria  - CT Abdomen 01/10: + fecal impaction with large stool burden, Urinary Tract Infection  - CXR: unremarkable  - s/p Vancomycin and meropenem  - bcx and ucx negative to date    Abdominal pain secondary to large stool impaction (resolving)  - leukocytosis might be additive secondary to stool stress  - Repeat KUB with large rectal stool impaction on 1/13, no perf  - s/p mineral enemas and manual disimpaction followed by two bowel movements    DMII with Hypoglycemia (resolved)  - patient with no PO intake for past three days  - s/p d10 drip, d/c'd in the am  - NPO for now  - SLP evaluation today (patient refused yesterday)  - f/s q4, will continue to monitor    OTOT on CKD 3 (resolved)  AG metabolic acidsos 2/2 OTTO on CKD, DKA ruled out   - baseline creatinine around 1.1, creatinine currently at baseline    Atrial Fibrillation w RVR not on anticoagulation  - continue diltiazem and digoxin    Chronic systolic and diastolic HF  - no cardiopulmonary disease  - c/w furosemide 20 mg PO daily  - strict Is Os  - daily weights  - monitor for volume overload    HTN (increased)  - currently NPO, will adjust after SLP evaluation    Code status: DNR/DNI  Diet: NPO, pending SLP  dispo: acute, from [facility]

## 2020-01-16 LAB
ANION GAP SERPL CALC-SCNC: 18 MMOL/L — HIGH (ref 7–14)
ANION GAP SERPL CALC-SCNC: 18 MMOL/L — HIGH (ref 7–14)
BASOPHILS # BLD AUTO: 0.02 K/UL — SIGNIFICANT CHANGE UP (ref 0–0.2)
BASOPHILS # BLD AUTO: 0.03 K/UL — SIGNIFICANT CHANGE UP (ref 0–0.2)
BASOPHILS NFR BLD AUTO: 0.1 % — SIGNIFICANT CHANGE UP (ref 0–1)
BASOPHILS NFR BLD AUTO: 0.2 % — SIGNIFICANT CHANGE UP (ref 0–1)
BUN SERPL-MCNC: 29 MG/DL — HIGH (ref 10–20)
BUN SERPL-MCNC: 29 MG/DL — HIGH (ref 10–20)
CALCIUM SERPL-MCNC: 8.2 MG/DL — LOW (ref 8.5–10.1)
CALCIUM SERPL-MCNC: 8.2 MG/DL — LOW (ref 8.5–10.1)
CHLORIDE SERPL-SCNC: 100 MMOL/L — SIGNIFICANT CHANGE UP (ref 98–110)
CHLORIDE SERPL-SCNC: 102 MMOL/L — SIGNIFICANT CHANGE UP (ref 98–110)
CO2 SERPL-SCNC: 16 MMOL/L — LOW (ref 17–32)
CO2 SERPL-SCNC: 16 MMOL/L — LOW (ref 17–32)
CREAT SERPL-MCNC: 0.8 MG/DL — SIGNIFICANT CHANGE UP (ref 0.7–1.5)
CREAT SERPL-MCNC: 0.9 MG/DL — SIGNIFICANT CHANGE UP (ref 0.7–1.5)
EOSINOPHIL # BLD AUTO: 0.02 K/UL — SIGNIFICANT CHANGE UP (ref 0–0.7)
EOSINOPHIL # BLD AUTO: 0.03 K/UL — SIGNIFICANT CHANGE UP (ref 0–0.7)
EOSINOPHIL NFR BLD AUTO: 0.1 % — SIGNIFICANT CHANGE UP (ref 0–8)
EOSINOPHIL NFR BLD AUTO: 0.2 % — SIGNIFICANT CHANGE UP (ref 0–8)
GLUCOSE BLDC GLUCOMTR-MCNC: 183 MG/DL — HIGH (ref 70–99)
GLUCOSE BLDC GLUCOMTR-MCNC: 200 MG/DL — HIGH (ref 70–99)
GLUCOSE BLDC GLUCOMTR-MCNC: 203 MG/DL — HIGH (ref 70–99)
GLUCOSE BLDC GLUCOMTR-MCNC: 290 MG/DL — HIGH (ref 70–99)
GLUCOSE BLDC GLUCOMTR-MCNC: 310 MG/DL — HIGH (ref 70–99)
GLUCOSE BLDC GLUCOMTR-MCNC: 324 MG/DL — HIGH (ref 70–99)
GLUCOSE SERPL-MCNC: 183 MG/DL — HIGH (ref 70–99)
GLUCOSE SERPL-MCNC: 319 MG/DL — HIGH (ref 70–99)
HCT VFR BLD CALC: 30.5 % — LOW (ref 37–47)
HCT VFR BLD CALC: 31 % — LOW (ref 37–47)
HGB BLD-MCNC: 10 G/DL — LOW (ref 12–16)
HGB BLD-MCNC: 9.7 G/DL — LOW (ref 12–16)
IMM GRANULOCYTES NFR BLD AUTO: 1 % — HIGH (ref 0.1–0.3)
IMM GRANULOCYTES NFR BLD AUTO: 1.1 % — HIGH (ref 0.1–0.3)
LACTATE SERPL-SCNC: 1.8 MMOL/L — SIGNIFICANT CHANGE UP (ref 0.7–2)
LYMPHOCYTES # BLD AUTO: 1.36 K/UL — SIGNIFICANT CHANGE UP (ref 1.2–3.4)
LYMPHOCYTES # BLD AUTO: 1.37 K/UL — SIGNIFICANT CHANGE UP (ref 1.2–3.4)
LYMPHOCYTES # BLD AUTO: 7.3 % — LOW (ref 20.5–51.1)
LYMPHOCYTES # BLD AUTO: 8.6 % — LOW (ref 20.5–51.1)
MAGNESIUM SERPL-MCNC: 2.2 MG/DL — SIGNIFICANT CHANGE UP (ref 1.8–2.4)
MCHC RBC-ENTMCNC: 29.6 PG — SIGNIFICANT CHANGE UP (ref 27–31)
MCHC RBC-ENTMCNC: 29.6 PG — SIGNIFICANT CHANGE UP (ref 27–31)
MCHC RBC-ENTMCNC: 31.8 G/DL — LOW (ref 32–37)
MCHC RBC-ENTMCNC: 32.3 G/DL — SIGNIFICANT CHANGE UP (ref 32–37)
MCV RBC AUTO: 91.7 FL — SIGNIFICANT CHANGE UP (ref 81–99)
MCV RBC AUTO: 93 FL — SIGNIFICANT CHANGE UP (ref 81–99)
MONOCYTES # BLD AUTO: 1.26 K/UL — HIGH (ref 0.1–0.6)
MONOCYTES # BLD AUTO: 1.56 K/UL — HIGH (ref 0.1–0.6)
MONOCYTES NFR BLD AUTO: 7.9 % — SIGNIFICANT CHANGE UP (ref 1.7–9.3)
MONOCYTES NFR BLD AUTO: 8.4 % — SIGNIFICANT CHANGE UP (ref 1.7–9.3)
NEUTROPHILS # BLD AUTO: 13.07 K/UL — HIGH (ref 1.4–6.5)
NEUTROPHILS # BLD AUTO: 15.5 K/UL — HIGH (ref 1.4–6.5)
NEUTROPHILS NFR BLD AUTO: 82.2 % — HIGH (ref 42.2–75.2)
NEUTROPHILS NFR BLD AUTO: 82.9 % — HIGH (ref 42.2–75.2)
NRBC # BLD: 0 /100 WBCS — SIGNIFICANT CHANGE UP (ref 0–0)
NRBC # BLD: 0 /100 WBCS — SIGNIFICANT CHANGE UP (ref 0–0)
PLATELET # BLD AUTO: 243 K/UL — SIGNIFICANT CHANGE UP (ref 130–400)
PLATELET # BLD AUTO: 261 K/UL — SIGNIFICANT CHANGE UP (ref 130–400)
POTASSIUM SERPL-MCNC: 4.6 MMOL/L — SIGNIFICANT CHANGE UP (ref 3.5–5)
POTASSIUM SERPL-MCNC: 4.6 MMOL/L — SIGNIFICANT CHANGE UP (ref 3.5–5)
POTASSIUM SERPL-SCNC: 4.6 MMOL/L — SIGNIFICANT CHANGE UP (ref 3.5–5)
POTASSIUM SERPL-SCNC: 4.6 MMOL/L — SIGNIFICANT CHANGE UP (ref 3.5–5)
RBC # BLD: 3.28 M/UL — LOW (ref 4.2–5.4)
RBC # BLD: 3.38 M/UL — LOW (ref 4.2–5.4)
RBC # FLD: 16 % — HIGH (ref 11.5–14.5)
RBC # FLD: 16.4 % — HIGH (ref 11.5–14.5)
SODIUM SERPL-SCNC: 134 MMOL/L — LOW (ref 135–146)
SODIUM SERPL-SCNC: 136 MMOL/L — SIGNIFICANT CHANGE UP (ref 135–146)
WBC # BLD: 15.91 K/UL — HIGH (ref 4.8–10.8)
WBC # BLD: 18.67 K/UL — HIGH (ref 4.8–10.8)
WBC # FLD AUTO: 15.91 K/UL — HIGH (ref 4.8–10.8)
WBC # FLD AUTO: 18.67 K/UL — HIGH (ref 4.8–10.8)

## 2020-01-16 PROCEDURE — 74018 RADEX ABDOMEN 1 VIEW: CPT | Mod: 26

## 2020-01-16 PROCEDURE — 71045 X-RAY EXAM CHEST 1 VIEW: CPT | Mod: 26

## 2020-01-16 PROCEDURE — 99232 SBSQ HOSP IP/OBS MODERATE 35: CPT

## 2020-01-16 RX ORDER — MINERAL OIL
133 OIL (ML) MISCELLANEOUS ONCE
Refills: 0 | Status: COMPLETED | OUTPATIENT
Start: 2020-01-16 | End: 2020-01-16

## 2020-01-16 RX ORDER — INSULIN LISPRO 100/ML
6 VIAL (ML) SUBCUTANEOUS ONCE
Refills: 0 | Status: COMPLETED | OUTPATIENT
Start: 2020-01-16 | End: 2020-01-16

## 2020-01-16 RX ORDER — HALOPERIDOL DECANOATE 100 MG/ML
2 INJECTION INTRAMUSCULAR ONCE
Refills: 0 | Status: COMPLETED | OUTPATIENT
Start: 2020-01-16 | End: 2020-01-16

## 2020-01-16 RX ADMIN — HEPARIN SODIUM 5000 UNIT(S): 5000 INJECTION INTRAVENOUS; SUBCUTANEOUS at 05:58

## 2020-01-16 RX ADMIN — HALOPERIDOL DECANOATE 2 MILLIGRAM(S): 100 INJECTION INTRAMUSCULAR at 03:17

## 2020-01-16 RX ADMIN — Medication 81 MILLIGRAM(S): at 14:32

## 2020-01-16 RX ADMIN — Medication 10 MILLIGRAM(S): at 19:15

## 2020-01-16 RX ADMIN — Medication 6 UNIT(S): at 21:49

## 2020-01-16 RX ADMIN — Medication 30 MILLIGRAM(S): at 14:32

## 2020-01-16 RX ADMIN — Medication 30 MILLIGRAM(S): at 21:28

## 2020-01-16 RX ADMIN — HEPARIN SODIUM 5000 UNIT(S): 5000 INJECTION INTRAVENOUS; SUBCUTANEOUS at 19:15

## 2020-01-16 RX ADMIN — Medication 133 MILLILITER(S): at 21:28

## 2020-01-16 NOTE — PROGRESS NOTE ADULT - ASSESSMENT
89 yo with PMH of hypertension, gastric ulcer, hypothyroidism, HLD, CHF, CKD, DMII, chronic systolic and diastolic heart failure, Afib with RVR presented from [facility] with complaint of acute abdominal/suprapubic pain onset two days ago associated with nausea.    Altered Mental Status secondary to severe sepsis on admission due cystitis  - Mental status improved from baseline, agitated overnight, (haldol x2)  - currently hemodynamically stable, wbc trending up  - no fevers or dysuria  - Will keep patient off any sedative medication  - UA, ucx negative, d/c Cheema    Abdominal pain secondary to large stool impaction (resolving)  - leukocytosis might be additive secondary to stool stress  - Repeat KUB with large rectal stool impaction on 1/13, no perf  - s/p mineral enemas and manual disimpaction followed by two bowel movements  - repeat KUB today    DMII with Hypoglycemia (resolved)  - patient with no PO intake for past three days  - s/p d10 drip, d/c'd in the am  - NPO for now  - SLP evaluation today if patient mental status improves   - f/s q4, will continue to monitor    OTTO on CKD 3 (resolved)  AG metabolic acidoses 2/2 OTTO on CKD, DKA ruled out   - baseline creatinine around 1.1, creatinine currently at baseline    Atrial Fibrillation w RVR not on anticoagulation  - continue diltiazem and digoxin  - repeat EKG if patient is tachycardic    Chronic systolic and diastolic HF  - no cardiopulmonary disease  - c/w furosemide 20 mg PO daily  - strict Is Os  - daily weights  - monitor for volume overload    HTN (increased)  - currently NPO, will adjust after SLP evaluation    Code status: DNR/DNI  Diet: NPO, pending SLP  dispo: acute, from [facility] 89 yo with PMH of hypertension, gastric ulcer, hypothyroidism, HLD, CHF, CKD, DMII, chronic systolic and diastolic heart failure, Afib with RVR presented from [facility] with complaint of acute abdominal/suprapubic pain onset two days ago associated with nausea.    Altered Mental Status secondary to severe sepsis on admission due cystitis  - Mental status improved from baseline, agitated overnight, (haldol x2)  - currently hemodynamically stable, wbc trending up  - no fevers or dysuria  - Will keep patient off any sedative medication  - UA, ucx negative, d/c Cheema    Abdominal pain secondary to large stool impaction (resolving)  - leukocytosis might be additive secondary to stool stress  - Repeat KUB with large rectal stool impaction on 1/13, no perf  - s/p mineral enemas and manual disimpaction followed by two bowel movements  - repeat KUB today    DMII with Hypoglycemia (resolved)  - patient with no PO intake for past three days  - s/p d10 drip, d/c'd in the am  - NPO for now  - SLP evaluation today if patient mental status improves   - f/s q4, will continue to monitor    OTTO on CKD 3 (resolved)  AG metabolic acidoses 2/2 OTTO on CKD, DKA ruled out   - baseline creatinine around 1.1, creatinine currently at baseline    Atrial Fibrillation w RVR not on anticoagulation  - continue diltiazem and digoxin  - repeat EKG if patient is tachycardic    Chronic systolic and diastolic HF  - no cardiopulmonary disease  - c/w furosemide 20 mg PO daily  - strict Is Os  - daily weights  - monitor for volume overload    HTN (increased)  - currently NPO, will adjust after SLP evaluation    Code status: DNR/DNI  Diet: dysphagia level 1 puree diet  dispo: acute, from [facility] 89 yo with PMH of hypertension, gastric ulcer, hypothyroidism, HLD, CHF, CKD, DMII, chronic systolic and diastolic heart failure, Afib with RVR presented from [facility] with complaint of acute abdominal/suprapubic pain onset two days ago associated with nausea.    Altered Mental Status secondary to severe sepsis on admission due cystitis  - Mental status improved from baseline, agitated overnight, (haldol x2)  - currently hemodynamically stable, wbc trending up  - no fevers or dysuria  - Will keep patient off any sedative medication  - UA, ucx negative, d/c Cheema    Abdominal pain secondary to large stool impaction   - leukocytosis might be additive secondary to stool stress  - Repeat KUB with large rectal stool impaction on 1/13, no perf  - s/p mineral enemas and manual disimpaction followed by two bowel movements  - repeat KUB today    DMII with Hypoglycemia (resolved)  - patient with no PO intake for past three days  - s/p d10 drip, d/c'd in the am  - NPO for now  - SLP evaluation today if patient mental status improves   - f/s q4, will continue to monitor    OTTO on CKD 3 (resolved)  AG metabolic acidoses 2/2 OTTO on CKD, DKA ruled out   - baseline creatinine around 1.1, creatinine currently at baseline    Atrial Fibrillation w RVR not on anticoagulation  - continue diltiazem and digoxin  - repeat EKG if patient is tachycardic    Chronic systolic and diastolic HF  - no cardiopulmonary disease  - c/w furosemide 20 mg PO daily  - strict Is Os  - daily weights  - monitor for volume overload    HTN (increased)  - currently NPO, will adjust after SLP evaluation    Code status: DNR/DNI  Diet: dysphagia level 1 puree diet  dispo: acute, from [facility]

## 2020-01-16 NOTE — SWALLOW BEDSIDE ASSESSMENT ADULT - NS SPL SWALLOW CLINIC TRIAL FT
severe oral dysphagia secondary to poor cognitive status therefore at risk for aspiration no other trials attempted at this time
severe oral dysphagia secondary to poor cognitive status therefore at risk for aspiration and no other trials attempted at this time
+toleration of puree and thin liquids w/o any overt s/s of penetration/aspiration.

## 2020-01-16 NOTE — SWALLOW BEDSIDE ASSESSMENT ADULT - SLP GENERAL OBSERVATIONS
arousable with verbal/tactile stimuli verbalized first name only and illogical other verbalizations
arousable with verbal/tactile stimuli illogical verbalizations with unknown content, daughter at bedside
Pt received in bed awake and alert on O2 NC.

## 2020-01-16 NOTE — PROGRESS NOTE ADULT - ATTENDING COMMENTS
Patient has clinically improved. She was able to tolerate a pureed diet with thin liquids. She is in afib with RVR since PO meds were being held. Lab work significant for worsening leukocytosis and AGMA possibly from stercoral proctitis. Renal function has improved.       #Progress Note Handoff  Pending (specify):  repeat labs, check lactate, manual disimpaction today, repeat KUB tomorrow   Family discussion: dw daughter plan for possible discharge tomorrow   Disposition: SNF

## 2020-01-16 NOTE — PROGRESS NOTE ADULT - SUBJECTIVE AND OBJECTIVE BOX
JACKSON DAVENPORT 89y Female  MRN#: [id number]   CODE STATUS:________      SUBJECTIVE  Patient is a 89y old Female who presents with a chief complaint of Abdominal pain (15 Rogelio 2020 10:09)  Currently admitted to medicine with the primary diagnosis of Sepsis    Today is hospital day 6d, and this morning she is resting in bed, per RN, patient was restless and agitated overnight, banding on the bed rails and required 2 doses of haldol to calm down. Patient was unable to be redirected. This am, patient is awake, responsive to questioning and remains drowsy, likely from the Haldol. She denies any acute abdominal pain currently, afebrile, was tachycardic overnightmll assess HR during day time as patient was agitated. Will assess mental status later in day for possible SLP evaluation as patient has taken own NGT previously for oral feeding.           OBJECTIVE  PAST MEDICAL & SURGICAL HISTORY  Afib  History of cholecystectomy  H/O abdominal hysterectomy  Gastric ulcer  Hypothyroid  DM (diabetes mellitus) type II controlled peripheral vascular disorder  Hyperlipidemia  Hypertension  H/O shoulder surgery: left side    ALLERGIES:  ciprofloxacin (Unknown)  Flagyl (Hives)    MEDICATIONS:  STANDING MEDICATIONS  aspirin enteric coated 81 milliGRAM(s) Oral daily  dextrose 50% Injectable 12.5 Gram(s) IV Push once  dextrose 50% Injectable 25 Gram(s) IV Push once  dextrose 50% Injectable 25 Gram(s) IV Push once  digoxin     Tablet 0.125 milliGRAM(s) Oral daily  diltiazem    Tablet 30 milliGRAM(s) Oral every 8 hours  heparin  Injectable 5000 Unit(s) SubCutaneous every 12 hours  influenza   Vaccine 0.5 milliLiter(s) IntraMuscular once  levothyroxine 50 MICROGram(s) Oral daily  pantoprazole    Tablet 40 milliGRAM(s) Oral before breakfast    PRN MEDICATIONS  dextrose 40% Gel 15 Gram(s) Oral once PRN  glucagon  Injectable 1 milliGRAM(s) IntraMuscular once PRN      VITAL SIGNS: Last 24 Hours  T(C): 35.5 (16 Jan 2020 04:48), Max: 36.4 (15 Rogelio 2020 21:53)  T(F): 95.9 (16 Jan 2020 04:48), Max: 97.5 (15 Rogelio 2020 21:53)  HR: 92 (16 Jan 2020 05:56) (75 - 131)  BP: 133/76 (16 Jan 2020 04:48) (133/76 - 143/62)  BP(mean): --  RR: 18 (16 Jan 2020 04:48) (18 - 20)  SpO2: 97% (15 Rogelio 2020 20:30) (97% - 97%)    LABS:                        9.7    18.67 )-----------( 243      ( 16 Jan 2020 07:20 )             30.5     01-16    136  |  102  |  29<H>  ----------------------------<  183<H>  4.6   |  16<L>  |  0.8    Ca    8.2<L>      16 Jan 2020 07:20  Mg     2.2     01-16        RADIOLOGY:      PHYSICAL EXAM:    GENERAL: NAD, frail,AAOx2  HEENT:  Atraumatic, Normocephalic. AYSHA  PULMONARY: Clear to auscultation bilaterally; No wheeze  CARDIOVASCULAR: Regular rate and rhythm; No murmurs, rubs, or gallops  GASTROINTESTINAL: Soft, Nontender, Nondistended; Bowel sounds present  : no suprapubic pain, liang in place  MUSCULOSKELETAL:  2+ Peripheral Pulses, + UE b/l peripheral edema  NEUROLOGY: awake, responsive to questions, drowsy, able to move upper extremities spontaneously  SKIN: stage 2 sacral ulcer

## 2020-01-16 NOTE — SWALLOW BEDSIDE ASSESSMENT ADULT - ORAL PREPARATORY PHASE
Reduced oral grading/Refuses to accept bolus into oral cavity/Anterior loss of bolus/anterior spillage of bolus
no stripping of spoon/Reduced oral grading/Anterior loss of bolus/Refuses to accept bolus into oral cavity
Within functional limits

## 2020-01-16 NOTE — SWALLOW BEDSIDE ASSESSMENT ADULT - SWALLOW EVAL: DIAGNOSIS
+improved po accpetance, reportedly ate po for RN today. +toleration of thin and puree w/o overt s/s of penetration/aspiration

## 2020-01-16 NOTE — SWALLOW BEDSIDE ASSESSMENT ADULT - NS ASR SWALLOW FINDINGS DISCUS
Team aware of SLP assessment and findings with no improvement in patient status since yesterday and long term nutrition/hydration plan necessary at this time/Physician/Family/Nursing
Nursing/Physician/Patient

## 2020-01-17 ENCOUNTER — TRANSCRIPTION ENCOUNTER (OUTPATIENT)
Age: 85
End: 2020-01-17

## 2020-01-17 VITALS
SYSTOLIC BLOOD PRESSURE: 186 MMHG | RESPIRATION RATE: 18 BRPM | HEART RATE: 73 BPM | DIASTOLIC BLOOD PRESSURE: 81 MMHG | TEMPERATURE: 96 F

## 2020-01-17 LAB
ANION GAP SERPL CALC-SCNC: 14 MMOL/L — SIGNIFICANT CHANGE UP (ref 7–14)
BASOPHILS # BLD AUTO: 0.03 K/UL — SIGNIFICANT CHANGE UP (ref 0–0.2)
BASOPHILS NFR BLD AUTO: 0.2 % — SIGNIFICANT CHANGE UP (ref 0–1)
BUN SERPL-MCNC: 24 MG/DL — HIGH (ref 10–20)
CALCIUM SERPL-MCNC: 8.6 MG/DL — SIGNIFICANT CHANGE UP (ref 8.5–10.1)
CHLORIDE SERPL-SCNC: 105 MMOL/L — SIGNIFICANT CHANGE UP (ref 98–110)
CO2 SERPL-SCNC: 19 MMOL/L — SIGNIFICANT CHANGE UP (ref 17–32)
CREAT SERPL-MCNC: 0.8 MG/DL — SIGNIFICANT CHANGE UP (ref 0.7–1.5)
EOSINOPHIL # BLD AUTO: 0.1 K/UL — SIGNIFICANT CHANGE UP (ref 0–0.7)
EOSINOPHIL NFR BLD AUTO: 0.6 % — SIGNIFICANT CHANGE UP (ref 0–8)
GLUCOSE BLDC GLUCOMTR-MCNC: 184 MG/DL — HIGH (ref 70–99)
GLUCOSE BLDC GLUCOMTR-MCNC: 207 MG/DL — HIGH (ref 70–99)
GLUCOSE BLDC GLUCOMTR-MCNC: 222 MG/DL — HIGH (ref 70–99)
GLUCOSE BLDC GLUCOMTR-MCNC: 275 MG/DL — HIGH (ref 70–99)
GLUCOSE SERPL-MCNC: 210 MG/DL — HIGH (ref 70–99)
HCT VFR BLD CALC: 30.9 % — LOW (ref 37–47)
HGB BLD-MCNC: 9.9 G/DL — LOW (ref 12–16)
IMM GRANULOCYTES NFR BLD AUTO: 1.1 % — HIGH (ref 0.1–0.3)
LACTATE SERPL-SCNC: 1.6 MMOL/L — SIGNIFICANT CHANGE UP (ref 0.7–2)
LYMPHOCYTES # BLD AUTO: 1.51 K/UL — SIGNIFICANT CHANGE UP (ref 1.2–3.4)
LYMPHOCYTES # BLD AUTO: 9.4 % — LOW (ref 20.5–51.1)
MAGNESIUM SERPL-MCNC: 2.1 MG/DL — SIGNIFICANT CHANGE UP (ref 1.8–2.4)
MCHC RBC-ENTMCNC: 29.3 PG — SIGNIFICANT CHANGE UP (ref 27–31)
MCHC RBC-ENTMCNC: 32 G/DL — SIGNIFICANT CHANGE UP (ref 32–37)
MCV RBC AUTO: 91.4 FL — SIGNIFICANT CHANGE UP (ref 81–99)
MONOCYTES # BLD AUTO: 1.3 K/UL — HIGH (ref 0.1–0.6)
MONOCYTES NFR BLD AUTO: 8.1 % — SIGNIFICANT CHANGE UP (ref 1.7–9.3)
NEUTROPHILS # BLD AUTO: 12.94 K/UL — HIGH (ref 1.4–6.5)
NEUTROPHILS NFR BLD AUTO: 80.6 % — HIGH (ref 42.2–75.2)
NRBC # BLD: 0 /100 WBCS — SIGNIFICANT CHANGE UP (ref 0–0)
PLATELET # BLD AUTO: 299 K/UL — SIGNIFICANT CHANGE UP (ref 130–400)
POTASSIUM SERPL-MCNC: 4.4 MMOL/L — SIGNIFICANT CHANGE UP (ref 3.5–5)
POTASSIUM SERPL-SCNC: 4.4 MMOL/L — SIGNIFICANT CHANGE UP (ref 3.5–5)
RBC # BLD: 3.38 M/UL — LOW (ref 4.2–5.4)
RBC # FLD: 16.4 % — HIGH (ref 11.5–14.5)
SODIUM SERPL-SCNC: 138 MMOL/L — SIGNIFICANT CHANGE UP (ref 135–146)
WBC # BLD: 16.06 K/UL — HIGH (ref 4.8–10.8)
WBC # FLD AUTO: 16.06 K/UL — HIGH (ref 4.8–10.8)

## 2020-01-17 PROCEDURE — 99239 HOSP IP/OBS DSCHRG MGMT >30: CPT

## 2020-01-17 RX ADMIN — Medication 81 MILLIGRAM(S): at 11:39

## 2020-01-17 RX ADMIN — Medication 50 MICROGRAM(S): at 05:18

## 2020-01-17 RX ADMIN — Medication 30 MILLIGRAM(S): at 05:18

## 2020-01-17 RX ADMIN — HEPARIN SODIUM 5000 UNIT(S): 5000 INJECTION INTRAVENOUS; SUBCUTANEOUS at 05:18

## 2020-01-17 RX ADMIN — Medication 0.12 MILLIGRAM(S): at 05:18

## 2020-01-17 RX ADMIN — Medication 30 MILLIGRAM(S): at 11:40

## 2020-01-17 NOTE — DISCHARGE NOTE PROVIDER - NSDCCPCAREPLAN_GEN_ALL_CORE_FT
PRINCIPAL DISCHARGE DIAGNOSIS  Diagnosis: Sepsis  Assessment and Plan of Treatment: Your high temperature and change in mental status was likely due to the urinary tract infection that you had requiring antibiotics. You also had very large amount of stool in your bowels that might of added to the elevated white blood cell coutns. After antibiotics, your mental status improved along with your white blood cell count. Please continue to stay hydrated. Return to the ED if you expereince elevated HR, low blood pressure, dizziness, fevers.      SECONDARY DISCHARGE DIAGNOSES  Diagnosis: Impacted stool in rectum  Assessment and Plan of Treatment: Your severe abdominal pain was likely due to a large amount of stool that you had impacted in your rectum. It has caused stress on your bowels and caused an inflammatory response. We manually disimpacted you twice, softened your stool, and increased your bowel movements, with enemas and you have multiple bowel movements that helped reduce your stool burden. Please continue to use stool softeners as necessary and avoid any medication (pain medication) that may cause you to have constipation.    Diagnosis: OTTO (acute kidney injury)  Assessment and Plan of Treatment: Your acute kidney injury was likely due to the sepsis and dehydration you had prior to coming into the hospital. Your kidney function improved with hydration and resolution of sepsis.

## 2020-01-17 NOTE — DISCHARGE NOTE PROVIDER - CARE PROVIDER_API CALL
MAP, Clinic  [address] [place] [id number]  Phone: [phone]  Fax: (   )    -  Follow Up Time:

## 2020-01-17 NOTE — DISCHARGE NOTE PROVIDER - PROVIDER TOKENS
FREE:[LAST:[MAP],FIRST:[Clinic],PHONE:[phone]],FAX:[phone]   [phone]    -],ADDRESS:[[address] [place], [place] [id number]]]

## 2020-01-17 NOTE — DISCHARGE NOTE PROVIDER - HOSPITAL COURSE
89 yo with PMH of hypertension, gastric ulcer, hypothyroidism, HLD, CHF, CKD, DMII, chronic systolic and diastolic heart failure, Afib with RVR, presents to the ED from nursing home with chief complaint of acute, worsening abdominal pain for the past two days associated with nausea, suprapubic pain, with altered mental status from baseline. Prior to hospitalization, patient finished course of microbid for recently diagnosed UTI. Patient was with leukocytosis with fever, CT Abdomen with very large stool burden and cystitis with possible colitis. Patient finished course of broad spectrum antibiotics. Urine and blood cultures negative. Patient's leukocytosis was likely due to resolving UTI and increased stress from stool burden. Patient was manually disimpacted x2 with aggressive bowel regimen with resolution of her mental status, abdominal pain. and leukocytosis downtrending. 89 yo with PMH of hypertension, gastric ulcer, hypothyroidism, HLD, CHF, CKD, DMII, chronic systolic and diastolic heart failure, Afib with RVR, presents to the ED from nursing home with chief complaint of acute, worsening abdominal pain for the past two days associated with nausea, suprapubic pain, with altered mental status from baseline. Prior to hospitalization, patient finished course of microbid for recently diagnosed UTI. Patient was with leukocytosis with fever, CT Abdomen with very large stool burden and cystitis with possible colitis. Patient finished course of broad spectrum antibiotics. Urine and blood cultures negative. Patient's leukocytosis was likely due to resolving UTI and increased stress from stool burden. Patient was manually disimpacted x2 with aggressive bowel regimen with resolution of her mental status, abdominal pain. and leukocytosis downtrending.         Attending Addendum: Elderly patient with advance dementia presented with constipation complicated by fecal impaction. Patient had multiple manual disimpactions and on 1/16 started to clear her bowels. Her lab work improved (leukocytosis trended down, AG improved) and she improved clinically. Her mental status improved and she was tolerated diet without any abd pain. She is stable for discharge.

## 2020-01-17 NOTE — DISCHARGE NOTE NURSING/CASE MANAGEMENT/SOCIAL WORK - PATIENT PORTAL LINK FT
You can access the FollowMyHealth Patient Portal offered by [facility] by registering at the following website: [email or web address]. By joining [employer]’s FollowMyHealth portal, you will also be able to view your health information using other applications (apps) compatible with our system.

## 2020-01-22 DIAGNOSIS — I48.91 UNSPECIFIED ATRIAL FIBRILLATION: ICD-10-CM

## 2020-01-22 DIAGNOSIS — E11.649 TYPE 2 DIABETES MELLITUS WITH HYPOGLYCEMIA WITHOUT COMA: ICD-10-CM

## 2020-01-22 DIAGNOSIS — A41.9 SEPSIS, UNSPECIFIED ORGANISM: ICD-10-CM

## 2020-01-22 DIAGNOSIS — N17.9 ACUTE KIDNEY FAILURE, UNSPECIFIED: ICD-10-CM

## 2020-01-22 DIAGNOSIS — K57.32 DIVERTICULITIS OF LARGE INTESTINE WITHOUT PERFORATION OR ABSCESS WITHOUT BLEEDING: ICD-10-CM

## 2020-01-22 DIAGNOSIS — Z88.8 ALLERGY STATUS TO OTHER DRUGS, MEDICAMENTS AND BIOLOGICAL SUBSTANCES STATUS: ICD-10-CM

## 2020-01-22 DIAGNOSIS — I13.0 HYPERTENSIVE HEART AND CHRONIC KIDNEY DISEASE WITH HEART FAILURE AND STAGE 1 THROUGH STAGE 4 CHRONIC KIDNEY DISEASE, OR UNSPECIFIED CHRONIC KIDNEY DISEASE: ICD-10-CM

## 2020-01-22 DIAGNOSIS — G93.41 METABOLIC ENCEPHALOPATHY: ICD-10-CM

## 2020-01-22 DIAGNOSIS — N13.6 PYONEPHROSIS: ICD-10-CM

## 2020-01-22 DIAGNOSIS — E11.10 TYPE 2 DIABETES MELLITUS WITH KETOACIDOSIS WITHOUT COMA: ICD-10-CM

## 2020-01-22 DIAGNOSIS — N18.4 CHRONIC KIDNEY DISEASE, STAGE 4 (SEVERE): ICD-10-CM

## 2020-01-22 DIAGNOSIS — R65.20 SEVERE SEPSIS WITHOUT SEPTIC SHOCK: ICD-10-CM

## 2020-01-22 DIAGNOSIS — E11.65 TYPE 2 DIABETES MELLITUS WITH HYPERGLYCEMIA: ICD-10-CM

## 2020-01-22 DIAGNOSIS — K56.41 FECAL IMPACTION: ICD-10-CM

## 2020-01-22 DIAGNOSIS — E03.9 HYPOTHYROIDISM, UNSPECIFIED: ICD-10-CM

## 2020-01-22 DIAGNOSIS — K52.9 NONINFECTIVE GASTROENTERITIS AND COLITIS, UNSPECIFIED: ICD-10-CM

## 2020-01-22 DIAGNOSIS — Z66 DO NOT RESUSCITATE: ICD-10-CM

## 2020-01-22 DIAGNOSIS — D64.9 ANEMIA, UNSPECIFIED: ICD-10-CM

## 2020-01-22 DIAGNOSIS — E87.1 HYPO-OSMOLALITY AND HYPONATREMIA: ICD-10-CM

## 2020-01-22 DIAGNOSIS — E78.5 HYPERLIPIDEMIA, UNSPECIFIED: ICD-10-CM

## 2020-01-22 DIAGNOSIS — I50.42 CHRONIC COMBINED SYSTOLIC (CONGESTIVE) AND DIASTOLIC (CONGESTIVE) HEART FAILURE: ICD-10-CM

## 2020-01-22 DIAGNOSIS — E11.22 TYPE 2 DIABETES MELLITUS WITH DIABETIC CHRONIC KIDNEY DISEASE: ICD-10-CM

## 2020-05-22 NOTE — ED CLERICAL - NS ED CLERK UNITS
PRE-SEDATION ASSESSMENT    CONSENT  Consent for procedure and sedation obtained: Yes    MEDICAL HISTORY       PHYSICAL EXAM  History and Physical Reviewed: H&P completed today  Airway Risk History: No previous history  Airway Anatomy : Class II  Heart : Normal  Lungs : Normal  LOC/Mental Status : Normal    OTHER FINDINGS  Reviewed current medications and allergies: Yes  Pertinent lab/diagnostic test reviewed: Yes    SEDATION RISK ASSESSMENT  Risk Status ASA: Class II - Normal patient with mild systemic disease  Plan for Sedation: Moderate Sedation  EKG Monitoring: Yes    NARRATIVE FINDINGS      RF [id number]

## 2020-09-19 NOTE — ED ADULT NURSE NOTE - BOWEL SOUNDS RUQ
ADULT VIDEOFLUOROSCOPIC SWALLOWING STUDY    Admission Date: 9/19/2020  Evaluation Date: 09/19/20  Radiologist: Dr. Yoel Woodard completed in conjunction with Radiologist to assess oropharyngeal swallow function and a tortuosity ascending and descending thoracic aorta. .  No cardiac silhouette abnormality or cardiomegaly. Unremarkable pulmonary vasculature. MEDIAST/MARISOL:  No visible mass or adenopathy.   LUNGS/PLEURA:  Normal.  No significant pulmonary parenchymal a hyperactive

## 2020-09-29 NOTE — PATIENT PROFILE ADULT - STAGE
stage I Rituxan Counseling:  I discussed with the patient the risks of Rituxan infusions. Side effects can include infusion reactions, severe drug rashes including mucocutaneous reactions, reactivation of latent hepatitis and other infections and rarely progressive multifocal leukoencephalopathy.  All of the patient's questions and concerns were addressed.

## 2020-10-21 NOTE — PHARMACOTHERAPY INTERVENTION NOTE - COMMENTS
d10% at 50ml/hr.  I s/w dr devine [id number] to clarify use. he said pt is continiousl hypoglglcemic and fluid restricted  so instead of d5% at 75ml/hr , he wants d10% at 35ml/hr. I asked him to monitor sugars closely None felt

## 2021-04-28 NOTE — ED PROVIDER NOTE - PROGRESS NOTE
Impression: Vitreous degeneration, bilateral: H43.813. Plan: For posterior vitreous detachment, I have explained signs and symptoms associated with retinal tear or detachment. Patient was advised to return ASAP if they notice an increase or change in floaters, change or decrease in vision or a veil or curtain coming down over their vision. I have explained the risk of vision loss, and possible need for retinal detachment repair in detail. Patient was also provided with an informational pamphlet. Stable.

## 2022-11-11 NOTE — ED ADULT TRIAGE NOTE - PAIN: PRESENCE, MLM
denies pain/discomfort Cephalexin Pregnancy And Lactation Text: This medication is Pregnancy Category B and considered safe during pregnancy.  It is also excreted in breast milk but can be used safely for shorter doses.

## 2023-05-25 NOTE — PHYSICAL THERAPY INITIAL EVALUATION ADULT - DIAGNOSIS, PT EVAL
[facility]SS placed call to patient to arrange RPM kit  through [id number]. Reviewed with patient how to pack equipment in original packing. N/A Could not LV d/t full VM    Verified patient's availability to schedule UPS  time. N/A Could not LV d/t full VM    UPS  time requested. Anticipated  date range 4-7 business days.
M.62.8 muscle weakness

## 2024-02-08 NOTE — DISCHARGE NOTE PROVIDER - NSDCCPCAREPLAN_GEN_ALL_CORE_FT
PRINCIPAL DISCHARGE DIAGNOSIS  Diagnosis: Atrial fibrillation with RVR  Assessment and Plan of Treatment: - seen by crdiology      SECONDARY DISCHARGE DIAGNOSES  Diagnosis: Acute on chronic diastolic heart failure  Assessment and Plan of Treatment:     Diagnosis: Acute on chronic systolic heart failure  Assessment and Plan of Treatment:     Diagnosis: Acute cystitis  Assessment and Plan of Treatment:
You can access the FollowMyHealth Patient Portal offered by [facility] by registering at the following website: [email or web address]. By joining [facility]’s FollowMyHealth portal, you will also be able to view your health information using other applications (apps) compatible with our system.

## 2024-05-24 NOTE — PATIENT PROFILE ADULT - BILL PAYMENT
Daughter reaching out (not on release of information) stating that her mother Shirin is in need of a letter stating that she is unable to make financial decisions regarding her  Gorge's recent death.     Please advise on next steps.  
I did   
no

## 2024-11-11 NOTE — CONSULT NOTE ADULT - CONSULT REQUESTED BY NAME
Mammogram and US received and reviewed - was awaiting read from radiologist as they were awaiting prior imagining.    Per radiologist - There is an asymmetry in the right breast that has not significantly changed however additional imaging is being requested please place order for diagnostic right breast US and diagnostic right breast mammogram   pcp

## 2025-02-12 NOTE — PHYSICAL THERAPY INITIAL EVALUATION ADULT - IMPAIRMENTS CONTRIBUTING IMPAIRED BED MOBILITY, REHAB EVAL
decreased ROM/impaired balance/decreased strength/cognition/decreased flexibility/pain 1 = Total assistance

## 2025-05-15 NOTE — DIETITIAN INITIAL EVALUATION ADULT. - NAME AND PHONE
Patient called requesting refill for atorvastatin (LIPITOR) 40 mg tablet . Patient made aware medication was refilled on 11.14.2024 for 90 with 3 refills to [facility] pharmacy. Patient instructed to contact the pharmacy and speak with someone directly to obtain refills of medication. Patient advised to call back for refill if their pharmacy is unable to assist them. Patient verbalized understanding.     Carli